# Patient Record
Sex: MALE | Race: WHITE | Employment: OTHER | URBAN - METROPOLITAN AREA
[De-identification: names, ages, dates, MRNs, and addresses within clinical notes are randomized per-mention and may not be internally consistent; named-entity substitution may affect disease eponyms.]

---

## 2017-06-28 ENCOUNTER — RADIANT APPOINTMENT (OUTPATIENT)
Dept: GENERAL RADIOLOGY | Facility: CLINIC | Age: 72
End: 2017-06-28
Attending: INTERNAL MEDICINE
Payer: COMMERCIAL

## 2017-06-28 ENCOUNTER — OFFICE VISIT (OUTPATIENT)
Dept: INTERNAL MEDICINE | Facility: CLINIC | Age: 72
End: 2017-06-28
Payer: COMMERCIAL

## 2017-06-28 VITALS
DIASTOLIC BLOOD PRESSURE: 82 MMHG | WEIGHT: 209.1 LBS | HEIGHT: 72 IN | BODY MASS INDEX: 28.32 KG/M2 | SYSTOLIC BLOOD PRESSURE: 136 MMHG | TEMPERATURE: 98.1 F | HEART RATE: 57 BPM | OXYGEN SATURATION: 98 %

## 2017-06-28 DIAGNOSIS — M25.551 HIP PAIN, RIGHT: ICD-10-CM

## 2017-06-28 DIAGNOSIS — M25.551 HIP PAIN, RIGHT: Primary | ICD-10-CM

## 2017-06-28 DIAGNOSIS — Z71.89 ADVANCED DIRECTIVES, COUNSELING/DISCUSSION: Chronic | ICD-10-CM

## 2017-06-28 PROCEDURE — 73502 X-RAY EXAM HIP UNI 2-3 VIEWS: CPT

## 2017-06-28 PROCEDURE — 99214 OFFICE O/P EST MOD 30 MIN: CPT | Performed by: INTERNAL MEDICINE

## 2017-06-28 NOTE — NURSING NOTE
"Chief Complaint   Patient presents with     Musculoskeletal Problem       Initial /82  Pulse 57  Temp 98.1  F (36.7  C) (Oral)  Ht 5' 11.5\" (1.816 m)  Wt 209 lb 1.6 oz (94.8 kg)  SpO2 98%  BMI 28.76 kg/m2 Estimated body mass index is 28.76 kg/(m^2) as calculated from the following:    Height as of this encounter: 5' 11.5\" (1.816 m).    Weight as of this encounter: 209 lb 1.6 oz (94.8 kg).  Medication Reconciliation: complete    "

## 2017-06-28 NOTE — MR AVS SNAPSHOT
After Visit Summary   6/28/2017    Guillermo Hurst    MRN: [id number]           Patient Information     Date Of Birth          1945        Visit Information        Provider Department      6/28/2017 9:00 AM Juliocesar Tuttle MD [facility]        Today's Diagnoses     Hip pain, right    -  1    Advanced directives, counseling/discussion           Follow-ups after your visit        Additional Services     SPORTS MEDICINE REFERRAL       Your provider has referred you to:  [facility]: [facility]  [phone]   [email or web address]    Please be aware that coverage of these services is subject to the terms and limitations of your health insurance plan.  Call member services at your health plan with any benefit or coverage questions.      Please bring the following to your appointment:    >>   Any x-rays, CTs or MRIs which have been performed.  Contact the facility where they were done to arrange for  prior to your scheduled appointment.    >>   List of current medications   >>   This referral request   >>   Any documents/labs given to you for this referral                  Future tests that were ordered for you today     Open Future Orders        Priority Expected Expires Ordered    XR Pelvis and Hip Right 2 Views Routine 6/28/2017 6/28/2018 6/28/2017            Who to contact     If you have questions or need follow up information about today's clinic visit or your schedule please contact [facility] directly at [phone].  Normal or non-critical lab and imaging results will be communicated to you by MyChart, letter or phone within 4 business days after the clinic has received the results. If you do not hear from us within 7 days, please contact the clinic through MyChart or phone. If you have a critical or abnormal lab  "result, we will notify you by phone as soon as possible.  Submit refill requests through [employer] or call your pharmacy and they will forward the refill request to us. Please allow 3 business days for your refill to be completed.          Additional Information About Your Visit        [employer] [facility] Information     [employer] gives you secure access to your electronic health record. If you see a primary care provider, you can also send messages to your care team and make appointments. If you have questions, please call your primary care clinic.  If you do not have a primary care provider, please call [phone] and they will assist you.        Care EveryWhere ID     This is your Care EveryWhere ID. This could be used by other organizations to access your [place] medical records  [id number]        Your Vitals Were     Pulse Temperature Height Pulse Oximetry BMI (Body Mass Index)       57 98.1  F (36.7  C) (Oral) 5' 11.5\" (1.816 m) 98% 28.76 kg/m2        Blood Pressure from Last 3 Encounters:   06/28/17 136/82   11/15/16 120/78   06/07/16 118/64    Weight from Last 3 Encounters:   06/28/17 209 lb 1.6 oz (94.8 kg)   11/15/16 209 lb 11.2 oz (95.1 kg)   06/07/16 209 lb 3.2 oz (94.9 kg)              We Performed the Following     SPORTS MEDICINE REFERRAL          Today's Medication Changes          These changes are accurate as of: 6/28/17  9:31 AM.  If you have any questions, ask your nurse or doctor.               These medicines have changed or have updated prescriptions.        Dose/Directions    aspirin 81 MG tablet   This may have changed:  See the new instructions.        1 tab po QD (Once per day)   Quantity:      Refills:                   Primary Care Provider Office Phone # Fax #    Juliocesar Tuttle [phone]       [facility] [address] [place] [phone]        Equal Access to Services     [facility] ROBB AH: Usman Coronado, robbi rosas, kena suazo " lissa vinsonchin marjoriezainab saezaan ah. [facility] [phone].    ATENCIÓN: Si habla guillermina, tiene a tobar disposición servicios gratuitos de asistencia lingüística. Nick al [phone].    We comply with applicable federal civil rights laws and [place] laws. We do not discriminate on the basis of race, color, national origin, age, disability sex, sexual orientation or gender identity.            Thank you!     Thank you for choosing [facility]  for your care. Our goal is always to provide you with excellent care. Hearing back from our patients is one way we can continue to improve our services. Please take a few minutes to complete the written survey that you may receive in the mail after your visit with us. Thank you!             Your Updated Medication List - Protect others around you: Learn how to safely use, store and throw away your medicines at www.disposemymeds.org.          This list is accurate as of: 6/28/17  9:31 AM.  Always use your most recent med list.                   Brand Name Dispense Instructions for use Diagnosis    aspirin 81 MG tablet          1 tab po QD (Once per day)        calcium carbonate 1250 MG tablet    OS-SUSIE 500 mg [place]. [place]     Take 1 tablet by mouth daily        CENTRUM SILVER ADULT 50+ Tabs      Take 1 tablet by mouth daily        lisinopril 20 MG tablet    PRINIVIL/ZESTRIL    90 tablet    Take 1 tablet (20 mg) by mouth daily    Essential hypertension, benign       magnesium 250 MG tablet      Take 1 tablet by mouth daily        simvastatin 20 MG tablet    ZOCOR    90 tablet    Take 1 tablet (20 mg) by mouth At Bedtime    Hyperlipidemia LDL goal <100

## 2017-06-28 NOTE — PROGRESS NOTES
"  SUBJECTIVE:                                                    Guillermo Hurst is a 71 year old male who presents to clinic today for the following health issues:    Joint Pain    Onset: 1 week     Description:   Location: right hip  Character: Dull ache    Intensity: mild    Progression of Symptoms: worse    Accompanying Signs & Symptoms:  Other symptoms: none    History:   Previous similar pain: YES- has had this pain for a long time but usually goes away it just hasn't gone away this time      Precipitating factors:   Trauma or overuse: maybe from limping from back surgery many years ago    Alleviating factors:  Improved by: nothing    Therapies Tried and outcome: aleve not helping as much as it used to        Problem list and histories reviewed & adjusted, as indicated.  Additional history: as documented    Labs reviewed in EPIC    Reviewed and updated as needed this visit by clinical staff  Allergies       Reviewed and updated as needed this visit by Provider       ROS:  Constitutional, HEENT, cardiovascular, pulmonary, gi and gu systems are negative, except as otherwise noted.    OBJECTIVE:                                                    /82  Pulse 57  Temp 98.1  F (36.7  C) (Oral)  Ht 5' 11.5\" (1.816 m)  Wt 209 lb 1.6 oz (94.8 kg)  SpO2 98%  BMI 28.76 kg/m2  Body mass index is 28.76 kg/(m^2).  GENERAL APPEARANCE: alert and no distress  RESP: lungs clear to auscultation - no rales, rhonchi or wheezes  CV: regular rates and rhythm, normal S1 S2, no S3 or S4 and no murmur, click or rub  MS: some limitation in abduction and int rotation of each hip, slightly more noticeable in R hip.  Small area of mild tenderness lateral R femur    Diagnostic test results:  Hip xray: moderate OA bilat      ASSESSMENT/PLAN:                                                    1. Hip pain, right  Diff dx incld troch bursitis vs OA hip.  Get xray- try low dose naproxen bid x 1 wk. See sports med  - XR Pelvis and Hip " Right 2 Views; Future  - SPORTS MEDICINE REFERRAL    2. Advanced directives, counseling/discussion        Follow up with Provider - prn      Juliocesar Tuttle MD  [facility]

## 2017-08-06 ENCOUNTER — APPOINTMENT (OUTPATIENT)
Dept: CT IMAGING | Facility: CLINIC | Age: 72
End: 2017-08-06
Attending: EMERGENCY MEDICINE
Payer: MEDICARE

## 2017-08-06 ENCOUNTER — HOSPITAL ENCOUNTER (EMERGENCY)
Facility: CLINIC | Age: 72
Discharge: HOME | End: 2017-08-07
Attending: EMERGENCY MEDICINE | Admitting: EMERGENCY MEDICINE
Payer: MEDICARE

## 2017-08-06 DIAGNOSIS — I10 ESSENTIAL HYPERTENSION: ICD-10-CM

## 2017-08-06 DIAGNOSIS — M54.6 ACUTE LEFT-SIDED THORACIC BACK PAIN: ICD-10-CM

## 2017-08-06 DIAGNOSIS — R07.89 ATYPICAL CHEST PAIN: ICD-10-CM

## 2017-08-06 DIAGNOSIS — R91.8 PULMONARY NODULES: ICD-10-CM

## 2017-08-06 LAB
ANION GAP SERPL CALCULATED.3IONS-SCNC: 6 MMOL/L (ref 3–14)
BASOPHILS # BLD AUTO: 0 10E9/L (ref 0–0.2)
BASOPHILS NFR BLD AUTO: 0.4 %
BUN SERPL-MCNC: 29 MG/DL (ref 7–30)
CALCIUM SERPL-MCNC: 8.8 MG/DL (ref 8.5–10.1)
CHLORIDE SERPL-SCNC: 107 MMOL/L (ref 94–109)
CO2 SERPL-SCNC: 26 MMOL/L (ref 20–32)
CREAT SERPL-MCNC: 1.1 MG/DL (ref 0.66–1.25)
DIFFERENTIAL METHOD BLD: NORMAL
EOSINOPHIL # BLD AUTO: 0.1 10E9/L (ref 0–0.7)
EOSINOPHIL NFR BLD AUTO: 1.5 %
ERYTHROCYTE [DISTWIDTH] IN BLOOD BY AUTOMATED COUNT: 13 % (ref 10–15)
GFR SERPL CREATININE-BSD FRML MDRD: 66 ML/MIN/1.7M2
GLUCOSE SERPL-MCNC: 103 MG/DL (ref 70–99)
HCT VFR BLD AUTO: 40.7 % (ref 40–53)
HGB BLD-MCNC: 13.7 G/DL (ref 13.3–17.7)
IMM GRANULOCYTES # BLD: 0 10E9/L (ref 0–0.4)
IMM GRANULOCYTES NFR BLD: 0.3 %
INR PPP: 0.95 (ref 0.86–1.14)
LYMPHOCYTES # BLD AUTO: 1.7 10E9/L (ref 0.8–5.3)
LYMPHOCYTES NFR BLD AUTO: 18.8 %
MCH RBC QN AUTO: 30.5 PG (ref 26.5–33)
MCHC RBC AUTO-ENTMCNC: 33.7 G/DL (ref 31.5–36.5)
MCV RBC AUTO: 91 FL (ref 78–100)
MONOCYTES # BLD AUTO: 0.8 10E9/L (ref 0–1.3)
MONOCYTES NFR BLD AUTO: 9.2 %
NEUTROPHILS # BLD AUTO: 6.4 10E9/L (ref 1.6–8.3)
NEUTROPHILS NFR BLD AUTO: 69.8 %
NRBC # BLD AUTO: 0 10*3/UL
NRBC BLD AUTO-RTO: 0 /100
PLATELET # BLD AUTO: 238 10E9/L (ref 150–450)
POTASSIUM SERPL-SCNC: 4 MMOL/L (ref 3.4–5.3)
RBC # BLD AUTO: 4.49 10E12/L (ref 4.4–5.9)
SODIUM SERPL-SCNC: 139 MMOL/L (ref 133–144)
TROPONIN I BLD-MCNC: 0.01 UG/L (ref 0–0.1)
WBC # BLD AUTO: 9.1 10E9/L (ref 4–11)

## 2017-08-06 PROCEDURE — 96361 HYDRATE IV INFUSION ADD-ON: CPT

## 2017-08-06 PROCEDURE — 85025 COMPLETE CBC W/AUTO DIFF WBC: CPT | Performed by: EMERGENCY MEDICINE

## 2017-08-06 PROCEDURE — 99285 EMERGENCY DEPT VISIT HI MDM: CPT | Mod: 25

## 2017-08-06 PROCEDURE — 96375 TX/PRO/DX INJ NEW DRUG ADDON: CPT

## 2017-08-06 PROCEDURE — 96374 THER/PROPH/DIAG INJ IV PUSH: CPT | Mod: 59

## 2017-08-06 PROCEDURE — 85610 PROTHROMBIN TIME: CPT | Performed by: EMERGENCY MEDICINE

## 2017-08-06 PROCEDURE — 71260 CT THORAX DX C+: CPT

## 2017-08-06 PROCEDURE — 80048 BASIC METABOLIC PNL TOTAL CA: CPT | Performed by: EMERGENCY MEDICINE

## 2017-08-06 PROCEDURE — 93005 ELECTROCARDIOGRAM TRACING: CPT

## 2017-08-06 PROCEDURE — 84484 ASSAY OF TROPONIN QUANT: CPT

## 2017-08-06 RX ORDER — LABETALOL HYDROCHLORIDE 5 MG/ML
20 INJECTION, SOLUTION INTRAVENOUS ONCE
Status: DISCONTINUED | OUTPATIENT
Start: 2017-08-06 | End: 2017-08-07 | Stop reason: HOSPADM

## 2017-08-06 RX ORDER — SODIUM CHLORIDE 9 MG/ML
1000 INJECTION, SOLUTION INTRAVENOUS CONTINUOUS
Status: DISCONTINUED | OUTPATIENT
Start: 2017-08-06 | End: 2017-08-07 | Stop reason: HOSPADM

## 2017-08-06 NOTE — ED AVS SNAPSHOT
[facility] Emergency Department    [address]    [facility] [address]    Phone:  [phone]    Fax:  [phone]                                       Guillermo Hurst   MRN: [id number]    Department:  [facility] Emergency Department   Date of Visit:  8/6/2017           Patient Information     Date Of Birth          1945        Your diagnoses for this visit were:     Atypical chest pain     Acute left-sided thoracic back pain     Pulmonary nodules     Essential hypertension        You were seen by Juan Hernandez MD.      Follow-up Information     Follow up with Juliocesar Tuttle MD. Schedule an appointment as soon as possible for a visit in 3 days.    Specialty:  Internal Medicine    Contact information:    [facility]  [address] [facility] [address] [phone]          Discharge Instructions       Discharge Instructions  Chest Pain    You have been seen today for chest pain or discomfort.  At this time, your doctor has found no signs that your chest pain is due to a serious or life-threatening condition, (or you have declined more testing and/or admission to the hospital). However, sometimes there is a serious problem that does not show up right away. Your evaluation today may not be complete and you may need further testing and evaluation.     You need to follow-up with your regular doctor within 3 days.    Return to the Emergency Department if:    Your chest pain changes, gets worse, starts to happen more often, or comes with less activity.    You are short of breath.    You get very weak or tired.    You pass out or faint.    You have any new symptoms, like fever, cough, numb legs, or you cough up blood.    You have anything else that worries you.    Until you follow-up with your regular doctor please do the following:    Take one aspirin daily unless you have an allergy or are told not to by your doctor.    If a stress test  appointment has been made, go to the appointment.    If you have questions, contact your regular doctor.    If your doctor today has told you to follow-up with your regular doctor, it is very important that you make an appointment with your clinic and go to the appointment.  If you do not follow-up with your primary doctor, it may result in missing an important development which could result in permanent injury or disability and/or lasting pain.  If there is any problem keeping your appointment, call your doctor or return to the Emergency Department.    If you were given a prescription for medicine here today, be sure to read all of the information (including the package insert) that comes with your prescription.  This will include important information about the medicine, its side effects, and any warnings that you need to know about.  The pharmacist who fills the prescription can provide more information and answer questions you may have about the medicine.  If you have questions or concerns that the pharmacist cannot address, please call or return to the Emergency Department.     Opioid Medication Information    Pain medications are among the most commonly prescribed medicines, so we are including this information for all our patients. If you did not receive pain medication or get a prescription for pain medicine, you can ignore it.     You may have been given a prescription for an opioid (narcotic) pain medicine and/or have received a pain medicine while here in the Emergency Department. These medicines can make you drowsy or impaired. You must not drive, operate dangerous equipment, or engage in any other dangerous activities while taking these medications. If you drive while taking these medications, you could be arrested for DUI, or driving under the influence. Do not drink any alcohol while you are taking these medications.     Opioid pain medications can cause addiction. If you have a history of chemical  dependency of any type, you are at a higher risk of becoming addicted to pain medications.  Only take these prescribed medications to treat your pain when all other options have been tried. Take it for as short a time and as few doses as possible. Store your pain pills in a secure place, as they are frequently stolen and provide a dangerous opportunity for children or visitors in your house to start abusing these powerful medications. We will not replace any lost or stolen medicine.  As soon as your pain is better, you should flush all your remaining medication.     Many prescription pain medications contain Tylenol  (acetaminophen), including Vicodin , Tylenol #3 , Norco , Lortab , and Percocet .  You should not take any extra pills of Tylenol  if you are using these prescription medications or you can get very sick.  Do not ever take more than 3000 mg of acetaminophen in any 24 hour period.    All opioids tend to cause constipation. Drink plenty of water and eat foods that have a lot of fiber, such as fruits, vegetables, prune juice, apple juice and high fiber cereal.  Take a laxative if you don t move your bowels at least every other day. Miralax , Milk of Magnesia, Colace , or Senna  can be used to keep you regular.      Remember that you can always come back to the Emergency Department if you are not able to see your regular doctor in the amount of time listed above, if you get any new symptoms, or if there is anything that worries you.      Discharge Instructions  Incidental Findings    An incidental finding is something unexpected that was found while you were being treated today.  While this finding is not an emergency, you will want to follow up with your primary doctor to determine if anything should be done about it.    These findings can come from:    Checking your vital signs (example: high blood pressure).    Taking your history (example: unexplained weight loss).    The physical exam (example: a heart  murmur).    Laboratory study (example: anemia).    X-rays/ultrasound/CT or other imaging (example: an unexplained mass).    Return to the Emergency Department if:    Your condition worsens.    You develop unexpected pain.    You now develop new symptoms or have new concerns.    Follow up with your doctor:    Follow up within the next week to discuss the results of all of your tests and about the main reason for your visit today.    Inform your doctor of what testing you had done today. You may want to obtain copies of your results from the medical records department.    Modern medical technology has become very sensitive.  Unexpected or incidental findings are very common and do not always indicate a problem.  However, sometimes problems are found very early before symptoms have even started. While these findings are not an emergency, this may allow your primary care doctor to start the earliest treatment possible. Sometimes these incidental findings are not discovered until later when the final report is reported or when your primary care doctor compares our finding to your previous studies. Therefore, it is important for you to always review all results and studies with your primary care doctor or clinic after an Emergency Department visit.  If you were given a prescription for medicine here today, be sure to read all of the information (including the package insert) that comes with your prescription.  This will include important information about the medicine, its side effects, and any warnings that you need to know about.  The pharmacist who fills the prescription can provide more information and answer questions you may have about the medicine.  If you have questions or concerns that the pharmacist cannot address, please call or return to the Emergency Department.   Remember that you can always come back to the Emergency Department if you are not able to see your regular doctor in the amount of time listed  above, if you get any new symptoms, or if there is anything that worries you.    Follow up with your doctor to get follow up CT to reevaluate pulmonary nodule for change in size.    24 Hour Appointment Hotline       To make an appointment at any [facility] clinic, call [phone] ([phone]). If you don't have a family doctor or clinic, we will help you find one. [facility] clinics are conveniently located to serve the needs of you and your family.             Review of your medicines      Our records show that you are taking the medicines listed below. If these are incorrect, please call your family doctor or clinic.        Dose / Directions Last dose taken    aspirin 81 MG tablet   Quantity:           1 tab po QD (Once per day)   Refills:           calcium carbonate 1250 MG tablet   Commonly known as:  OS-SUSIE 500 mg [place]. Ca   Dose:  1 tablet        Take 1 tablet by mouth daily   Refills:  0        CENTRUM SILVER ADULT 50+ Tabs   Dose:  1 tablet        Take 1 tablet by mouth daily   Refills:  0        lisinopril 20 MG tablet   Commonly known as:  PRINIVIL/ZESTRIL   Dose:  20 mg   Quantity:  90 tablet        Take 1 tablet (20 mg) by mouth daily   Refills:  3        magnesium 250 MG tablet   Dose:  1 tablet        Take 1 tablet by mouth daily   Refills:  0        simvastatin 20 MG tablet   Commonly known as:  ZOCOR   Dose:  20 mg   Quantity:  90 tablet        Take 1 tablet (20 mg) by mouth At Bedtime   Refills:  3                Procedures and tests performed during your visit     Basic metabolic panel    CBC with platelets differential    Cardiac Continuous Monitoring    Chest CT w IV contrast only, TRAUMA / DISSECTION    EKG 12 lead    EKG 12-lead, tracing only    INR    ISTAT troponin nursing POCT    Peripheral IV: Standard    Pulse oximetry nursing    Troponin POCT    Vital signs      Orders Needing Specimen Collection     None      Pending Results     Date and Time Order Name Status Description    8/6/2017  2334 EKG 12-lead, tracing only Preliminary     8/6/2017 2310 EKG 12 lead Preliminary             Pending Culture Results     No orders found for last 3 day(s).            Pending Results Instructions     If you had any lab results that were not finalized at the time of your Discharge, you can call the ED Lab Result RN at [phone]. You will be contacted by this team for any positive Lab results or changes in treatment. The nurses are available 7 days a week from 10A to 6:30P.  You can leave a message 24 hours per day and they will return your call.        Test Results From Your Hospital Stay        8/6/2017 11:44 PM      Component Results     Component Value Ref Range & Units Status    WBC 9.1 4.0 - 11.0 10e9/L Final    RBC Count 4.49 4.4 - 5.9 10e12/L Final    Hemoglobin 13.7 13.3 - 17.7 g/dL Final    Hematocrit 40.7 40.0 - 53.0 % Final    MCV 91 78 - 100 fl Final    MCH 30.5 26.5 - 33.0 pg Final    MCHC 33.7 31.5 - 36.5 g/dL Final    RDW 13.0 10.0 - 15.0 % Final    Platelet Count 238 150 - 450 10e9/L Final    Diff Method Automated Method  Final    % Neutrophils 69.8 % Final    % Lymphocytes 18.8 % Final    % Monocytes 9.2 % Final    % Eosinophils 1.5 % Final    % Basophils 0.4 % Final    % Immature Granulocytes 0.3 % Final    Nucleated RBCs 0 0 /100 Final    Absolute Neutrophil 6.4 1.6 - 8.3 10e9/L Final    Absolute Lymphocytes 1.7 0.8 - 5.3 10e9/L Final    Absolute Monocytes 0.8 0.0 - 1.3 10e9/L Final    Absolute Eosinophils 0.1 0.0 - 0.7 10e9/L Final    Absolute Basophils 0.0 0.0 - 0.2 10e9/L Final    Abs Immature Granulocytes 0.0 0 - 0.4 10e9/L Final    Absolute Nucleated RBC 0.0  Final         8/6/2017 11:57 PM      Component Results     Component Value Ref Range & Units Status    Sodium 139 133 - 144 mmol/L Final    Potassium 4.0 3.4 - 5.3 mmol/L Final    Chloride 107 94 - 109 mmol/L Final    Carbon Dioxide 26 20 - 32 mmol/L Final    Anion Gap 6 3 - 14 mmol/L Final    Glucose 103 (H) 70 - 99 mg/dL Final     Urea Nitrogen 29 7 - 30 mg/dL Final    Creatinine 1.10 0.66 - 1.25 mg/dL Final    GFR Estimate 66 >60 mL/min/1.7m2 Final    Non  GFR Calc    GFR Estimate If Black 79 >60 mL/min/1.7m2 Final    African American GFR Calc    Calcium 8.8 8.5 - 10.1 mg/dL Final         8/6/2017 11:55 PM      Component Results     Component Value Ref Range & Units Status    INR 0.95 0.86 - 1.14 Final         8/7/2017  1:39 AM      Narrative     CT CHEST WITH CONTRAST  8/7/2017 1:11 AM    HISTORY:  Left shoulder, now chest pain, elevated blood pressure,  evaluate for dissection.    TECHNIQUE: Volumetric CT of the chest with IV contrast.   80mL  Isovue-370 Radiation dose for this scan was reduced using automated  exposure control, adjustment of the mA and/or kV according to patient  size, or iterative reconstruction technique.    COMPARISON:  None.    FINDINGS: Mild atheromatous changes of the thoracic aorta without  aneurysm or dissection. Coronary artery calcifications. Normal heart  size. No visualized pulmonary embolism. 1 cm indeterminate nodule in  the left lower lung (series 5, image 67). Mild emphysema. Minimal  dependent atelectasis. No consolidative infiltrates. Mild atelectasis  left base. Mild degenerative changes thoracic spine. Trace pleural  fluid at the left base. No pneumothorax.        Impression     IMPRESSION:  1. No thoracic aortic dissection or other acute findings in the chest.  2. Mild emphysema. Coronary artery calcifications.  3. 1 cm nodule in the left lower lung is indeterminate. Further  follow-up/evaluation recommended. See below.    Recommendations for an incidental lung nodule > 8mm:    Low risk patients: Consider follow-up CT in 3 months, PET/CT, and/or  tissue sampling.    High risk patients: Same as for low risk patients.    *Low Risk: Minimal or absent history of smoking or other known risk  factors.  *Nonsolid (ground-glass) or partly solid nodules may require longer  follow-up to exclude  indolent adenocarcinoma.  *Recommendations based on Guidelines for the Management of Incidental  Pulmonary Nodules Detected at CT: From the Fleischner Society 2017,  Radiology 2017.    JUAN PALMA MD         8/6/2017 11:45 PM      Component Results     Component Value Ref Range & Units Status    Troponin I 0.01 0.00 - 0.10 ug/L Final                Clinical Quality Measure: Blood Pressure Screening     Your blood pressure was checked while you were in the emergency department today. The last reading we obtained was  BP: (!) 152/92 . Please read the guidelines below about what these numbers mean and what you should do about them.  If your systolic blood pressure (the top number) is less than 120 and your diastolic blood pressure (the bottom number) is less than 80, then your blood pressure is normal. There is nothing more that you need to do about it.  If your systolic blood pressure (the top number) is 120-139 or your diastolic blood pressure (the bottom number) is 80-89, your blood pressure may be higher than it should be. You should have your blood pressure rechecked within a year by a primary care provider.  If your systolic blood pressure (the top number) is 140 or greater or your diastolic blood pressure (the bottom number) is 90 or greater, you may have high blood pressure. High blood pressure is treatable, but if left untreated over time it can put you at risk for heart attack, stroke, or kidney failure. You should have your blood pressure rechecked by a primary care provider within the next 4 weeks.  If your provider in the emergency department today gave you specific instructions to follow-up with your doctor or provider even sooner than that, you should follow that instruction and not wait for up to 4 weeks for your follow-up visit.        Thank you for choosing [facility]       Thank you for choosing [facility] for your care. Our goal is always to provide you with excellent care. Hearing back from our  patients is one way we can continue to improve our services. Please take a few minutes to complete the written survey that you may receive in the mail after you visit with us. Thank you!        [employer] Information     [employer] gives you secure access to your electronic health record. If you see a primary care provider, you can also send messages to your care team and make appointments. If you have questions, please call your primary care clinic.  If you do not have a primary care provider, please call [phone] and they will assist you.        Care EveryWhere ID     This is your Care EveryWhere ID. This could be used by other organizations to access your [facility] medical records  [id number]        Equal Access to Services     KYM ROBB : Usman Coronado, robbi rosas, kena vinson, lissa ang. So [facility] [phone].    ATENCIÓN: Si habla español, tiene a tobar disposición servicios gratuitos de asistencia lingüística. Llame al [phone].    We comply with applicable federal civil rights laws and [place] laws. We do not discriminate on the basis of race, color, national origin, age, disability sex, sexual orientation or gender identity.            After Visit Summary       This is your record. Keep this with you and show to your community pharmacist(s) and doctor(s) at your next visit.

## 2017-08-06 NOTE — ED AVS SNAPSHOT
[facility] Emergency Department    [address]    [facility] [address]    Phone:  [phone]    Fax:  [phone]                                       Guillermo Hurst   MRN: [id number]    Department:  [facility] Emergency Department   Date of Visit:  8/6/2017           After Visit Summary Signature Page     I have received my discharge instructions, and my questions have been answered. I have discussed any challenges I see with this plan with the nurse or doctor.    ..........................................................................................................................................  Patient/Patient Representative Signature      ..........................................................................................................................................  Patient Representative Print Name and Relationship to Patient    ..................................................               ................................................  Date                                            Time    ..........................................................................................................................................  Reviewed by Signature/Title    ...................................................              ..............................................  Date                                                            Time

## 2017-08-07 VITALS
HEIGHT: 71 IN | RESPIRATION RATE: 19 BRPM | SYSTOLIC BLOOD PRESSURE: 152 MMHG | DIASTOLIC BLOOD PRESSURE: 92 MMHG | HEART RATE: 71 BPM | WEIGHT: 206 LBS | OXYGEN SATURATION: 96 % | TEMPERATURE: 98 F | BODY MASS INDEX: 28.84 KG/M2

## 2017-08-07 LAB
INTERPRETATION ECG - MUSE: NORMAL
INTERPRETATION ECG - MUSE: NORMAL

## 2017-08-07 PROCEDURE — 96375 TX/PRO/DX INJ NEW DRUG ADDON: CPT | Mod: 59

## 2017-08-07 PROCEDURE — 25000128 H RX IP 250 OP 636: Performed by: EMERGENCY MEDICINE

## 2017-08-07 PROCEDURE — 96361 HYDRATE IV INFUSION ADD-ON: CPT

## 2017-08-07 PROCEDURE — 71260 CT THORAX DX C+: CPT

## 2017-08-07 PROCEDURE — 96374 THER/PROPH/DIAG INJ IV PUSH: CPT | Mod: 59

## 2017-08-07 RX ORDER — IOPAMIDOL 755 MG/ML
500 INJECTION, SOLUTION INTRAVASCULAR ONCE
Status: COMPLETED | OUTPATIENT
Start: 2017-08-07 | End: 2017-08-07

## 2017-08-07 RX ORDER — DIPHENHYDRAMINE HYDROCHLORIDE 50 MG/ML
25 INJECTION INTRAMUSCULAR; INTRAVENOUS ONCE
Status: COMPLETED | OUTPATIENT
Start: 2017-08-07 | End: 2017-08-07

## 2017-08-07 RX ORDER — DEXAMETHASONE SODIUM PHOSPHATE 10 MG/ML
10 INJECTION, SOLUTION INTRAMUSCULAR; INTRAVENOUS ONCE
Status: COMPLETED | OUTPATIENT
Start: 2017-08-07 | End: 2017-08-07

## 2017-08-07 RX ADMIN — DEXAMETHASONE SODIUM PHOSPHATE 10 MG: 10 INJECTION, SOLUTION INTRAMUSCULAR; INTRAVENOUS at 00:31

## 2017-08-07 RX ADMIN — IOPAMIDOL 80 ML: 755 INJECTION, SOLUTION INTRAVENOUS at 00:10

## 2017-08-07 RX ADMIN — SODIUM CHLORIDE 1000 ML: 9 INJECTION, SOLUTION INTRAVENOUS at 00:30

## 2017-08-07 RX ADMIN — SODIUM CHLORIDE 60 ML: 9 INJECTION, SOLUTION INTRAVENOUS at 00:59

## 2017-08-07 RX ADMIN — DIPHENHYDRAMINE HYDROCHLORIDE 25 MG: 50 INJECTION, SOLUTION INTRAMUSCULAR; INTRAVENOUS at 00:31

## 2017-08-07 ASSESSMENT — ENCOUNTER SYMPTOMS
VOMITING: 0
NAUSEA: 0
BACK PAIN: 1
FEVER: 0
COUGH: 0
SHORTNESS OF BREATH: 0
DIZZINESS: 0
DIAPHORESIS: 0

## 2017-08-07 NOTE — ED NOTES
At 530 started with upper back pain more to left now has moved to front chest  Left side  Rates pain  5/10  Pain increases with deep breath or bending over  Did just complete 5 days shooting completion  Denies nausea or sob  No pain down arm  No dizziness  Here for brandyal

## 2017-08-07 NOTE — DISCHARGE INSTRUCTIONS
Discharge Instructions  Chest Pain    You have been seen today for chest pain or discomfort.  At this time, your doctor has found no signs that your chest pain is due to a serious or life-threatening condition, (or you have declined more testing and/or admission to the hospital). However, sometimes there is a serious problem that does not show up right away. Your evaluation today may not be complete and you may need further testing and evaluation.     You need to follow-up with your regular doctor within 3 days.    Return to the Emergency Department if:    Your chest pain changes, gets worse, starts to happen more often, or comes with less activity.    You are short of breath.    You get very weak or tired.    You pass out or faint.    You have any new symptoms, like fever, cough, numb legs, or you cough up blood.    You have anything else that worries you.    Until you follow-up with your regular doctor please do the following:    Take one aspirin daily unless you have an allergy or are told not to by your doctor.    If a stress test appointment has been made, go to the appointment.    If you have questions, contact your regular doctor.    If your doctor today has told you to follow-up with your regular doctor, it is very important that you make an appointment with your clinic and go to the appointment.  If you do not follow-up with your primary doctor, it may result in missing an important development which could result in permanent injury or disability and/or lasting pain.  If there is any problem keeping your appointment, call your doctor or return to the Emergency Department.    If you were given a prescription for medicine here today, be sure to read all of the information (including the package insert) that comes with your prescription.  This will include important information about the medicine, its side effects, and any warnings that you need to know about.  The pharmacist who fills the prescription can  provide more information and answer questions you may have about the medicine.  If you have questions or concerns that the pharmacist cannot address, please call or return to the Emergency Department.     Opioid Medication Information    Pain medications are among the most commonly prescribed medicines, so we are including this information for all our patients. If you did not receive pain medication or get a prescription for pain medicine, you can ignore it.     You may have been given a prescription for an opioid (narcotic) pain medicine and/or have received a pain medicine while here in the Emergency Department. These medicines can make you drowsy or impaired. You must not drive, operate dangerous equipment, or engage in any other dangerous activities while taking these medications. If you drive while taking these medications, you could be arrested for DUI, or driving under the influence. Do not drink any alcohol while you are taking these medications.     Opioid pain medications can cause addiction. If you have a history of chemical dependency of any type, you are at a higher risk of becoming addicted to pain medications.  Only take these prescribed medications to treat your pain when all other options have been tried. Take it for as short a time and as few doses as possible. Store your pain pills in a secure place, as they are frequently stolen and provide a dangerous opportunity for children or visitors in your house to start abusing these powerful medications. We will not replace any lost or stolen medicine.  As soon as your pain is better, you should flush all your remaining medication.     Many prescription pain medications contain Tylenol  (acetaminophen), including Vicodin , Tylenol #3 , Norco , Lortab , and Percocet .  You should not take any extra pills of Tylenol  if you are using these prescription medications or you can get very sick.  Do not ever take more than 3000 mg of acetaminophen in any 24 hour  period.    All opioids tend to cause constipation. Drink plenty of water and eat foods that have a lot of fiber, such as fruits, vegetables, prune juice, apple juice and high fiber cereal.  Take a laxative if you don t move your bowels at least every other day. Miralax , Milk of Magnesia, Colace , or Senna  can be used to keep you regular.      Remember that you can always come back to the Emergency Department if you are not able to see your regular doctor in the amount of time listed above, if you get any new symptoms, or if there is anything that worries you.      Discharge Instructions  Incidental Findings    An incidental finding is something unexpected that was found while you were being treated today.  While this finding is not an emergency, you will want to follow up with your primary doctor to determine if anything should be done about it.    These findings can come from:    Checking your vital signs (example: high blood pressure).    Taking your history (example: unexplained weight loss).    The physical exam (example: a heart murmur).    Laboratory study (example: anemia).    X-rays/ultrasound/CT or other imaging (example: an unexplained mass).    Return to the Emergency Department if:    Your condition worsens.    You develop unexpected pain.    You now develop new symptoms or have new concerns.    Follow up with your doctor:    Follow up within the next week to discuss the results of all of your tests and about the main reason for your visit today.    Inform your doctor of what testing you had done today. You may want to obtain copies of your results from the medical records department.    Modern medical technology has become very sensitive.  Unexpected or incidental findings are very common and do not always indicate a problem.  However, sometimes problems are found very early before symptoms have even started. While these findings are not an emergency, this may allow your primary care doctor to start  the earliest treatment possible. Sometimes these incidental findings are not discovered until later when the final report is reported or when your primary care doctor compares our finding to your previous studies. Therefore, it is important for you to always review all results and studies with your primary care doctor or clinic after an Emergency Department visit.  If you were given a prescription for medicine here today, be sure to read all of the information (including the package insert) that comes with your prescription.  This will include important information about the medicine, its side effects, and any warnings that you need to know about.  The pharmacist who fills the prescription can provide more information and answer questions you may have about the medicine.  If you have questions or concerns that the pharmacist cannot address, please call or return to the Emergency Department.   Remember that you can always come back to the Emergency Department if you are not able to see your regular doctor in the amount of time listed above, if you get any new symptoms, or if there is anything that worries you.    Follow up with your doctor to get follow up CT to reevaluate pulmonary nodule for change in size.

## 2017-08-07 NOTE — ED PROVIDER NOTES
History     Chief Complaint:  Chest Pain and Back Pain    HPI   Guillermo Hurst is a 71 year old male with a history of hypertension and hyperlipidemia who presents for evaluation of back and chest pain. The patient reports gradual onset of a sharp, pleuritic left upper back pain around 1730 today. This progressively worsened until 1930 when he took Aleve. His pain then moved more into her left chest. The pain has been constant with no radiation. The patient just completed a 5 day [employer] shooting competition. He is a right handed shooter. The patient denies any shortness of breath, cough, fevers, nausea, vomiting, dizziness, diaphoresis or any other symptoms. The patient is currently declining pain medications     Cardiac/PE/DVT Risk Factors:  The patient has a history of hypertension and hyperlipidemia and is a former smoker.  He reports a family history of CAD.  He denies any personal or familial history of PE, DVT or clotting disorder.  He reports no recent travel, surgery or other immobilizations.  He is not on hormone therapy and has no known malignancy.     Allergies:  Iodine  Penicillins      Medications:    Simvastatin  Lisinopril  Mg  Aspirin  Calcium carbonate     Past Medical History:    Essential hypertension   Hyperlipidemia   Vertigo  Pericarditis    Past Surgical History:    Lumbar laminectomy   Ear surgery, bilateral     Family History:    Brother - CAD, hypertension   Sister - CAD  Father - CAD    Social History:  Marital Status:     Smoking status: Former smoker  Alcohol use: Yes   Presents to the ED with his wife     Review of Systems   Constitutional: Negative for diaphoresis and fever.   Respiratory: Negative for cough and shortness of breath.    Cardiovascular: Positive for chest pain.   Gastrointestinal: Negative for nausea and vomiting.   Musculoskeletal: Positive for back pain.   Neurological: Negative for dizziness.   All other systems reviewed and are negative.      Physical Exam  "    Patient Vitals for the past 24 hrs:   BP Temp Temp src Pulse Heart Rate Resp SpO2 Height Weight   08/07/17 0037 - - - - 66 - 100 % - -   08/07/17 0036 - - - - 68 - 100 % - -   08/07/17 0035 166/88 - - - 66 - 100 % - -   08/06/17 2335 (!) 144/94 - - - 68 - 96 % - -   08/06/17 2308 (!) 182/93 98  F (36.7  C) Oral 71 - 19 100 % 1.803 m (5' 11\") 93.4 kg (206 lb)      Physical Exam  General: The patient is alert, in no respiratory distress.    HENT: Mucous membranes moist.    Cardiovascular: Regular rate and rhythm. Good pulses in all four extremities. Normal capillary refill and skin turgor.     Respiratory: Lungs are clear. No nasal flaring. No retractions. No wheezing, no crackles.    Gastrointestinal: Abdomen soft. No guarding, no rebound. No palpable hernias. Nontender.     Musculoskeletal: No gross deformity. Discrete left chest wall tenderness. Minimal lower extremity swelling.     Skin: No rashes or petechiae.     Neurologic: The patient is alert and oriented x3. GCS 15. No testable cranial nerve deficit. Follows commands with clear and appropriate speech. Gives appropriate answers. Good strength in all extremities. No gross neurologic deficit. Gross sensation intact. Pupils are round and reactive. No meningismus.     Lymphatic: No cervical adenopathy. No lower extremity swelling.    Psychiatric: The patient is non-tearful.     Emergency Department Course   ECG:  @ 2319  Indication: Screening ECG  Vent. Rate 68 bpm. NM interval 166 ms. QRS duration 154 ms. QT/QTc 428/455 ms. P-R-T axis 62 -3 144.   Sinus rhythm with premature atrial complexes. Possible left atrial enlargement. Left bundle branch block - old. Abnormal ECG.     Read @ 5197 by Dr. Hernandez.     Imaging:  Radiographic findings were communicated with the patient who voiced understanding of the findings.    CT Chest w contrast  IMPRESSION:  1. No thoracic aortic dissection or other acute findings in the chest.  2. Mild emphysema. Coronary artery " calcifications.  3. 1 cm nodule in the left lower lung is indeterminate. Further  follow-up/evaluation recommended. See below.  Preliminary radiology read.       Laboratory:  CBC:  WBC 9.1, HGB 13.7, , otherwise WNL  BMP: Glucose 103 (H), otherwise WNL (Creatinine 1.10)  INR: 0.95  Troponin POCT: 0.01    Interventions:  (0030) Normal Saline, 1 liter, IV bolus   (0031) Benadryl 25 mg IV  (0031) Decadron 10 mg IV    Emergency Department Course:  Nursing notes and vitals reviewed.  (2325) I performed an exam of the patient as documented above.    Blood was drawn from the patient. This was sent for laboratory testing, findings above.     (0015) I spoke with radiology regarding contrast allergy.     (0030) Patient update. Discussed risks and benefits of CT with contrast given patient's allergy to contrast. The patient agrees to proceed with pre-medications.     The patient was sent for a chest CT w contrast while in the emergency department, findings above.      (0140) Patient update. Findings and plan explained to the patient. Patient discharged home with instructions regarding supportive care, medications, and reasons to return. The importance of close follow-up was reviewed.      Impression & Plan      Medical Decision Making:  The patient presents complaining of significant elevated nicolas as well as back pain radiating other to her chest. I did tell the patient that this may be due to dissection. I discussed that I could not exclude this as he does not have significant chest wall tenderness but he does have some chest wall tenderness. After discussion with the patient, radiologist amongst others, despite the report of an allergy to IV contrast we did pre-treat him. He had no reaction. The findings of the CT scan were discussed with the patient but there are no signs of dissection. He does have a pulmonary nodule which will need follow up. His cardiac enzyme is negative and I do not feel that this is likely  cardiac ischemia. The patient likely injured his chest wall from the shooting competition over the last 5 days. He said his pain is already doing better after just Ibuprofen and he was discharged home in good condition. There are no other signs to suggest intraabdominal process, pneumonia/pneumothorax.     Diagnosis:    ICD-10-CM   1. Acute chest pain R07.9   2. Essential hypertension I10   3. Tobacco abuse Z72.0       Disposition:  Patient is discharged to home.       Geoffrey Valera  8/6/2017   [facility] EMERGENCY DEPARTMENT    I, Geoffrey Valera, am serving as a scribe on 8/6/2017 at 11:25 PM to personally document services performed by Dr. Hernandez based on my observations and the provider's statements to me.       Juan Hernandez MD  08/08/17 1538

## 2017-08-10 ENCOUNTER — OFFICE VISIT (OUTPATIENT)
Dept: INTERNAL MEDICINE | Facility: CLINIC | Age: 72
End: 2017-08-10
Payer: COMMERCIAL

## 2017-08-10 VITALS
SYSTOLIC BLOOD PRESSURE: 134 MMHG | HEART RATE: 66 BPM | TEMPERATURE: 98.3 F | BODY MASS INDEX: 29.25 KG/M2 | WEIGHT: 209.7 LBS | DIASTOLIC BLOOD PRESSURE: 62 MMHG | OXYGEN SATURATION: 96 %

## 2017-08-10 DIAGNOSIS — R07.81 PLEURITIC CHEST PAIN: ICD-10-CM

## 2017-08-10 DIAGNOSIS — M70.51 PES ANSERINUS BURSITIS OF RIGHT KNEE: Primary | ICD-10-CM

## 2017-08-10 DIAGNOSIS — R91.1 PULMONARY NODULE, LEFT: ICD-10-CM

## 2017-08-10 PROCEDURE — 99214 OFFICE O/P EST MOD 30 MIN: CPT | Performed by: INTERNAL MEDICINE

## 2017-08-10 NOTE — MR AVS SNAPSHOT
After Visit Summary   8/10/2017    Guillermo Hurst    MRN: [id number]           Patient Information     Date Of Birth          1945        Visit Information        Provider Department      8/10/2017 9:40 AM Juliocesar Tuttle MD [facility]        Today's Diagnoses     Pes anserinus bursitis of right knee    -  1    Pleuritic chest pain        Pulmonary nodule, left           Follow-ups after your visit        Additional Services     SPORTS MEDICINE REFERRAL       Your provider has referred you to:  [facility] [employer] [facility]  [phone]   [email or web address]    Please be aware that coverage of these services is subject to the terms and limitations of your health insurance plan.  Call member services at your health plan with any benefit or coverage questions.      Please bring the following to your appointment:    >>   Any x-rays, CTs or MRIs which have been performed.  Contact the facility where they were done to arrange for  prior to your scheduled appointment.    >>   List of current medications   >>   This referral request   >>   Any documents/labs given to you for this referral                  Who to contact     If you have questions or need follow up information about today's clinic visit or your schedule please contact [facility] directly at [phone].  Normal or non-critical lab and imaging results will be communicated to you by MyChart, letter or phone within 4 business days after the clinic has received the results. If you do not hear from us within 7 days, please contact the clinic through MyChart or phone. If you have a critical or abnormal lab result, we will notify you by phone as soon as possible.  Submit refill requests through [employer] or call your pharmacy and they will forward the refill request to  us. Please allow 3 business days for your refill to be completed.          Additional Information About Your Visit        [employer] Information     [employer] gives you secure access to your electronic health record. If you see a primary care provider, you can also send messages to your care team and make appointments. If you have questions, please call your primary care clinic.  If you do not have a primary care provider, please call [phone] and they will assist you.        Care EveryWhere ID     This is your Care EveryWhere ID. This could be used by other organizations to access your [place] medical records  [id number]        Your Vitals Were     Pulse Temperature Pulse Oximetry BMI (Body Mass Index)          66 98.3  F (36.8  C) (Oral) 96% 29.25 kg/m2         Blood Pressure from Last 3 Encounters:   08/10/17 134/62   08/07/17 (!) 152/92   06/28/17 136/82    Weight from Last 3 Encounters:   08/10/17 209 lb 11.2 oz (95.1 kg)   08/06/17 206 lb (93.4 kg)   06/28/17 209 lb 1.6 oz (94.8 kg)              We Performed the Following     SPORTS MEDICINE REFERRAL          Today's Medication Changes          These changes are accurate as of: 8/10/17 10:43 AM.  If you have any questions, ask your nurse or doctor.               These medicines have changed or have updated prescriptions.        Dose/Directions    aspirin 81 MG tablet   This may have changed:  See the new instructions.        1 tab po QD (Once per day)   Quantity:      Refills:                   Primary Care Provider Office Phone # Fax #    Juliocesar Tuttle [phone]       [address] [place] [phone]        Equal Access to Services     [facility] : Hadhannah Coronado, wamagen rosas, qaybfran rojoallissa mays. So [facility] [phone].    ATENCIÓN: Si habla español, tiene a tobar disposición servicios gratuitos de asistencia lingüística. Llame al [phone].    We comply  with applicable federal civil rights laws and [place] laws. We do not discriminate on the basis of race, color, national origin, age, disability sex, sexual orientation or gender identity.            Thank you!     Thank you for choosing [facility]  for your care. Our goal is always to provide you with excellent care. Hearing back from our patients is one way we can continue to improve our services. Please take a few minutes to complete the written survey that you may receive in the mail after your visit with us. Thank you!             Your Updated Medication List - Protect others around you: Learn how to safely use, store and throw away your medicines at www.disposemymeds.org.          This list is accurate as of: 8/10/17 10:43 AM.  Always use your most recent med list.                   Brand Name Dispense Instructions for use Diagnosis    aspirin 81 MG tablet          1 tab po QD (Once per day)        calcium carbonate 1250 MG tablet    OS-SUSIE 500 mg [place]. [place]     Take 1 tablet by mouth daily        CENTRUM SILVER ADULT 50+ Tabs      Take 1 tablet by mouth daily        lisinopril 20 MG tablet    PRINIVIL/ZESTRIL    90 tablet    Take 1 tablet (20 mg) by mouth daily    Essential hypertension, benign       magnesium 250 MG tablet      Take 1 tablet by mouth daily        simvastatin 20 MG tablet    ZOCOR    90 tablet    Take 1 tablet (20 mg) by mouth At Bedtime    Hyperlipidemia LDL goal <100

## 2017-08-10 NOTE — PROGRESS NOTES
SUBJECTIVE:                                                    Guillermo Hurst is a 71 year old male who presents to clinic today for the following health issues:      ED/UC Followup:    Facility:  [facility] ED  Date of visit: 8-6-17  Reason for visit: Chest and back pain  Current Status: better     The patient reports gradual onset of a sharp, pleuritic left upper back pain on 8/6. This progressively worsened until 1930 when he took Aleve. His pain then moved more into her left chest. The pain has been constant with no radiation. The patient just completed a 5 day [employer] shooting competition. He is a right handed shooter. The patient denies any shortness of breath, cough, fevers, nausea, vomiting, dizziness, diaphoresis or any other symptoms.  His w/u was negative and incld a CT of his chest.  He has noted complete resolution of his pain since.     Patient also complains about R knee pain he said he did something llast winter and now the last few weeks it has been very painful. He reports pain medially. Hurst w/stairs, when sleeping eran if he is in the lateral position.     Problem list and histories reviewed & adjusted, as indicated.  Additional history: as documented    Labs reviewed in EPIC    Reviewed and updated as needed this visit by clinical staff       Reviewed and updated as needed this visit by Provider         ROS:  Constitutional, HEENT, cardiovascular, pulmonary, gi and gu systems are negative, except as otherwise noted.      OBJECTIVE:                                                    /62  Pulse 66  Temp 98.3  F (36.8  C) (Oral)  Wt 209 lb 11.2 oz (95.1 kg)  SpO2 96%  BMI 29.25 kg/m2  Body mass index is 29.25 kg/(m^2).  GENERAL APPEARANCE: healthy, alert and no distress  HENT: ear canals and TM's normal and nose and mouth without ulcers or lesions  NECK: no adenopathy, no asymmetry, masses, or scars and thyroid normal to palpation  RESP: lungs clear to auscultation - no rales, rhonchi  or wheezes  CV: regular rates and rhythm, normal S1 S2, no S3 or S4 and no murmur, click or rub  MS: pain medially R knee posterior/inferior to joint line. No effusion or other tenderness in knee.     Diagnostic test results:  none        ASSESSMENT/PLAN:                                                    1. Pes anserinus bursitis of right knee  - SPORTS MEDICINE REFERRAL    2. Pleuritic chest pain  Resolved. No further f/u     3. Pulmonary nodule, left  Repeat CT in 3 mo    Juliocesar Tuttle MD  [facility]

## 2017-08-10 NOTE — NURSING NOTE
"Chief Complaint   Patient presents with     Hospital F/U       Initial /62  Pulse 66  Temp 98.3  F (36.8  C) (Oral)  Wt 209 lb 11.2 oz (95.1 kg)  SpO2 96%  BMI 29.25 kg/m2 Estimated body mass index is 29.25 kg/(m^2) as calculated from the following:    Height as of 8/6/17: 5' 11\" (1.803 m).    Weight as of this encounter: 209 lb 11.2 oz (95.1 kg).  Medication Reconciliation: complete    "

## 2017-08-11 ENCOUNTER — OFFICE VISIT (OUTPATIENT)
Dept: ORTHOPEDICS | Facility: CLINIC | Age: 72
End: 2017-08-11
Payer: COMMERCIAL

## 2017-08-11 ENCOUNTER — HOSPITAL ENCOUNTER (OUTPATIENT)
Dept: MRI IMAGING | Facility: CLINIC | Age: 72
Discharge: HOME | End: 2017-08-11
Attending: ORTHOPAEDIC SURGERY | Admitting: ORTHOPAEDIC SURGERY
Payer: MEDICARE

## 2017-08-11 ENCOUNTER — RADIANT APPOINTMENT (OUTPATIENT)
Dept: GENERAL RADIOLOGY | Facility: CLINIC | Age: 72
End: 2017-08-11
Attending: ORTHOPAEDIC SURGERY
Payer: COMMERCIAL

## 2017-08-11 VITALS
WEIGHT: 209 LBS | SYSTOLIC BLOOD PRESSURE: 138 MMHG | DIASTOLIC BLOOD PRESSURE: 84 MMHG | BODY MASS INDEX: 29.26 KG/M2 | HEIGHT: 71 IN

## 2017-08-11 DIAGNOSIS — M25.561 CHRONIC PAIN OF RIGHT KNEE: Primary | ICD-10-CM

## 2017-08-11 DIAGNOSIS — G89.29 CHRONIC PAIN OF RIGHT KNEE: Primary | ICD-10-CM

## 2017-08-11 DIAGNOSIS — G89.29 CHRONIC PAIN OF RIGHT KNEE: ICD-10-CM

## 2017-08-11 DIAGNOSIS — M25.561 CHRONIC PAIN OF RIGHT KNEE: ICD-10-CM

## 2017-08-11 PROCEDURE — 99203 OFFICE O/P NEW LOW 30 MIN: CPT | Performed by: ORTHOPAEDIC SURGERY

## 2017-08-11 PROCEDURE — 73562 X-RAY EXAM OF KNEE 3: CPT | Mod: RT

## 2017-08-11 PROCEDURE — 73721 MRI JNT OF LWR EXTRE W/O DYE: CPT | Mod: RT

## 2017-08-11 NOTE — PROGRESS NOTES
HISTORY OF PRESENT ILLNESS:    Guillermo Hurst is a 71 year old male who is seen in consultation at the request of Dr. Tuttle for Right knee pain    Present symptoms: Pt states pain started last winter, felt something while stretching.  Pt states pain is over the medial aspect of the knee, throbbing pain especially at night.  Pt states he can not sleep on his side, states he could up until a few weeks ago, states he has always slept with a pillow between his knees.  Pt states knee is stiff, can not bend as easily as it did.  Pt states pain with sitting in a car / driving.  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  Treatments tried to this point: aleve  Orthopedic PMH: 3 back surgeries     Past Medical History:   Diagnosis Date     Essential hypertension, benign     Hypertension, Benign     LBBB (left bundle branch block)      Nephritis and nephropathy, not specified as acute or chronic, with unspecified pathological lesion in kidney      Vertigo     has had multiple ear surgeries- told due to this by ENT     VIRAL PERICARDITIS 1975    resolved       Past Surgical History:   Procedure Laterality Date     C BORGES W/O FACETEC FORAMOT/DSKC 1/2 VRT SEG, LUMBAR  1975    laminectomy     C BORGES W/O FACETEC FORAMOT/DSKC 1/2 VRT SEG, LUMBAR  1985    laminectomy     C NONSPECIFIC PROCEDURE  1983    R ear surgery due to recurrent infections     C NONSPECIFIC PROCEDURE  2002    LAMINECTOMY AND SPINAL FUSION , FLEXIBLE STRUT , 3 LEVELS     COLONOSCOPY       ENT SURGERY      Both ears       Family History   Problem Relation Age of Onset     Cardiovascular Brother      b:1928  hx bypass, HTN     C.A.D. Brother      Coronary Artery Disease Brother      Hypertension Brother      Respiratory Sister      b:1932  asthma and allergies     DIABETES Sister      Cardiovascular Sister      pacemaker and bypass surgery     Coronary Artery Disease Sister      Hypertension  Sister      Asthma Sister      Circulatory Sister      b:1936 Raynaud's phenomenon     CANCER Brother      d:age 55  hx esophogeal then liver cancer.     Alcohol/Drug Brother      alcoholic - sober at time of death     Obesity Brother      C.A.D. Brother      Hypertension Brother      Chemical Addiction Brother      CANCER Brother      brain cancer     C.A.D. Brother      Family History Negative Sister      b:     Circulatory Mother      b:190  hx of L leg amputation due to circulatory problems in , HTN     Hypertension Mother      Cardiovascular Father      d:age 53 after second MI     Alcohol/Drug Father      alcoholic, sober 5 yrs prior to death     Coronary Artery Disease Father           CANCER Paternal Grandmother      lung cancer       Social History     Social History     Marital status:      Spouse name: N/A     Number of children: 3     Years of education: N/A     Occupational History     [employer]      [employer]     Social History Main Topics     Smoking status: Former Smoker     Packs/day: 1.00     Years: 20.00     Types: Cigarettes     Quit date: 1985     Smokeless tobacco: Never Used     Alcohol use 0.0 oz/week     0 Standard drinks or equivalent per week      Comment: Avg (1.5 per day, wine, beer or cocktail)     Drug use: No     Sexual activity: Yes     Partners: Female     Birth control/ protection: Surgical     Other Topics Concern     Parent/Sibling W/ Cabg, Mi Or Angioplasty Before 65f 55m? Yes     Social History Narrative       Current Outpatient Prescriptions   Medication Sig Dispense Refill     simvastatin (ZOCOR) 20 MG tablet Take 1 tablet (20 mg) by mouth At Bedtime 90 tablet 3     Magnesium 250 MG tablet Take 1 tablet by mouth daily        lisinopril (PRINIVIL,ZESTRIL) 20 MG tablet Take 1 tablet (20 mg) by mouth daily (Patient not taking: Reported on 2017) 90 tablet 3     Multiple Vitamins-Minerals (CENTRUM SILVER ADULT 50+) TABS Take 1 tablet  "by mouth daily       calcium carbonate 500 MG tablet Take 1 tablet by mouth daily        ASPIRIN 81 MG OR TABS 1 tab po QD (Once per day) (Patient not taking: Reported on 8/11/2017)         Allergies   Allergen Reactions     Iodine      \"puffed up\"     Penicillins      rash       REVIEW OF SYSTEMS:  CONSTITUTIONAL:  NEGATIVE for fever, chills, change in weight  INTEGUMENTARY/SKIN:  NEGATIVE for worrisome rashes, moles or lesions  EYES:  NEGATIVE for vision changes or irritation  ENT/MOUTH:  NEGATIVE for ear, mouth and throat problems  RESP:  NEGATIVE for significant cough or SOB  BREAST:  NEGATIVE for masses, tenderness or discharge  CV:  NEGATIVE for chest pain, palpitations or peripheral edema  GI:  NEGATIVE for nausea, abdominal pain, heartburn, or change in bowel habits  :  Negative   MUSCULOSKELETAL:  See HPI above  NEURO:  NEGATIVE for weakness, dizziness or paresthesias  ENDOCRINE:  NEGATIVE for temperature intolerance, skin/hair changes  HEME/ALLERGY/IMMUNE:  NEGATIVE for bleeding problems  PSYCHIATRIC:  NEGATIVE for changes in mood or affect      PHYSICAL EXAM:  /84 (BP Location: Right arm, Patient Position: Sitting, Cuff Size: Adult Regular)  Ht 5' 11\" (1.803 m)  Wt 209 lb (94.8 kg)  BMI 29.15 kg/m2  Body mass index is 29.15 kg/(m^2).   GENERAL APPEARANCE: healthy, alert and no distress   SKIN: no suspicious lesions or rashes  NEURO: Normal strength and tone, mentation intact and speech normal  VASCULAR: Good pulses, and capillary refill   LYMPH: no lymphadenopathy   PSYCH:  mentation appears normal and affect normal/bright    MSK:  A&OX3, NAD  Neck supple, no lymphadenopathy  The patient ambulates without an antalgic gait.  The patient is able to get on and off the exam table without difficulty.      Examination of the spine reveals a normal lordosis to the cervical and lumbar spine, and a normal kyphosis to the thoracic spine.  There is no clinical evidence of scoliosis.    The pelvis is " clinically level.  Trendelenberg is negative.  The patient is non-tender to palpation over the greater trochanteric bursal region or piriformis fossa.  With the hip flexed to 90 degrees, internal and external rotation is 30/60 respectively,  with no pain .      KNEE: Examination of the right knee reveals the lower extremity to have a Normal anatomic alignment.  There is no erythema, ecchymosis nor edema.  There is no effusion present clinically.  There is no significant warmth.  Range of motion is 0 to 120 degrees, without crepitus.  There is moderate tenderness to palpation at the medial joint line.  Ramin's is positive without crepitus. The knee is ligamentously stable. Patello-femoral grind test is negative.      The calves and thighs are symmetric, without atrophy and non-tender to palpation. Virginie's sign is negative, bilaterally.   CMS is intact to the toes.        ASSESSMENT / PLAN: Probable medial meniscus tear. I ordered an MRI to confirm the suspicion.    Imaging Interpretation:         William Crawford MD  Department of Orthopedic Surgery        Disclaimer: This note consists of symbols derived from keyboarding, dictation and/or voice recognition software. As a result, there may be errors in the script that have gone undetected. Please consider this when interpreting information found in this chart.

## 2017-08-11 NOTE — PATIENT INSTRUCTIONS
Please call [facility] [phone] ([address], [address], [place], [place]) to schedule your appointment if you have not heard from them in two business days    Please follow up in clinic 2-3 business days following the MRI. Clinic phone number is [phone] to schedule your appointment.

## 2017-08-11 NOTE — NURSING NOTE
"Chief Complaint   Patient presents with     Knee Pain     Right knee       Initial /84 (BP Location: Right arm, Patient Position: Sitting, Cuff Size: Adult Regular)  Ht 5' 11\" (1.803 m)  Wt 209 lb (94.8 kg)  BMI 29.15 kg/m2 Estimated body mass index is 29.15 kg/(m^2) as calculated from the following:    Height as of this encounter: 5' 11\" (1.803 m).    Weight as of this encounter: 209 lb (94.8 kg).  Medication Reconciliation: complete    "

## 2017-08-11 NOTE — MR AVS SNAPSHOT
After Visit Summary   8/11/2017    Guillermo Hurst    MRN: [id number]           Patient Information     Date Of Birth          1945        Visit Information        Provider Department      8/11/2017 10:40 AM Russlel Crawford MD [facility]        Today's Diagnoses     Chronic pain of right knee    -  1      Care Instructions    Please call [facility] [phone] ([address], [address], [place], [place]) to schedule your appointment if you have not heard from them in two business days    Please follow up in clinic 2-3 business days following the MRI. Clinic phone number is [phone] to schedule your appointment.                Follow-ups after your visit        Future tests that were ordered for you today     Open Future Orders        Priority Expected Expires Ordered    MR Knee Right w/o Contrast Routine  8/11/2018 8/11/2017            Who to contact     If you have questions or need follow up information about today's clinic visit or your schedule please contact [facility] directly at [phone].  Normal or non-critical lab and imaging results will be communicated to you by MyChart, letter or phone within 4 business days after the clinic has received the results. If you do not hear from us within 7 days, please contact the clinic through MyChart or phone. If you have a critical or abnormal lab result, we will notify you by phone as soon as possible.  Submit refill requests through [employer] or call your pharmacy and they will forward the refill request to us. Please allow 3 business days for your refill to be completed.          Additional Information About Your Visit        MyChart Information     [employer] gives you secure access to your electronic health record. If you see a primary care provider, you can also send messages to your care team and make appointments. If you have questions, please call your primary  "care clinic.  If you do not have a primary care provider, please call [phone] and they will assist you.        Care EveryWhere ID     This is your Care EveryWhere ID. This could be used by other organizations to access your [place] medical records  [id number]        Your Vitals Were     Height BMI (Body Mass Index)                5' 11\" (1.803 m) 29.15 kg/m2           Blood Pressure from Last 3 Encounters:   08/11/17 138/84   08/10/17 134/62   08/07/17 (!) 152/92    Weight from Last 3 Encounters:   08/11/17 209 lb (94.8 kg)   08/10/17 209 lb 11.2 oz (95.1 kg)   08/06/17 206 lb (93.4 kg)               Primary Care Provider Office Phone # Fax #    Juliocesar Tuttle [phone]       [address] [place] [address]        Equal Access to Services     ELLEN ROBB : Hadii aad ku hadasho Soomaali, waaxda luqadaha, qaybta kaalmada adeegyada, waxay idiin haymarcon светлана cruz . So [facility] [phone].    ATENCIÓN: Si habla español, tiene a tobar disposición servicios gratuitos de asistencia lingüística. LlSumma Health [phone].    We comply with applicable federal civil rights laws and [place] laws. We do not discriminate on the basis of race, color, national origin, age, disability sex, sexual orientation or gender identity.            Thank you!     Thank you for choosing [facility]  for your care. Our goal is always to provide you with excellent care. Hearing back from our patients is one way we can continue to improve our services. Please take a few minutes to complete the written survey that you may receive in the mail after your visit with us. Thank you!             Your Updated Medication List - Protect others around you: Learn how to safely use, store and throw away your medicines at www.disposemymeds.org.          This list is accurate as of: 8/11/17 11:11 AM.  Always use your most recent med list.                   Brand Name Dispense Instructions for " use Diagnosis    aspirin 81 MG tablet          1 tab po QD (Once per day)        calcium carbonate 1250 MG tablet    OS-SUSIE 500 mg [place]. Ca     Take 1 tablet by mouth daily        CENTRUM SILVER ADULT 50+ Tabs      Take 1 tablet by mouth daily        lisinopril 20 MG tablet    PRINIVIL/ZESTRIL    90 tablet    Take 1 tablet (20 mg) by mouth daily    Essential hypertension, benign       magnesium 250 MG tablet      Take 1 tablet by mouth daily        simvastatin 20 MG tablet    ZOCOR    90 tablet    Take 1 tablet (20 mg) by mouth At Bedtime    Hyperlipidemia LDL goal <100

## 2017-08-23 ENCOUNTER — OFFICE VISIT (OUTPATIENT)
Dept: ORTHOPEDICS | Facility: CLINIC | Age: 72
End: 2017-08-23
Payer: COMMERCIAL

## 2017-08-23 ENCOUNTER — TELEPHONE (OUTPATIENT)
Dept: ORTHOPEDICS | Facility: CLINIC | Age: 72
End: 2017-08-23

## 2017-08-23 VITALS — HEIGHT: 71 IN | BODY MASS INDEX: 29.26 KG/M2 | WEIGHT: 209 LBS

## 2017-08-23 DIAGNOSIS — S83.241D ACUTE MEDIAL MENISCUS TEAR OF RIGHT KNEE, SUBSEQUENT ENCOUNTER: Primary | ICD-10-CM

## 2017-08-23 PROCEDURE — 99213 OFFICE O/P EST LOW 20 MIN: CPT | Performed by: ORTHOPAEDIC SURGERY

## 2017-08-23 NOTE — PROGRESS NOTES
"HISTORY OF PRESENT ILLNESS:    Guillermo Hurst is a 71 year old male who is seen in follow up for results of recent MRI of the right knee. .      PHYSICAL EXAM:  Ht 5' 11\" (1.803 m)  Wt 209 lb (94.8 kg)  BMI 29.15 kg/m2  Body mass index is 29.15 kg/(m^2).   GENERAL APPEARANCE: healthy, alert and no distress   SKIN: no suspicious lesions or rashes  NEURO: Normal strength and tone, mentation intact and speech normal  VASCULAR:  good pulses, and cappillary refill   LYMPH: no lymphadenopathy   PSYCH:  mentation appears normal and affect normal/bright    MSK:  Examination of his right knee reveals a moderate effusion and medial joint line tenderness with Ramin's being positive. It is essentially unchanged from his previous examination.    IMAGING INTERPRETATION:      IMPRESSION:MRI  1. Prominent medial meniscal tearing.  2. Mild ACL cyst formation.  3. Grade I chondromalacia of the trochlea.  4. Mild effusion.  Results for orders placed or performed in visit on 08/11/17   XR Knee Right 3 Views    Narrative    RIGHT KNEE THREE VIEWS  8/11/2017 10:53 AM     HISTORY: Pain in right knee. Other chronic pain.      Impression    IMPRESSION: Patellar spurring at the quadriceps tendon attachment.  Lateral radiograph is indeterminate for a small joint effusion.  Medial/lateral/patellofemoral compartment joint space widths appear  within normal limits. Femoral and popliteal artery calcifications are  noted.    ODYLE ESPINOZA MD          ASSESSMENT / PLAN: Medial meniscus tear, right knee. I offered him an arthroscopic partial medial meniscectomy, which he has chosen to do. He will schedule this at his convenience.      Return to clinic after the surgery.    William Crawford MD  Dept. Orthopedic Surgery  [facility]       Disclaimer: This note consists of symbols derived from keyboarding, dictation and/or voice recognition software. As a result, there may be errors in the script that have gone undetected. Please " consider this when interpreting information found in this chart.

## 2017-08-23 NOTE — TELEPHONE ENCOUNTER
Scheduled surgery for Right knee arthroscopy on 8/28/2017 with Dr. Crawford @ [facility] @ 11:10.  Surgery education packet provided to patient.

## 2017-08-23 NOTE — MR AVS SNAPSHOT
"              After Visit Summary   8/23/2017    Guillermo Hurst    MRN: [id number]           Patient Information     Date Of Birth          1945        Visit Information        Provider Department      8/23/2017 2:20 PM Russell Crawford MD [facility]        Today's Diagnoses     Acute medial meniscus tear of right knee, subsequent encounter    -  1       Follow-ups after your visit        Who to contact     If you have questions or need follow up information about today's clinic visit or your schedule please contact [facility] directly at [phone].  Normal or non-critical lab and imaging results will be communicated to you by [employer], letter or phone within 4 business days after the clinic has received the results. If you do not hear from us within 7 days, please contact the clinic through [place] or phone. If you have a critical or abnormal lab result, we will notify you by phone as soon as possible.  Submit refill requests through [employer] or call your pharmacy and they will forward the refill request to us. Please allow 3 business days for your refill to be completed.          Additional Information About Your Visit        MyChart Information     [employer] gives you secure access to your electronic health record. If you see a primary care provider, you can also send messages to your care team and make appointments. If you have questions, please call your primary care clinic.  If you do not have a primary care provider, please call [phone] and they will assist you.        Care EveryWhere ID     This is your Care EveryWhere ID. This could be used by other organizations to access your [place] medical records  [id number]        Your Vitals Were     Height BMI (Body Mass Index)                5' 11\" (1.803 m) 29.15 kg/m2           Blood Pressure from Last 3 Encounters:   08/28/17 138/82   08/24/17 124/68   08/11/17 138/84    Weight from Last 3 " Encounters:   08/28/17 204 lb (92.5 kg)   08/24/17 206 lb 2 oz (93.5 kg)   08/23/17 209 lb (94.8 kg)              Today, you had the following     No orders found for display       Primary Care Provider Office Phone # Fax #    Juliocesar Tuttle [phone]       [address] [facility] [address]        Equal Access to Services     KYM ROBB : Hadii aad ku hadasho Soomaali, waaxda luqadaha, qaybta kaalmada adeegyada, waxay idiin hayaan adeeg marjorieadriannemira cruz . So [facility] [phone].    ATENCIÓN: Si panchito sarmiento, tiene a tobar disposición servicios gratuitos de asistencia lingüística. [facility] [phone].    We comply with applicable federal civil rights laws and [place] laws. We do not discriminate on the basis of race, color, national origin, age, disability sex, sexual orientation or gender identity.            Thank you!     Thank you for choosing [facility]  for your care. Our goal is always to provide you with excellent care. Hearing back from our patients is one way we can continue to improve our services. Please take a few minutes to complete the written survey that you may receive in the mail after your visit with us. Thank you!             Your Updated Medication List - Protect others around you: Learn how to safely use, store and throw away your medicines at www.disposemymeds.org.          This list is accurate as of: 8/23/17 11:59 PM.  Always use your most recent med list.                   Brand Name Dispense Instructions for use Diagnosis    aspirin 81 MG tablet          1 tab po QD (Once per day)        calcium carbonate 1250 MG tablet    OS-SUSIE 500 mg [place]. Ca     Take 1 tablet by mouth daily        CENTRUM SILVER ADULT 50+ Tabs      Take 1 tablet by mouth daily        lisinopril 20 MG tablet    PRINIVIL/ZESTRIL    90 tablet    Take 1 tablet (20 mg) by mouth daily    Essential hypertension, benign       magnesium 250 MG tablet      Take 1 tablet by  mouth daily        oxyCODONE-acetaminophen 5-325 MG per tablet    PERCOCET    30 tablet    Take 1-2 tablets by mouth every 4 hours as needed for pain maximum 8 tablet(s) per day    Old peripheral tear of lateral meniscus of right knee       simvastatin 20 MG tablet    ZOCOR    90 tablet    Take 1 tablet (20 mg) by mouth At Bedtime    Hyperlipidemia LDL goal <100

## 2017-08-23 NOTE — NURSING NOTE
"Chief Complaint   Patient presents with     Knee right       Initial Ht 5' 11\" (1.803 m)  Wt 209 lb (94.8 kg)  BMI 29.15 kg/m2 Estimated body mass index is 29.15 kg/(m^2) as calculated from the following:    Height as of this encounter: 5' 11\" (1.803 m).    Weight as of this encounter: 209 lb (94.8 kg).  Medication Reconciliation: complete     Christi Fletcher, ATC    "

## 2017-08-24 ENCOUNTER — OFFICE VISIT (OUTPATIENT)
Dept: FAMILY MEDICINE | Facility: CLINIC | Age: 72
End: 2017-08-24
Payer: COMMERCIAL

## 2017-08-24 VITALS
HEART RATE: 85 BPM | TEMPERATURE: 98.1 F | HEIGHT: 71 IN | WEIGHT: 206.13 LBS | DIASTOLIC BLOOD PRESSURE: 68 MMHG | BODY MASS INDEX: 28.86 KG/M2 | SYSTOLIC BLOOD PRESSURE: 124 MMHG

## 2017-08-24 DIAGNOSIS — I44.7 LEFT BUNDLE BRANCH BLOCK: ICD-10-CM

## 2017-08-24 DIAGNOSIS — Z01.818 PREOP GENERAL PHYSICAL EXAM: Primary | ICD-10-CM

## 2017-08-24 DIAGNOSIS — I10 ESSENTIAL HYPERTENSION, BENIGN: ICD-10-CM

## 2017-08-24 DIAGNOSIS — S83.241A TEAR OF MEDIAL MENISCUS OF RIGHT KNEE, CURRENT, UNSPECIFIED TEAR TYPE, INITIAL ENCOUNTER: ICD-10-CM

## 2017-08-24 PROCEDURE — 99214 OFFICE O/P EST MOD 30 MIN: CPT | Performed by: FAMILY MEDICINE

## 2017-08-24 NOTE — NURSING NOTE
"Chief Complaint   Patient presents with     Pre-Op Exam       Initial /68 (BP Location: Right arm, Patient Position: Chair, Cuff Size: Adult Large)  Pulse 85  Temp 98.1  F (36.7  C) (Oral)  Ht 5' 10.75\" (1.797 m)  Wt 206 lb 2 oz (93.5 kg)  BMI 28.95 kg/m2 Estimated body mass index is 28.95 kg/(m^2) as calculated from the following:    Height as of this encounter: 5' 10.75\" (1.797 m).    Weight as of this encounter: 206 lb 2 oz (93.5 kg).  Medication Reconciliation: michael Chavez CMA          "

## 2017-08-24 NOTE — PROGRESS NOTES
[facility]  [id number] [place] [address]  [phone]  Dept: [phone]    PRE-OP EVALUATION:  Today's date: 2017    Guillermo Hurst (: 1945) presents for pre-operative evaluation assessment as requested by Dr. Crawford.  He requires evaluation and anesthesia risk assessment prior to undergoing surgery/procedure for treatment of right knee torn meniscus.  Proposed procedure: meniscectomy    Date of Surgery/ Procedure: 2017  Time of Surgery/ Procedure: 11:10am  Hospital/Surgical Facility: [facility]    Primary Physician: Juliocesar Tuttle  Type of Anesthesia Anticipated: General    Patient has a Health Care Directive or Living Will:  NO    Preop Questions 2017   1.  Do you have a history of heart attack, stroke, stent, bypass or surgery on an artery in the head, neck, heart or legs? No   2.  Do you ever have any pain or discomfort in your chest? No   3.  Do you have a history of  Heart Failure? No   4.   Are you troubled by shortness of breath when:  walking on a level surface, or up a slight hill, or at night? No   5.  Do you currently have a cold, bronchitis or other respiratory infection? No   6.  Do you have a cough, shortness of breath, or wheezing? No   7.  Do you sometimes get pains in the calves of your legs when you walk? No   8. Do you or anyone in your family have previous history of blood clots? No   9.  Do you or does anyone in your family have a serious bleeding problem such as prolonged bleeding following surgeries or cuts? No   10. Have you ever had problems with anemia or been told to take iron pills? No   11. Have you had any abnormal blood loss such as black, tarry or bloody stools? No   12. Have you ever had a blood transfusion? No   13. Have you or any of your relatives ever had problems with anesthesia? No   14. Do you have sleep apnea, excessive snoring or daytime drowsiness? No   15. Do you have any prosthetic heart valves? No   16. Do you  have prosthetic joints? No       HPI:                                                      Brief HPI related to upcoming procedure: torn right medial meniscus      See problem list for active medical problems.  Problems all longstanding and stable, except as noted/documented.  See ROS for pertinent symptoms related to these conditions.                                                                                                  .    MEDICAL HISTORY:                                                    Patient Active Problem List    Diagnosis Date Noted     Left bundle branch block 08/24/2017     Priority: Medium     Pulmonary nodule, left 08/10/2017     Priority: Medium     Hyperlipidemia LDL goal <100 12/02/2014     Priority: Medium     Former smoker 11/22/2011     Priority: Medium     Advanced directives, counseling/discussion 11/04/2011     Priority: Medium     Advance Directive Problem List Overview:   Name Relationship Phone    Primary Health Care Agent            Alternative Health Care Agent          Discussed advance care planning with patient; information given to patient to review. 11/4/2011         Advance Care Planning 6/28/2017: ACP Review of Chart / Resources Provided:  Reviewed chart for advance care plan.  Guillermo Hurst has been provided information and resources to begin or update their advance care plan.  Added by Delmy Foster             Essential hypertension, benign 10/21/2003     Priority: Medium      Past Medical History:   Diagnosis Date     Essential hypertension, benign     Hypertension, Benign     LBBB (left bundle branch block)      Nephritis and nephropathy, not specified as acute or chronic, with unspecified pathological lesion in kidney      Vertigo     has had multiple ear surgeries- told due to this by ENT     VIRAL PERICARDITIS 1975    resolved     Past Surgical History:   Procedure Laterality Date     C BORGES W/O FACETEC FORAMOT/DSKC 1/2 VRT SEG, LUMBAR  1975    laminectomy  "    C BORGES W/O FACETEC FORAMOT/DSKC 1/2 VRT SEG, LUMBAR  1985    laminectomy     C NONSPECIFIC PROCEDURE  1983    R ear surgery due to recurrent infections     C NONSPECIFIC PROCEDURE  2002    LAMINECTOMY AND SPINAL FUSION , FLEXIBLE STRUT , 3 LEVELS     COLONOSCOPY       ENT SURGERY      Both ears     Current Outpatient Prescriptions   Medication Sig Dispense Refill     simvastatin (ZOCOR) 20 MG tablet Take 1 tablet (20 mg) by mouth At Bedtime 90 tablet 3     lisinopril (PRINIVIL,ZESTRIL) 20 MG tablet Take 1 tablet (20 mg) by mouth daily 90 tablet 3     Multiple Vitamins-Minerals (CENTRUM SILVER ADULT 50+) TABS Take 1 tablet by mouth daily       Magnesium 250 MG tablet Take 1 tablet by mouth daily        calcium carbonate 500 MG tablet Take 1 tablet by mouth daily        ASPIRIN 81 MG OR TABS 1 tab po QD (Once per day) (Patient taking differently: every other day)       OTC products: None, except as noted above    Allergies   Allergen Reactions     Iodine      \"puffed up\"     Penicillins      rash      Latex Allergy: NO    Social History   Substance Use Topics     Smoking status: Former Smoker     Packs/day: 1.00     Years: 20.00     Types: Cigarettes     Quit date: 8/1/1985     Smokeless tobacco: Never Used     Alcohol use 0.0 oz/week     0 Standard drinks or equivalent per week      Comment: Avg (1.5 per day, wine, beer or cocktail)     History   Drug Use No       REVIEW OF SYSTEMS:                                                    Constitutional, neuro, ENT, endocrine, pulmonary, cardiac, gastrointestinal, genitourinary, musculoskeletal, integument and psychiatric systems are negative, except as otherwise noted.      EXAM:                                                    /68 (BP Location: Right arm, Patient Position: Chair, Cuff Size: Adult Large)  Pulse 85  Temp 98.1  F (36.7  C) (Oral)  Ht 5' 10.75\" (1.797 m)  Wt 206 lb 2 oz (93.5 kg)  BMI 28.95 kg/m2    GENERAL APPEARANCE: healthy, alert and no " distress     EYES: EOMI,  PERRL     HENT: ear canals and TM's normal and nose and mouth without ulcers or lesions     NECK: no adenopathy, no asymmetry, masses, or scars and thyroid normal to palpation     RESP: lungs clear to auscultation - no rales, rhonchi or wheezes     CV: regular rates and rhythm, normal S1 S2, no S3 or S4 and no murmur, click or rub     ABDOMEN:  soft, nontender, no HSM or masses and bowel sounds normal     MS: extremities normal- no gross deformities noted, no evidence of inflammation in joints, FROM in all extremities.     SKIN: no suspicious lesions or rashes     NEURO: Normal strength and tone, sensory exam grossly normal, mentation intact and speech normal     PSYCH: mentation appears normal. and affect normal/bright     LYMPHATICS: No axillary, cervical, or supraclavicular nodes    DIAGNOSTICS:                                                    EKG: Not indicated due to non-vascular surgery and last ekg on 8/6/17 (within 30 days for CAD history or last year for cardiac risk factors)    Recent Labs   Lab Test  08/06/17   2330  11/15/16   0958   03/18/13   1547   HGB  13.7   --    --   14.0   PLT  238   --    --   226   INR  0.95   --    --    --    NA  139  141   < >   --    POTASSIUM  4.0  4.3   < >   --    CR  1.10  1.02   < >   --     < > = values in this interval not displayed.      IMPRESSION:                                                      Diagnosis/reason for consult: pre operative clearance for right meniscectomy    The proposed surgical procedure is considered INTERMEDIATE risk.    REVISED CARDIAC RISK INDEX  The patient has the following serious cardiovascular risks for perioperative complications such as (MI, PE, VFib and 3  AV Block):  No serious cardiac risks  INTERPRETATION: 0 risks: Class I (very low risk - 0.4% complication rate)    The patient has the following additional risks for perioperative complications:  No identified additional risks      ICD-10-CM    1.  Preop general physical exam Z01.818    2. Tear of medial meniscus of right knee, current, unspecified tear type, initial encounter S83.241A    3. Essential hypertension, benign I10 Well controlled   4. Left bundle branch block I44.7 Previous normal stress testing, echo       RECOMMENDATIONS:                                                      --Patient is to take all scheduled medications on the day of surgery EXCEPT for modifications listed below.    Anticoagulant or Antiplatelet Medication Use  ASPIRIN: Discontinue ASA 7-10 days prior to procedure to reduce bleeding risk.  It should be resumed post-operatively.    APPROVAL GIVEN to proceed with proposed procedure, without further diagnostic evaluation       Signed Electronically by: Virginia Fofana MD

## 2017-08-24 NOTE — MR AVS SNAPSHOT
After Visit Summary   8/24/2017    Guillermo Hurst    MRN: [id number]           Patient Information     Date Of Birth          1945        Visit Information        Provider Department      8/24/2017 1:00 PM Virginia Fofana MD [facility]        Today's Diagnoses     Preop general physical exam    -  1      Care Instructions      Before Your Surgery      Call your surgeon if there is any change in your health. This includes signs of a cold or flu (such as a sore throat, runny nose, cough, rash or fever).    Do not smoke, drink alcohol or take over the counter medicine (unless your surgeon or primary care doctor tells you to) for the 24 hours before and after surgery.    If you take prescribed drugs: Follow your doctor s orders about which medicines to take and which to stop until after surgery.    Eating and drinking prior to surgery: follow the instructions from your surgeon    Take a shower or bath the night before surgery. Use the soap your surgeon gave you to gently clean your skin. If you do not have soap from your surgeon, use your regular soap. Do not shave or scrub the surgery site.  Wear clean pajamas and have clean sheets on your bed.           Follow-ups after your visit        Your next 10 appointments already scheduled     Aug 28, 2017   Procedure with Russell Crawford MD   [facility] (--)    [address] [facility] [id number] [phone]            Sep 07, 2017 11:20 AM CDT   Return Visit with Juan Calderón PA-C   [facility] ORTHOPEDIC SURGERY ([facility])    [address] [address] [address]   [phone]              Who to contact     If you have questions or need follow up information about today's clinic visit or your schedule please contact [facility] directly at [phone].  Normal or non-critical lab and imaging results will be communicated to  "you by MyChart, letter or phone within 4 business days after the clinic has received the results. If you do not hear from us within 7 days, please contact the clinic through [employer] or phone. If you have a critical or abnormal lab result, we will notify you by phone as soon as possible.  Submit refill requests through [employer] or call your pharmacy and they will forward the refill request to us. Please allow 3 business days for your refill to be completed.          Additional Information About Your Visit        [facility] Information     [employer] gives you secure access to your electronic health record. If you see a primary care provider, you can also send messages to your care team and make appointments. If you have questions, please call your primary care clinic.  If you do not have a primary care provider, please call [phone] and they will assist you.        Care EveryWhere ID     This is your Care EveryWhere ID. This could be used by other organizations to access your [facility] medical records  [id number]        Your Vitals Were     Pulse Temperature Height BMI (Body Mass Index)          85 98.1  F (36.7  C) (Oral) 5' 10.75\" (1.797 m) 28.95 kg/m2         Blood Pressure from Last 3 Encounters:   08/24/17 124/68   08/11/17 138/84   08/10/17 134/62    Weight from Last 3 Encounters:   08/24/17 206 lb 2 oz (93.5 kg)   08/23/17 209 lb (94.8 kg)   08/11/17 209 lb (94.8 kg)              Today, you had the following     No orders found for display         Today's Medication Changes          These changes are accurate as of: 8/24/17  1:38 PM.  If you have any questions, ask your nurse or doctor.               These medicines have changed or have updated prescriptions.        Dose/Directions    aspirin 81 MG tablet   This may have changed:  See the new instructions.        1 tab po QD (Once per day)   Quantity:      Refills:                   Primary Care Provider Office Phone # Fax #    Juliocesar Tuttle [phone] " [phone]       [address] [facility] [address]        Equal Access to Services     REJIELLEN CEZAR : Hadii lenny warner reginaldo Coronado, wafroyda luqadaha, qaybta kaalmada alisson, lissa dinoin hayaapaulette wrightchin guzman laUlisescristopher ang. So [facility] [phone].    ATENCIÓN: Si habla español, tiene a tobar disposición servicios gratuitos de asistencia lingüística. Llame al [phone].    We comply with applicable federal civil rights laws and [place] laws. We do not discriminate on the basis of race, color, national origin, age, disability sex, sexual orientation or gender identity.            Thank you!     Thank you for choosing [facility]  for your care. Our goal is always to provide you with excellent care. Hearing back from our patients is one way we can continue to improve our services. Please take a few minutes to complete the written survey that you may receive in the mail after your visit with us. Thank you!             Your Updated Medication List - Protect others around you: Learn how to safely use, store and throw away your medicines at www.disposemymeds.org.          This list is accurate as of: 8/24/17  1:38 PM.  Always use your most recent med list.                   Brand Name Dispense Instructions for use Diagnosis    aspirin 81 MG tablet          1 tab po QD (Once per day)        calcium carbonate 1250 MG tablet    OS-SUSIE 500 mg [place]. [place]     Take 1 tablet by mouth daily        CENTRUM SILVER ADULT 50+ Tabs      Take 1 tablet by mouth daily        lisinopril 20 MG tablet    PRINIVIL/ZESTRIL    90 tablet    Take 1 tablet (20 mg) by mouth daily    Essential hypertension, benign       magnesium 250 MG tablet      Take 1 tablet by mouth daily        simvastatin 20 MG tablet    ZOCOR    90 tablet    Take 1 tablet (20 mg) by mouth At Bedtime    Hyperlipidemia LDL goal <100

## 2017-08-25 NOTE — H&P (VIEW-ONLY)
[facility]  [address] [place] [id number]  [phone]  Dept: [phone]    PRE-OP EVALUATION:  Today's date: 2017    Guillermo Hurst (: 1945) presents for pre-operative evaluation assessment as requested by Dr. Crawford.  He requires evaluation and anesthesia risk assessment prior to undergoing surgery/procedure for treatment of right knee torn meniscus.  Proposed procedure: meniscectomy    Date of Surgery/ Procedure: 2017  Time of Surgery/ Procedure: 11:10am  Hospital/Surgical Facility: [facility]    Primary Physician: Juliocesar Tuttle  Type of Anesthesia Anticipated: General    Patient has a Health Care Directive or Living Will:  NO    Preop Questions 2017   1.  Do you have a history of heart attack, stroke, stent, bypass or surgery on an artery in the head, neck, heart or legs? No   2.  Do you ever have any pain or discomfort in your chest? No   3.  Do you have a history of  Heart Failure? No   4.   Are you troubled by shortness of breath when:  walking on a level surface, or up a slight hill, or at night? No   5.  Do you currently have a cold, bronchitis or other respiratory infection? No   6.  Do you have a cough, shortness of breath, or wheezing? No   7.  Do you sometimes get pains in the calves of your legs when you walk? No   8. Do you or anyone in your family have previous history of blood clots? No   9.  Do you or does anyone in your family have a serious bleeding problem such as prolonged bleeding following surgeries or cuts? No   10. Have you ever had problems with anemia or been told to take iron pills? No   11. Have you had any abnormal blood loss such as black, tarry or bloody stools? No   12. Have you ever had a blood transfusion? No   13. Have you or any of your relatives ever had problems with anesthesia? No   14. Do you have sleep apnea, excessive snoring or daytime drowsiness? No   15. Do you have any prosthetic heart valves? No   16. Do you  have prosthetic joints? No       HPI:                                                      Brief HPI related to upcoming procedure: torn right medial meniscus      See problem list for active medical problems.  Problems all longstanding and stable, except as noted/documented.  See ROS for pertinent symptoms related to these conditions.                                                                                                  .    MEDICAL HISTORY:                                                    Patient Active Problem List    Diagnosis Date Noted     Left bundle branch block 08/24/2017     Priority: Medium     Pulmonary nodule, left 08/10/2017     Priority: Medium     Hyperlipidemia LDL goal <100 12/02/2014     Priority: Medium     Former smoker 11/22/2011     Priority: Medium     Advanced directives, counseling/discussion 11/04/2011     Priority: Medium     Advance Directive Problem List Overview:   Name Relationship Phone    Primary Health Care Agent            Alternative Health Care Agent          Discussed advance care planning with patient; information given to patient to review. 11/4/2011         Advance Care Planning 6/28/2017: ACP Review of Chart / Resources Provided:  Reviewed chart for advance care plan.  Guillermo Hurst has been provided information and resources to begin or update their advance care plan.  Added by Delmy Foster             Essential hypertension, benign 10/21/2003     Priority: Medium      Past Medical History:   Diagnosis Date     Essential hypertension, benign     Hypertension, Benign     LBBB (left bundle branch block)      Nephritis and nephropathy, not specified as acute or chronic, with unspecified pathological lesion in kidney      Vertigo     has had multiple ear surgeries- told due to this by ENT     VIRAL PERICARDITIS 1975    resolved     Past Surgical History:   Procedure Laterality Date     C BORGES W/O FACETEC FORAMOT/DSKC 1/2 VRT SEG, LUMBAR  1975    laminectomy  "    C BORGES W/O FACETEC FORAMOT/DSKC 1/2 VRT SEG, LUMBAR  1985    laminectomy     C NONSPECIFIC PROCEDURE  1983    R ear surgery due to recurrent infections     C NONSPECIFIC PROCEDURE  2002    LAMINECTOMY AND SPINAL FUSION , FLEXIBLE STRUT , 3 LEVELS     COLONOSCOPY       ENT SURGERY      Both ears     Current Outpatient Prescriptions   Medication Sig Dispense Refill     simvastatin (ZOCOR) 20 MG tablet Take 1 tablet (20 mg) by mouth At Bedtime 90 tablet 3     lisinopril (PRINIVIL,ZESTRIL) 20 MG tablet Take 1 tablet (20 mg) by mouth daily 90 tablet 3     Multiple Vitamins-Minerals (CENTRUM SILVER ADULT 50+) TABS Take 1 tablet by mouth daily       Magnesium 250 MG tablet Take 1 tablet by mouth daily        calcium carbonate 500 MG tablet Take 1 tablet by mouth daily        ASPIRIN 81 MG OR TABS 1 tab po QD (Once per day) (Patient taking differently: every other day)       OTC products: None, except as noted above    Allergies   Allergen Reactions     Iodine      \"puffed up\"     Penicillins      rash      Latex Allergy: NO    Social History   Substance Use Topics     Smoking status: Former Smoker     Packs/day: 1.00     Years: 20.00     Types: Cigarettes     Quit date: 8/1/1985     Smokeless tobacco: Never Used     Alcohol use 0.0 oz/week     0 Standard drinks or equivalent per week      Comment: Avg (1.5 per day, wine, beer or cocktail)     History   Drug Use No       REVIEW OF SYSTEMS:                                                    Constitutional, neuro, ENT, endocrine, pulmonary, cardiac, gastrointestinal, genitourinary, musculoskeletal, integument and psychiatric systems are negative, except as otherwise noted.      EXAM:                                                    /68 (BP Location: Right arm, Patient Position: Chair, Cuff Size: Adult Large)  Pulse 85  Temp 98.1  F (36.7  C) (Oral)  Ht 5' 10.75\" (1.797 m)  Wt 206 lb 2 oz (93.5 kg)  BMI 28.95 kg/m2    GENERAL APPEARANCE: healthy, alert and no " distress     EYES: EOMI,  PERRL     HENT: ear canals and TM's normal and nose and mouth without ulcers or lesions     NECK: no adenopathy, no asymmetry, masses, or scars and thyroid normal to palpation     RESP: lungs clear to auscultation - no rales, rhonchi or wheezes     CV: regular rates and rhythm, normal S1 S2, no S3 or S4 and no murmur, click or rub     ABDOMEN:  soft, nontender, no HSM or masses and bowel sounds normal     MS: extremities normal- no gross deformities noted, no evidence of inflammation in joints, FROM in all extremities.     SKIN: no suspicious lesions or rashes     NEURO: Normal strength and tone, sensory exam grossly normal, mentation intact and speech normal     PSYCH: mentation appears normal. and affect normal/bright     LYMPHATICS: No axillary, cervical, or supraclavicular nodes    DIAGNOSTICS:                                                    EKG: Not indicated due to non-vascular surgery and last ekg on 8/6/17 (within 30 days for CAD history or last year for cardiac risk factors)    Recent Labs   Lab Test  08/06/17   2330  11/15/16   0958   03/18/13   1547   HGB  13.7   --    --   14.0   PLT  238   --    --   226   INR  0.95   --    --    --    NA  139  141   < >   --    POTASSIUM  4.0  4.3   < >   --    CR  1.10  1.02   < >   --     < > = values in this interval not displayed.      IMPRESSION:                                                      Diagnosis/reason for consult: pre operative clearance for right meniscectomy    The proposed surgical procedure is considered INTERMEDIATE risk.    REVISED CARDIAC RISK INDEX  The patient has the following serious cardiovascular risks for perioperative complications such as (MI, PE, VFib and 3  AV Block):  No serious cardiac risks  INTERPRETATION: 0 risks: Class I (very low risk - 0.4% complication rate)    The patient has the following additional risks for perioperative complications:  No identified additional risks      ICD-10-CM    1.  Preop general physical exam Z01.818    2. Tear of medial meniscus of right knee, current, unspecified tear type, initial encounter S83.241A    3. Essential hypertension, benign I10 Well controlled   4. Left bundle branch block I44.7 Previous normal stress testing, echo       RECOMMENDATIONS:                                                      --Patient is to take all scheduled medications on the day of surgery EXCEPT for modifications listed below.    Anticoagulant or Antiplatelet Medication Use  ASPIRIN: Discontinue ASA 7-10 days prior to procedure to reduce bleeding risk.  It should be resumed post-operatively.    APPROVAL GIVEN to proceed with proposed procedure, without further diagnostic evaluation       Signed Electronically by: Virginia Fofana MD

## 2017-08-28 ENCOUNTER — ANESTHESIA (OUTPATIENT)
Dept: SURGERY | Facility: CLINIC | Age: 72
End: 2017-08-28
Payer: MEDICARE

## 2017-08-28 ENCOUNTER — HOSPITAL ENCOUNTER (OUTPATIENT)
Facility: CLINIC | Age: 72
Discharge: HOME | End: 2017-08-28
Attending: ORTHOPAEDIC SURGERY | Admitting: ORTHOPAEDIC SURGERY
Payer: MEDICARE

## 2017-08-28 ENCOUNTER — ANESTHESIA EVENT (OUTPATIENT)
Dept: SURGERY | Facility: CLINIC | Age: 72
End: 2017-08-28
Payer: MEDICARE

## 2017-08-28 VITALS
OXYGEN SATURATION: 96 % | TEMPERATURE: 97.5 F | WEIGHT: 204 LBS | DIASTOLIC BLOOD PRESSURE: 82 MMHG | HEART RATE: 72 BPM | SYSTOLIC BLOOD PRESSURE: 138 MMHG | HEIGHT: 70 IN | RESPIRATION RATE: 16 BRPM | BODY MASS INDEX: 29.2 KG/M2

## 2017-08-28 DIAGNOSIS — M23.200 OLD PERIPHERAL TEAR OF LATERAL MENISCUS OF RIGHT KNEE: Primary | ICD-10-CM

## 2017-08-28 PROCEDURE — 29881 ARTHRS KNE SRG MNISECTMY M/L: CPT | Mod: RT | Performed by: ORTHOPAEDIC SURGERY

## 2017-08-28 PROCEDURE — 25800025 ZZH RX 258: Performed by: ORTHOPAEDIC SURGERY

## 2017-08-28 PROCEDURE — 25000566 ZZH SEVOFLURANE, EA 15 MIN: Performed by: ORTHOPAEDIC SURGERY

## 2017-08-28 PROCEDURE — 40000306 ZZH STATISTIC PRE PROC ASSESS II: Performed by: ORTHOPAEDIC SURGERY

## 2017-08-28 PROCEDURE — 71000012 ZZH RECOVERY PHASE 1 LEVEL 1 FIRST HR: Performed by: ORTHOPAEDIC SURGERY

## 2017-08-28 PROCEDURE — 36000058 ZZH SURGERY LEVEL 3 EA 15 ADDTL MIN: Performed by: ORTHOPAEDIC SURGERY

## 2017-08-28 PROCEDURE — 25000125 ZZHC RX 250: Performed by: NURSE ANESTHETIST, CERTIFIED REGISTERED

## 2017-08-28 PROCEDURE — 71000027 ZZH RECOVERY PHASE 2 EACH 15 MINS: Performed by: ORTHOPAEDIC SURGERY

## 2017-08-28 PROCEDURE — S0077 INJECTION, CLINDAMYCIN PHOSP: HCPCS | Performed by: ORTHOPAEDIC SURGERY

## 2017-08-28 PROCEDURE — 25000128 H RX IP 250 OP 636: Performed by: NURSE ANESTHETIST, CERTIFIED REGISTERED

## 2017-08-28 PROCEDURE — 25000128 H RX IP 250 OP 636: Performed by: ORTHOPAEDIC SURGERY

## 2017-08-28 PROCEDURE — 27210794 ZZH OR GENERAL SUPPLY STERILE: Performed by: ORTHOPAEDIC SURGERY

## 2017-08-28 PROCEDURE — 37000009 ZZH ANESTHESIA TECHNICAL FEE, EACH ADDTL 15 MIN: Performed by: ORTHOPAEDIC SURGERY

## 2017-08-28 PROCEDURE — 36000056 ZZH SURGERY LEVEL 3 1ST 30 MIN: Performed by: ORTHOPAEDIC SURGERY

## 2017-08-28 PROCEDURE — 25000128 H RX IP 250 OP 636: Performed by: ANESTHESIOLOGY

## 2017-08-28 PROCEDURE — 37000008 ZZH ANESTHESIA TECHNICAL FEE, 1ST 30 MIN: Performed by: ORTHOPAEDIC SURGERY

## 2017-08-28 PROCEDURE — 25000125 ZZHC RX 250: Performed by: ORTHOPAEDIC SURGERY

## 2017-08-28 RX ORDER — CLINDAMYCIN PHOSPHATE 900 MG/50ML
900 INJECTION, SOLUTION INTRAVENOUS SEE ADMIN INSTRUCTIONS
Status: DISCONTINUED | OUTPATIENT
Start: 2017-08-28 | End: 2017-08-28 | Stop reason: HOSPADM

## 2017-08-28 RX ORDER — SODIUM CHLORIDE, SODIUM LACTATE, POTASSIUM CHLORIDE, CALCIUM CHLORIDE 600; 310; 30; 20 MG/100ML; MG/100ML; MG/100ML; MG/100ML
INJECTION, SOLUTION INTRAVENOUS CONTINUOUS
Status: DISCONTINUED | OUTPATIENT
Start: 2017-08-28 | End: 2017-08-28 | Stop reason: HOSPADM

## 2017-08-28 RX ORDER — PROPOFOL 10 MG/ML
INJECTION, EMULSION INTRAVENOUS PRN
Status: DISCONTINUED | OUTPATIENT
Start: 2017-08-28 | End: 2017-08-28

## 2017-08-28 RX ORDER — FENTANYL CITRATE 50 UG/ML
25-50 INJECTION, SOLUTION INTRAMUSCULAR; INTRAVENOUS
Status: DISCONTINUED | OUTPATIENT
Start: 2017-08-28 | End: 2017-08-28 | Stop reason: HOSPADM

## 2017-08-28 RX ORDER — EPHEDRINE SULFATE 50 MG/ML
INJECTION, SOLUTION INTRAVENOUS PRN
Status: DISCONTINUED | OUTPATIENT
Start: 2017-08-28 | End: 2017-08-28

## 2017-08-28 RX ORDER — MEPERIDINE HYDROCHLORIDE 25 MG/ML
12.5 INJECTION INTRAMUSCULAR; INTRAVENOUS; SUBCUTANEOUS
Status: DISCONTINUED | OUTPATIENT
Start: 2017-08-28 | End: 2017-08-28 | Stop reason: HOSPADM

## 2017-08-28 RX ORDER — KETOROLAC TROMETHAMINE 30 MG/ML
INJECTION, SOLUTION INTRAMUSCULAR; INTRAVENOUS PRN
Status: DISCONTINUED | OUTPATIENT
Start: 2017-08-28 | End: 2017-08-28

## 2017-08-28 RX ORDER — METHYLPREDNISOLONE ACETATE 40 MG/ML
INJECTION, SUSPENSION INTRA-ARTICULAR; INTRALESIONAL; INTRAMUSCULAR; SOFT TISSUE PRN
Status: DISCONTINUED | OUTPATIENT
Start: 2017-08-28 | End: 2017-08-28 | Stop reason: HOSPADM

## 2017-08-28 RX ORDER — PROMETHAZINE HYDROCHLORIDE 25 MG/ML
6.25 INJECTION, SOLUTION INTRAMUSCULAR; INTRAVENOUS
Status: DISCONTINUED | OUTPATIENT
Start: 2017-08-28 | End: 2017-08-28 | Stop reason: HOSPADM

## 2017-08-28 RX ORDER — CHLORHEXIDINE GLUCONATE 40 MG/ML
SOLUTION TOPICAL ONCE
Status: DISCONTINUED | OUTPATIENT
Start: 2017-08-28 | End: 2017-08-28 | Stop reason: HOSPADM

## 2017-08-28 RX ORDER — GLYCOPYRROLATE 0.2 MG/ML
INJECTION, SOLUTION INTRAMUSCULAR; INTRAVENOUS PRN
Status: DISCONTINUED | OUTPATIENT
Start: 2017-08-28 | End: 2017-08-28

## 2017-08-28 RX ORDER — METOPROLOL TARTRATE 1 MG/ML
1-2 INJECTION, SOLUTION INTRAVENOUS EVERY 5 MIN PRN
Status: DISCONTINUED | OUTPATIENT
Start: 2017-08-28 | End: 2017-08-28 | Stop reason: HOSPADM

## 2017-08-28 RX ORDER — ROPIVACAINE HYDROCHLORIDE 7.5 MG/ML
INJECTION, SOLUTION EPIDURAL; PERINEURAL PRN
Status: DISCONTINUED | OUTPATIENT
Start: 2017-08-28 | End: 2017-08-28 | Stop reason: HOSPADM

## 2017-08-28 RX ORDER — LIDOCAINE 40 MG/G
CREAM TOPICAL
Status: DISCONTINUED | OUTPATIENT
Start: 2017-08-28 | End: 2017-08-28 | Stop reason: HOSPADM

## 2017-08-28 RX ORDER — HYDROMORPHONE HYDROCHLORIDE 1 MG/ML
.3-.5 INJECTION, SOLUTION INTRAMUSCULAR; INTRAVENOUS; SUBCUTANEOUS EVERY 10 MIN PRN
Status: DISCONTINUED | OUTPATIENT
Start: 2017-08-28 | End: 2017-08-28 | Stop reason: HOSPADM

## 2017-08-28 RX ORDER — ALBUTEROL SULFATE 0.83 MG/ML
2.5 SOLUTION RESPIRATORY (INHALATION) EVERY 4 HOURS PRN
Status: DISCONTINUED | OUTPATIENT
Start: 2017-08-28 | End: 2017-08-28 | Stop reason: HOSPADM

## 2017-08-28 RX ORDER — ONDANSETRON 2 MG/ML
4 INJECTION INTRAMUSCULAR; INTRAVENOUS EVERY 30 MIN PRN
Status: DISCONTINUED | OUTPATIENT
Start: 2017-08-28 | End: 2017-08-28 | Stop reason: HOSPADM

## 2017-08-28 RX ORDER — NALOXONE HYDROCHLORIDE 0.4 MG/ML
.1-.4 INJECTION, SOLUTION INTRAMUSCULAR; INTRAVENOUS; SUBCUTANEOUS
Status: DISCONTINUED | OUTPATIENT
Start: 2017-08-28 | End: 2017-08-28 | Stop reason: HOSPADM

## 2017-08-28 RX ORDER — ONDANSETRON 4 MG/1
4 TABLET, ORALLY DISINTEGRATING ORAL EVERY 30 MIN PRN
Status: DISCONTINUED | OUTPATIENT
Start: 2017-08-28 | End: 2017-08-28 | Stop reason: HOSPADM

## 2017-08-28 RX ORDER — OXYCODONE AND ACETAMINOPHEN 5; 325 MG/1; MG/1
1-2 TABLET ORAL EVERY 4 HOURS PRN
Qty: 30 TABLET | Refills: 0 | Status: SHIPPED | OUTPATIENT
Start: 2017-08-28 | End: 2017-11-10

## 2017-08-28 RX ORDER — CLINDAMYCIN PHOSPHATE 900 MG/50ML
900 INJECTION, SOLUTION INTRAVENOUS
Status: COMPLETED | OUTPATIENT
Start: 2017-08-28 | End: 2017-08-28

## 2017-08-28 RX ORDER — FENTANYL CITRATE 50 UG/ML
INJECTION, SOLUTION INTRAMUSCULAR; INTRAVENOUS PRN
Status: DISCONTINUED | OUTPATIENT
Start: 2017-08-28 | End: 2017-08-28

## 2017-08-28 RX ORDER — ONDANSETRON 2 MG/ML
INJECTION INTRAMUSCULAR; INTRAVENOUS PRN
Status: DISCONTINUED | OUTPATIENT
Start: 2017-08-28 | End: 2017-08-28

## 2017-08-28 RX ORDER — DEXAMETHASONE SODIUM PHOSPHATE 4 MG/ML
INJECTION, SOLUTION INTRA-ARTICULAR; INTRALESIONAL; INTRAMUSCULAR; INTRAVENOUS; SOFT TISSUE PRN
Status: DISCONTINUED | OUTPATIENT
Start: 2017-08-28 | End: 2017-08-28

## 2017-08-28 RX ADMIN — EPHEDRINE SULFATE 10 MG: 50 INJECTION, SOLUTION INTRAVENOUS at 11:41

## 2017-08-28 RX ADMIN — GLYCOPYRROLATE 0.2 MG: 0.2 INJECTION, SOLUTION INTRAMUSCULAR; INTRAVENOUS at 11:31

## 2017-08-28 RX ADMIN — ONDANSETRON 4 MG: 2 INJECTION INTRAMUSCULAR; INTRAVENOUS at 11:49

## 2017-08-28 RX ADMIN — SODIUM CHLORIDE, POTASSIUM CHLORIDE, SODIUM LACTATE AND CALCIUM CHLORIDE: 600; 310; 30; 20 INJECTION, SOLUTION INTRAVENOUS at 11:42

## 2017-08-28 RX ADMIN — FENTANYL CITRATE 100 MCG: 50 INJECTION, SOLUTION INTRAMUSCULAR; INTRAVENOUS at 11:31

## 2017-08-28 RX ADMIN — DEXAMETHASONE SODIUM PHOSPHATE 4 MG: 4 INJECTION, SOLUTION INTRA-ARTICULAR; INTRALESIONAL; INTRAMUSCULAR; INTRAVENOUS; SOFT TISSUE at 11:31

## 2017-08-28 RX ADMIN — KETOROLAC TROMETHAMINE 15 MG: 30 INJECTION, SOLUTION INTRAMUSCULAR at 11:49

## 2017-08-28 RX ADMIN — MIDAZOLAM HYDROCHLORIDE 2 MG: 1 INJECTION, SOLUTION INTRAMUSCULAR; INTRAVENOUS at 11:22

## 2017-08-28 RX ADMIN — SODIUM CHLORIDE, POTASSIUM CHLORIDE, SODIUM LACTATE AND CALCIUM CHLORIDE: 600; 310; 30; 20 INJECTION, SOLUTION INTRAVENOUS at 11:22

## 2017-08-28 RX ADMIN — PROPOFOL 150 MG: 10 INJECTION, EMULSION INTRAVENOUS at 11:31

## 2017-08-28 RX ADMIN — CLINDAMYCIN PHOSPHATE 900 MG: 18 INJECTION, SOLUTION INTRAVENOUS at 11:22

## 2017-08-28 RX ADMIN — EPHEDRINE SULFATE 7.5 MG: 50 INJECTION, SOLUTION INTRAVENOUS at 11:47

## 2017-08-28 ASSESSMENT — LIFESTYLE VARIABLES: TOBACCO_USE: 1

## 2017-08-28 NOTE — DISCHARGE INSTRUCTIONS
GENERAL ANESTHESIA OR SEDATION ADULT DISCHARGE INSTRUCTIONS   SPECIAL PRECAUTIONS FOR 24 HOURS AFTER SURGERY    IT IS NOT UNUSUAL TO FEEL LIGHT-HEADED OR FAINT, UP TO 24 HOURS AFTER SURGERY OR WHILE TAKING PAIN MEDICATION.  IF YOU HAVE THESE SYMPTOMS; SIT FOR A FEW MINUTES BEFORE STANDING AND HAVE SOMEONE ASSIST YOU WHEN YOU GET UP TO WALK OR USE THE BATHROOM.    YOU SHOULD REST AND RELAX FOR THE NEXT 24 HOURS AND YOU MUST MAKE ARRANGEMENTS TO HAVE SOMEONE STAY WITH YOU FOR AT LEAST 24 HOURS AFTER YOUR DISCHARGE.  AVOID HAZARDOUS AND STRENUOUS ACTIVITIES.  DO NOT MAKE IMPORTANT DECISIONS FOR 24 HOURS.    DO NOT DRIVE ANY VEHICLE OR OPERATE MECHANICAL EQUIPMENT FOR 24 HOURS FOLLOWING THE END OF YOUR SURGERY.  EVEN THOUGH YOU MAY FEEL NORMAL, YOUR REACTIONS MAY BE AFFECTED BY THE MEDICATION YOU HAVE RECEIVED.    DO NOT DRINK ALCOHOLIC BEVERAGES FOR 24 HOURS FOLLOWING YOUR SURGERY.    DRINK CLEAR LIQUIDS (APPLE JUICE, GINGER ALE, 7-UP, BROTH, ETC.).  PROGRESS TO YOUR REGULAR DIET AS YOU FEEL ABLE.    YOU MAY HAVE A DRY MOUTH, A SORE THROAT, MUSCLES ACHES OR TROUBLE SLEEPING.  THESE SHOULD GO AWAY AFTER 24 HOURS.    CALL YOUR DOCTOR FOR ANY OF THE FOLLOWING:  SIGNS OF INFECTION (FEVER, GROWING TENDERNESS AT THE SURGERY SITE, A LARGE AMOUNT OF DRAINAGE OR BLEEDING, SEVERE PAIN, FOUL-SMELLING DRAINAGE, REDNESS OR SWELLING.    IT HAS BEEN OVER 8 TO 10 HOURS SINCE SURGERY AND YOU ARE STILL NOT ABLE TO URINATE (PASS WATER).         KNEE ARTHROSCOPY DISCHARGE INSTRUCTIONS  SONIA FRYE AND IVY GARCIA  [phone]    MEDICATION  Usually Ibuprofen or Naproxen will be sufficient for pain.  If not, you can take prescribed narcotic medication along with Ibuprofen/Naproxen for the first day or two.  It is best to wean off narcotic pain medication as soon as possible to minimize side effects such as nausea, constipation and reaction.    ACTIVITY  Unless specified after the surgery, you can put all your weight (or as much as  you can tolerate) on the surgical leg.  Crutches should be used until you feel safe without them.    DRESSING  Occasionally, there can be a significant amount of oozing of bloody fluid.  Even if that happens, please do not get alarmed.  Please apply extra material on top of the surgical dressing (such as additional gauze or towel) along with additional ace wrap.  Showering can start on post-operative day 2.  Put a band-aid over each portal hole until the holes are sealed up.  No soaking in the bathtub until instructed.  If the knee is swollen, apply the ace wrap after band-aids.    EXERCISES  Start range of motion exercises for the knee as soon as you can along with ankle pumping.  Initially, elevation of the leg for the first 2-4 days would help with pain and swelling.    DRIVING  You can drive as soon as you have control of the operated lower extremity and you are off the narcotic pain medication.    FOLLOW-UP  The post-operative visit should have been scheduled at the time you scheduled the surgery.  If it was not done, please call my office tomorrow.  For other urgent concerns, call [phone].    You received Toradol, an IV form of ibuprofen (Motrin) at 1149am.  Do not take any ibuprofen products until 0549pm.

## 2017-08-28 NOTE — OP NOTE
DATE OF SURGERY:  08/28/2017.      SURGEON:  Russell Garcia MD      ASSISTANT:  Juan Calderón PA-C      ANESTHESIA:  General.      PREOPERATIVE DIAGNOSIS:  Right knee medial meniscus tear.      POSTOPERATIVE DIAGNOSIS:  Right knee medial meniscus tear.      PROCEDURE PERFORMED:  Arthroscopic partial medial meniscectomy.      INDICATIONS FOR PROCEDURE:  Yesenia Chua is a 71-year-old gentleman with a symptomatic medial meniscus tear who elected to undergo the above-stated procedure, fully understanding the potential risks as well as benefits of this procedure.      OPERATIVE NOTE:  Yesenia was brought to the operating room, placed in a supine position on the operating table, where general anesthesia was administered without difficulty.  No tourniquet was utilized.  The right lower extremity was scrubbed and prepped in the usual sterile fashion and draped sterilely.      Using standard parapatellar tendon and supramedial arthroscopic portals, the right knee was entered with the arthroscope and instrumentation.  There was relatively little synovial fluid and no synovitis.  There was relatively little chondromalacia in any compartment.  The notch appeared normal.  There was a pedunculated posterior horn medial meniscus tear which was debrided back to a stable edge.  Following this partial medial meniscectomy, the knee was examined for any further pathology and none was noted.  The arthroscopic instrumentation was removed and the portals were closed with nylon suture.  Following closure and prior to a sterile dressing, 40 mg of Kenalog and 10 mL of 0.5% ropivacaine were instilled into his knee.  Yan tolerated the procedure well and left the operating room in satisfactory and stable condition.      ESTIMATED BLOOD LOSS:  10 mL.      SPONGE AND NEEDLE COUNT:  Correct x2.         RUSSELL GARCIA MD             D: 08/28/2017 12:08   T: 08/28/2017 12:57   MT: TS      Name:     YESENIA CHUA   MRN:       [id number]        Account:        [id number]   :      1945           Procedure Date: 2017      Document: [id number]       cc: Russell Crawford MD

## 2017-08-28 NOTE — ANESTHESIA CARE TRANSFER NOTE
Patient: Guillermo Hurst    Procedure(s):  Right Knee Arthroscopy, partial medial meniscectomy - Wound Class: I-Clean    Diagnosis: Meniscal Tear   Diagnosis Additional Information: No value filed.    Anesthesia Type:   General, LMA     Note:  Airway :Face Mask  Patient transferred to:PACU  Comments: To PACU, Awake, VSS, SV, O2, Report to RN      Vitals: (Last set prior to Anesthesia Care Transfer)    CRNA VITALS  8/28/2017 1135 - 8/28/2017 1211      8/28/2017             Pulse: 82    SpO2: 97 %    Resp Rate (observed): 13                Electronically Signed By: Dean Dennis Severson, APRN CRNA  August 28, 2017  12:11 PM

## 2017-08-28 NOTE — IP AVS SNAPSHOT
[facility] PreOP/PostOP    [address]    [facility] [address]    Phone:  [phone]    Fax:  [phone]                                       After Visit Summary   8/28/2017    Guillermo Hurst    MRN: [id number]           After Visit Summary Signature Page     I have received my discharge instructions, and my questions have been answered. I have discussed any challenges I see with this plan with the nurse or doctor.    ..........................................................................................................................................  Patient/Patient Representative Signature      ..........................................................................................................................................  Patient Representative Print Name and Relationship to Patient    ..................................................               ................................................  Date                                            Time    ..........................................................................................................................................  Reviewed by Signature/Title    ...................................................              ..............................................  Date                                                            Time

## 2017-08-28 NOTE — ANESTHESIA PREPROCEDURE EVALUATION
Anesthesia Evaluation     .             ROS/MED HX    ENT/Pulmonary:     (+)tobacco use, Past use , . .    Neurologic:  - neg neurologic ROS     Cardiovascular: Comment: LBBB    (+) hypertension----. : . . . :. .       METS/Exercise Tolerance:     Hematologic:  - neg hematologic  ROS       Musculoskeletal:  - neg musculoskeletal ROS       GI/Hepatic:  - neg GI/hepatic ROS       Renal/Genitourinary:  - ROS Renal section negative       Endo: Comment: .Body mass index is 29.27 kg/(m^2).                     Psychiatric:  - neg psychiatric ROS       Infectious Disease:  - neg infectious disease ROS       Malignancy:         Other: Comment: .Lab Test        08/06/17     03/18/13     08/27/10                       2330          1547          0942          WBC          9.1          11.7*        8.2           HGB          13.7         14.0         14.2          MCV          91           92           87            PLT          238          226          254           INR          0.95          --           --            Lab Test        08/06/17     11/15/16     11/11/15                       2330          0958          0928          NA           139          141          136           POTASSIUM    4.0          4.3          4.3           CHLORIDE     107          106          104           CO2          26           27           26            BUN          29           18           18            CR           1.10         1.02         1.09          ANIONGAP     6            8            6             SUSIE          8.8          9.2          9.2           GLC          103*         104*         98                                Physical Exam  Normal systems: cardiovascular and pulmonary    Airway   Mallampati: II    Dental     Cardiovascular   Rhythm and rate: regular and normal      Pulmonary    breath sounds clear to auscultation                    Anesthesia Plan      History & Physical Review  History and physical reviewed and  following examination; no interval change.    ASA Status:  3 .        Plan for General and LMA with Intravenous induction. Maintenance will be Inhalation and Balanced.    PONV prophylaxis:  Ondansetron (or other 5HT-3) and Dexamethasone or Solumedrol       Postoperative Care      Consents  Anesthetic plan, risks, benefits and alternatives discussed with:  Patient or representative..                          .

## 2017-08-28 NOTE — ANESTHESIA POSTPROCEDURE EVALUATION
Patient: Guillermo Hurst    Procedure(s):  Right Knee Arthroscopy, partial medial meniscectomy - Wound Class: I-Clean    Diagnosis:Meniscal Tear   Diagnosis Additional Information: Right knee medial meniscus tear.     Anesthesia Type:  General, LMA    Note:  Anesthesia Post Evaluation    Patient location during evaluation: PACU  Patient participation: Able to fully participate in evaluation  Level of consciousness: awake  Pain management: adequate  Airway patency: patent  Cardiovascular status: acceptable  Respiratory status: acceptable  Hydration status: acceptable  PONV: controlled     Anesthetic complications: None          Last vitals:  Vitals:    08/28/17 1255 08/28/17 1314 08/28/17 1431   BP:  136/81 138/82   Pulse:      Resp: 28 12 16   Temp:  97.5  F (36.4  C)    SpO2: 98% 99% 96%         Electronically Signed By: Remy Zaidi DO  August 28, 2017  3:08 PM

## 2017-08-28 NOTE — IP AVS SNAPSHOT
MRN:[id number]                      After Visit Summary   8/28/2017    Guillermo Hurst    MRN: [id number]           Thank you!     Thank you for choosing [facility] for your care. Our goal is always to provide you with excellent care. Hearing back from our patients is one way we can continue to improve our services. Please take a few minutes to complete the written survey that you may receive in the mail after you visit. If you would like to speak to someone directly about your visit please contact Patient Relations at [phone]. Thank you!          Patient Information     Date Of Birth          1945        About your hospital stay     You were admitted on:  August 28, 2017 You last received care in the:  [facility] PreOP/PostOP    You were discharged on:  August 28, 2017        Reason for your hospital stay       Right knee arthroscopy, uneventful                  Who to Call     For medical emergencies, please call 911.  For non-urgent questions about your medical care, please call your primary care provider or clinic, [phone]  For questions related to your surgery, please call your surgery clinic        Attending Provider     Provider Specialty    Russell Crawford MD Orthopedics       Primary Care Provider Office Phone # Fax #    Juliocesar Tuttle [phone]      After Care Instructions     Activity       Your activity upon discharge: activity as tolerated            Diet       Follow this diet upon discharge: regular            Wound care and dressings       Instructions to care for your wound at home: daily dressing changes.                  Follow-up Appointments     Follow-up and recommended labs and tests        10-14 days                  Your next 10 appointments already scheduled     Sep 07, 2017 11:20 AM CDT   Return Visit with Juan Calderón PA-C   [facility] ORTHOPEDIC SURGERY ([facility])     [address]  [facility] [address]   [phone]              Further instructions from your care team       GENERAL ANESTHESIA OR SEDATION ADULT DISCHARGE INSTRUCTIONS   SPECIAL PRECAUTIONS FOR 24 HOURS AFTER SURGERY    IT IS NOT UNUSUAL TO FEEL LIGHT-HEADED OR FAINT, UP TO 24 HOURS AFTER SURGERY OR WHILE TAKING PAIN MEDICATION.  IF YOU HAVE THESE SYMPTOMS; SIT FOR A FEW MINUTES BEFORE STANDING AND HAVE SOMEONE ASSIST YOU WHEN YOU GET UP TO WALK OR USE THE BATHROOM.    YOU SHOULD REST AND RELAX FOR THE NEXT 24 HOURS AND YOU MUST MAKE ARRANGEMENTS TO HAVE SOMEONE STAY WITH YOU FOR AT LEAST 24 HOURS AFTER YOUR DISCHARGE.  AVOID HAZARDOUS AND STRENUOUS ACTIVITIES.  DO NOT MAKE IMPORTANT DECISIONS FOR 24 HOURS.    DO NOT DRIVE ANY VEHICLE OR OPERATE MECHANICAL EQUIPMENT FOR 24 HOURS FOLLOWING THE END OF YOUR SURGERY.  EVEN THOUGH YOU MAY FEEL NORMAL, YOUR REACTIONS MAY BE AFFECTED BY THE MEDICATION YOU HAVE RECEIVED.    DO NOT DRINK ALCOHOLIC BEVERAGES FOR 24 HOURS FOLLOWING YOUR SURGERY.    DRINK CLEAR LIQUIDS (APPLE JUICE, GINGER ALE, 7-UP, BROTH, ETC.).  PROGRESS TO YOUR REGULAR DIET AS YOU FEEL ABLE.    YOU MAY HAVE A DRY MOUTH, A SORE THROAT, MUSCLES ACHES OR TROUBLE SLEEPING.  THESE SHOULD GO AWAY AFTER 24 HOURS.    CALL YOUR DOCTOR FOR ANY OF THE FOLLOWING:  SIGNS OF INFECTION (FEVER, GROWING TENDERNESS AT THE SURGERY SITE, A LARGE AMOUNT OF DRAINAGE OR BLEEDING, SEVERE PAIN, FOUL-SMELLING DRAINAGE, REDNESS OR SWELLING.    IT HAS BEEN OVER 8 TO 10 HOURS SINCE SURGERY AND YOU ARE STILL NOT ABLE TO URINATE (PASS WATER).         KNEE ARTHROSCOPY DISCHARGE INSTRUCTIONS  SONIA FRYE AND IVY GARCIA  [phone]    MEDICATION  Usually Ibuprofen or Naproxen will be sufficient for pain.  If not, you can take prescribed narcotic medication along with Ibuprofen/Naproxen for the first day or two.  It is best to wean off narcotic pain medication as soon as possible to minimize side effects such as  "nausea, constipation and reaction.    ACTIVITY  Unless specified after the surgery, you can put all your weight (or as much as you can tolerate) on the surgical leg.  Crutches should be used until you feel safe without them.    DRESSING  Occasionally, there can be a significant amount of oozing of bloody fluid.  Even if that happens, please do not get alarmed.  Please apply extra material on top of the surgical dressing (such as additional gauze or towel) along with additional ace wrap.  Showering can start on post-operative day 2.  Put a band-aid over each portal hole until the holes are sealed up.  No soaking in the bathtub until instructed.  If the knee is swollen, apply the ace wrap after band-aids.    EXERCISES  Start range of motion exercises for the knee as soon as you can along with ankle pumping.  Initially, elevation of the leg for the first 2-4 days would help with pain and swelling.    DRIVING  You can drive as soon as you have control of the operated lower extremity and you are off the narcotic pain medication.    FOLLOW-UP  The post-operative visit should have been scheduled at the time you scheduled the surgery.  If it was not done, please call my office tomorrow.  For other urgent concerns, call [phone].    You received Toradol, an IV form of ibuprofen (Motrin) at 1149am.  Do not take any ibuprofen products until 0549pm.    Pending Results     No orders found from 8/26/2017 to 8/29/2017.            Admission Information     Date & Time Provider Department Dept. Phone    8/28/2017 Russell Crawford MD [facility] PreOP/PostOP [phone]      Your Vitals Were     Blood Pressure Pulse Temperature Respirations Height Weight    136/81 (BP Location: Right arm) 72 97.5  F (36.4  C) (Temporal) 12 1.778 m (5' 10\") 92.5 kg (204 lb)    Pulse Oximetry BMI (Body Mass Index)                99% 29.27 kg/m2          MyChart Information     [employer] gives you secure access to your electronic health " record. If you see a primary care provider, you can also send messages to your care team and make appointments. If you have questions, please call your primary care clinic.  If you do not have a primary care provider, please call [phone] and they will assist you.        Care EveryWhere ID     This is your Care EveryWhere ID. This could be used by other organizations to access your [facility] medical records  [id number]        Equal Access to Services     KYM ROBB : Hadhannah santoso Sonic, waaxda luqadaha, qaybta kaalmada adechinyada, lissa amadoradriannemira cruz . So [facility] [phone].    ATENCIÓN: Si habla español, tiene a tobar disposición servicios gratuitos de asistencia lingüística. Llame al [phone].    We comply with applicable federal civil rights laws and [place] laws. We do not discriminate on the basis of race, color, national origin, age, disability sex, sexual orientation or gender identity.               Review of your medicines      UNREVIEWED medicines. Ask your doctor about these medicines        Dose / Directions    aspirin 81 MG tablet        1 tab po QD (Once per day)   Quantity:      Refills:          calcium carbonate 1250 MG tablet   Commonly known as:  OS-SUSIE 500 mg [place]. [place]        Dose:  1 tablet   Take 1 tablet by mouth daily   Refills:  0       CENTRUM SILVER ADULT 50+ Tabs        Dose:  1 tablet   Take 1 tablet by mouth daily   Refills:  0       lisinopril 20 MG tablet   Commonly known as:  PRINIVIL/ZESTRIL   Used for:  Essential hypertension, benign        Dose:  20 mg   Take 1 tablet (20 mg) by mouth daily   Quantity:  90 tablet   Refills:  3       magnesium 250 MG tablet        Dose:  1 tablet   Take 1 tablet by mouth daily   Refills:  0       simvastatin 20 MG tablet   Commonly known as:  ZOCOR   Used for:  Hyperlipidemia LDL goal <100        Dose:  20 mg   Take 1 tablet (20 mg) by mouth At Bedtime   Quantity:  90 tablet   Refills:  3         START taking         Dose / Directions    oxyCODONE-acetaminophen 5-325 MG per tablet   Commonly known as:  PERCOCET   Used for:  Old peripheral tear of lateral meniscus of right knee        Dose:  1-2 tablet   Take 1-2 tablets by mouth every 4 hours as needed for pain maximum 8 tablet(s) per day   Quantity:  30 tablet   Refills:  0            Where to get your medicines      Some of these will need a paper prescription and others can be bought over the counter. Ask your nurse if you have questions.     Bring a paper prescription for each of these medications     oxyCODONE-acetaminophen 5-325 MG per tablet                Protect others around you: Learn how to safely use, store and throw away your medicines at www.disposemymeds.org.             Medication List: This is a list of all your medications and when to take them. Check marks below indicate your daily home schedule. Keep this list as a reference.      Medications           Morning Afternoon Evening Bedtime As Needed    aspirin 81 MG tablet   1 tab po QD (Once per day)                                calcium carbonate 1250 MG tablet   Commonly known as:  OS-SUSIE 500 mg [place]. Ca   Take 1 tablet by mouth daily                                CENTRUM SILVER ADULT 50+ Tabs   Take 1 tablet by mouth daily                                lisinopril 20 MG tablet   Commonly known as:  PRINIVIL/ZESTRIL   Take 1 tablet (20 mg) by mouth daily                                magnesium 250 MG tablet   Take 1 tablet by mouth daily                                oxyCODONE-acetaminophen 5-325 MG per tablet   Commonly known as:  PERCOCET   Take 1-2 tablets by mouth every 4 hours as needed for pain maximum 8 tablet(s) per day                                simvastatin 20 MG tablet   Commonly known as:  ZOCOR   Take 1 tablet (20 mg) by mouth At Bedtime

## 2017-08-29 ENCOUNTER — TELEPHONE (OUTPATIENT)
Dept: ORTHOPEDICS | Facility: CLINIC | Age: 72
End: 2017-08-29

## 2017-08-29 NOTE — TELEPHONE ENCOUNTER
Spoke with patient.  Doing well, no questions at this time.  Offered number for nurse triage and confirmed follow up appointment.        Juan Calderón PA-C  [facility] - Surgery

## 2017-09-07 ENCOUNTER — OFFICE VISIT (OUTPATIENT)
Dept: ORTHOPEDICS | Facility: CLINIC | Age: 72
End: 2017-09-07
Payer: COMMERCIAL

## 2017-09-07 DIAGNOSIS — Z47.89 ORTHOPEDIC AFTERCARE: Primary | ICD-10-CM

## 2017-09-07 PROCEDURE — 99024 POSTOP FOLLOW-UP VISIT: CPT | Performed by: PHYSICIAN ASSISTANT

## 2017-09-07 NOTE — MR AVS SNAPSHOT
After Visit Summary   9/7/2017    Guillermo Hurst    MRN: [id number]           Patient Information     Date Of Birth          1945        Visit Information        Provider Department      9/7/2017 11:20 AM Juan Calderón PA-C [facility] ORTHOPEDIC SURGERY        Today's Diagnoses     Orthopedic aftercare    -  1      Care Instructions      Incision Care:  Sutures were removed and Steri-Strips applied in usual fashion.  Keep dry 24-48 hours.  Showering ok after that time, however no soaking or scrubbing of incision for 1 weeks.  Steri-strips will most likely fall off on their own, however they may be removed after 1 weeks with rubbing alcohol if they have not.    If draining or bleeding stops the tape-strips are enough coverage unless you were instructed otherwise or you would like to cover for comfort.  If drainage or bleeding continues please cover with clean dressings.     Gradually increase your activities as you can tolerated them, starting at a level well below what you would normally do.   Follow up as needed in clinic.            Follow-ups after your visit        Follow-up notes from your care team     Return if symptoms worsen or fail to improve.      Who to contact     If you have questions or need follow up information about today's clinic visit or your schedule please contact [facility] ORTHOPEDIC SURGERY directly at [phone].  Normal or non-critical lab and imaging results will be communicated to you by ParentPlushart, letter or phone within 4 business days after the clinic has received the results. If you do not hear from us within 7 days, please contact the clinic through ParentPlushart or phone. If you have a critical or abnormal lab result, we will notify you by phone as soon as possible.  Submit refill requests through [employer] or call your pharmacy and they will forward the refill request to us. Please allow 3 business days for your refill to be completed.           Additional Information About Your Visit        MyChart Information     [email or web address] gives you secure access to your electronic health record. If you see a primary care provider, you can also send messages to your care team and make appointments. If you have questions, please call your primary care clinic.  If you do not have a primary care provider, please call [phone] and they will assist you.        Care EveryWhere ID     This is your Care EveryWhere ID. This could be used by other organizations to access your [place] medical records  [id number]         Blood Pressure from Last 3 Encounters:   08/28/17 138/82   08/24/17 124/68   08/11/17 138/84    Weight from Last 3 Encounters:   08/28/17 204 lb (92.5 kg)   08/24/17 206 lb 2 oz (93.5 kg)   08/23/17 209 lb (94.8 kg)              Today, you had the following     No orders found for display         Today's Medication Changes          These changes are accurate as of: 9/7/17 11:51 AM.  If you have any questions, ask your nurse or doctor.               These medicines have changed or have updated prescriptions.        Dose/Directions    aspirin 81 MG tablet   This may have changed:  See the new instructions.        1 tab po QD (Once per day)   Quantity:      Refills:                   Primary Care Provider Office Phone # Fax #    Juliocesar Tuttle [phone]       [address] [place] [phone]        Equal Access to Services     [facility]: Hadii lenny Coronado, waaxda ralph, qaybta darrelallissa mays . So [facility] [phone].    ATENCIÓN: Si habla español, tiene a tobar disposición servicios gratuitos de asistencia lingüística. Llame al [phone].    We comply with applicable federal civil rights laws and [place] laws. We do not discriminate on the basis of race, color, national origin, age, disability sex, sexual orientation or gender identity.            Thank you!     Thank  you for choosing [facility] ORTHOPEDIC SURGERY  for your care. Our goal is always to provide you with excellent care. Hearing back from our patients is one way we can continue to improve our services. Please take a few minutes to complete the written survey that you may receive in the mail after your visit with us. Thank you!             Your Updated Medication List - Protect others around you: Learn how to safely use, store and throw away your medicines at www.disposemymeds.org.          This list is accurate as of: 9/7/17 11:51 AM.  Always use your most recent med list.                   Brand Name Dispense Instructions for use Diagnosis    aspirin 81 MG tablet          1 tab po QD (Once per day)        calcium carbonate 1250 MG tablet    OS-SUSIE 500 mg [place]. Ca     Take 1 tablet by mouth daily        CENTRUM SILVER ADULT 50+ Tabs      Take 1 tablet by mouth daily        lisinopril 20 MG tablet    PRINIVIL/ZESTRIL    90 tablet    Take 1 tablet (20 mg) by mouth daily    Essential hypertension, benign       magnesium 250 MG tablet      Take 1 tablet by mouth daily        oxyCODONE-acetaminophen 5-325 MG per tablet    PERCOCET    30 tablet    Take 1-2 tablets by mouth every 4 hours as needed for pain maximum 8 tablet(s) per day    Old peripheral tear of lateral meniscus of right knee       simvastatin 20 MG tablet    ZOCOR    90 tablet    Take 1 tablet (20 mg) by mouth At Bedtime    Hyperlipidemia LDL goal <100

## 2017-09-07 NOTE — PROGRESS NOTES
HISTORY OF PRESENT ILLNESS:    Guillermo Hurst is a 72 year old male who is seen in follow up for Right knee arthro, part med men, DOS 8/28/17, Dr. Crawford.  Present symptoms: Pt reports significant improvement in symptoms.  No limp, no pain with pressure, no swelling..  Treatments include RICE first 6 days.  Using nothing for ambulation.  Overall very satisfied.  No new complaints.  Denies Chest pain, Calve pain, Fever, Chills.    PHYSICAL EXAM:  There were no vitals taken for this visit.  There is no height or weight on file to calculate BMI.   GENERAL APPEARANCE: healthy, alert and no distress   PSYCH:  mentation appears normal and affect normal/bright    MSK:  Right:  Knee.  Ambulates: wng.  Incision clean and dry, sutures present, healing.  Appropriate incisional erythema.   No Ecchymosis.  No calve pain on palpation.  Edema min at knee none BK.  CMS: behzad incisional numbness, otherwise grossly intact.  AROM Flexion WNL.  NOTE right LL atrophty due to prior LB issues..    IMAGING INTERPRETATION:  None today.     ASSESSMENT:  Guillermo Hurst is a 72 year old male S/P Right knee arthro, part med men, DOS 8/28/17, Dr. Crawford..  Improved, healing incisions.    PLAN:  - Surgery discussed, images reviewed if applicable, and all questions were answered at this time.  - sutures removed with sterile technique, steri-strips applied in usual fashion, care instructions given and verbally acknowledged.  - Medications: OTC PRN.  - Physical Therapy: none at this time  - AAT.    Return to clinic PRN    Juan Calderón PA-C    Dept. Orthopedic Surgery  [facility]   9/7/2017

## 2017-09-07 NOTE — PATIENT INSTRUCTIONS
Incision Care:  Sutures were removed and Steri-Strips applied in usual fashion.  Keep dry 24-48 hours.  Showering ok after that time, however no soaking or scrubbing of incision for 1 weeks.  Steri-strips will most likely fall off on their own, however they may be removed after 1 weeks with rubbing alcohol if they have not.    If draining or bleeding stops the tape-strips are enough coverage unless you were instructed otherwise or you would like to cover for comfort.  If drainage or bleeding continues please cover with clean dressings.     Gradually increase your activities as you can tolerated them, starting at a level well below what you would normally do.   Follow up as needed in clinic.

## 2017-09-17 ENCOUNTER — OFFICE VISIT (OUTPATIENT)
Dept: URGENT CARE | Facility: URGENT CARE | Age: 72
End: 2017-09-17
Payer: COMMERCIAL

## 2017-09-17 VITALS
TEMPERATURE: 98.5 F | OXYGEN SATURATION: 97 % | SYSTOLIC BLOOD PRESSURE: 122 MMHG | HEART RATE: 59 BPM | DIASTOLIC BLOOD PRESSURE: 79 MMHG

## 2017-09-17 DIAGNOSIS — R22.0 LIP SWELLING: Primary | ICD-10-CM

## 2017-09-17 PROCEDURE — 99214 OFFICE O/P EST MOD 30 MIN: CPT | Performed by: FAMILY MEDICINE

## 2017-09-17 RX ORDER — PREDNISONE 20 MG/1
40 TABLET ORAL DAILY
Qty: 10 TABLET | Refills: 0 | Status: SHIPPED | OUTPATIENT
Start: 2017-09-17 | End: 2017-09-22

## 2017-09-17 RX ORDER — HYDROXYZINE HYDROCHLORIDE 25 MG/1
25-50 TABLET, FILM COATED ORAL EVERY 6 HOURS PRN
Qty: 30 TABLET | Refills: 1 | Status: SHIPPED | OUTPATIENT
Start: 2017-09-17 | End: 2017-11-10

## 2017-09-17 NOTE — NURSING NOTE
"Chief Complaint   Patient presents with     Urgent Care     Oral Swelling     Upper lip swelling       Initial /79 (BP Location: Right arm, Patient Position: Chair, Cuff Size: Adult Regular)  Pulse 59  Temp 98.5  F (36.9  C) (Oral)  SpO2 97% Estimated body mass index is 29.27 kg/(m^2) as calculated from the following:    Height as of 8/28/17: 5' 10\" (1.778 m).    Weight as of 8/28/17: 204 lb (92.5 kg).  Medication Reconciliation: complete     Dunia Quintanilla CMA (AAMA)        "

## 2017-09-17 NOTE — MR AVS SNAPSHOT
After Visit Summary   9/17/2017    Guillermo Hurst    MRN: [id number]           Patient Information     Date Of Birth          1945        Visit Information        Provider Department      9/17/2017 11:15 AM Saleem Sotelo MD [facility] URGENT CARE        Today's Diagnoses     Lip swelling    -  1       Follow-ups after your visit        Who to contact     If you have questions or need follow up information about today's clinic visit or your schedule please contact [facility] URGENT CARE directly at [phone].  Normal or non-critical lab and imaging results will be communicated to you by REMOTVhart, letter or phone within 4 business days after the clinic has received the results. If you do not hear from us within 7 days, please contact the clinic through OUYAt or phone. If you have a critical or abnormal lab result, we will notify you by phone as soon as possible.  Submit refill requests through [employer] or call your pharmacy and they will forward the refill request to us. Please allow 3 business days for your refill to be completed.          Additional Information About Your Visit        MyChart Information     [employer] gives you secure access to your electronic health record. If you see a primary care provider, you can also send messages to your care team and make appointments. If you have questions, please call your primary care clinic.  If you do not have a primary care provider, please call [phone] and they will assist you.        Care EveryWhere ID     This is your Care EveryWhere ID. This could be used by other organizations to access your [facility] medical records  [id number]        Your Vitals Were     Pulse Temperature Pulse Oximetry             59 98.5  F (36.9  C) (Oral) 97%          Blood Pressure from Last 3 Encounters:   09/17/17 122/79   08/28/17 138/82   08/24/17 124/68    Weight from Last 3 Encounters:   08/28/17 204 lb (92.5 kg)   08/24/17 206 lb 2 oz (93.5  kg)   08/23/17 209 lb (94.8 kg)              Today, you had the following     No orders found for display         Today's Medication Changes          These changes are accurate as of: 9/17/17  5:53 PM.  If you have any questions, ask your nurse or doctor.               Start taking these medicines.        Dose/Directions    * hydrOXYzine 25 MG tablet   Commonly known as:  ATARAX   Used for:  Lip swelling        Dose:  25-50 mg   Take 1-2 tablets (25-50 mg) by mouth every 6 hours as needed for itching   Quantity:  30 tablet   Refills:  1       * hydrOXYzine 25 MG tablet   Commonly known as:  ATARAX   Used for:  Lip swelling        Dose:  25-50 mg   Take 1-2 tablets (25-50 mg) by mouth every 6 hours as needed for itching   Quantity:  30 tablet   Refills:  1       predniSONE 20 MG tablet   Commonly known as:  DELTASONE   Used for:  Lip swelling        Dose:  40 mg   Take 2 tablets (40 mg) by mouth daily for 5 days   Quantity:  10 tablet   Refills:  0       * Notice:  This list has 2 medication(s) that are the same as other medications prescribed for you. Read the directions carefully, and ask your doctor or other care provider to review them with you.      These medicines have changed or have updated prescriptions.        Dose/Directions    aspirin 81 MG tablet   This may have changed:  See the new instructions.        1 tab po QD (Once per day)   Quantity:      Refills:               Where to get your medicines      These medications were sent to [facility] [address] [place] [address] [address] AT [place] [address] [address]  [address], [facility] [address]     Phone:  [phone]     hydrOXYzine 25 MG tablet    hydrOXYzine 25 MG tablet         Some of these will need a paper prescription and others can be bought over the counter.  Ask your nurse if you have questions.     Bring a paper prescription for each of these medications     predniSONE 20 MG tablet                Primary Care  Provider Office Phone # Fax #    Juliocesar Tuttle [phone]       [address] [place] [phone]        Equal Access to Services     [facility] : Usman lenny warner tomo Sobalaali, waaxda luqadaha, qaybta kaalmada adeemily, lissa saezcristopher ang. So [facility] [phone].    ATENCIÓN: Si habla español, tiene a tobar disposición servicios gratuitos de asistencia lingüística. Llame al [phone].    We comply with applicable federal civil rights laws and [place] laws. We do not discriminate on the basis of race, color, national origin, age, disability sex, sexual orientation or gender identity.            Thank you!     Thank you for choosing [facility] URGENT CARE  for your care. Our goal is always to provide you with excellent care. Hearing back from our patients is one way we can continue to improve our services. Please take a few minutes to complete the written survey that you may receive in the mail after your visit with us. Thank you!             Your Updated Medication List - Protect others around you: Learn how to safely use, store and throw away your medicines at www.disposemymeds.org.          This list is accurate as of: 9/17/17  5:53 PM.  Always use your most recent med list.                   Brand Name Dispense Instructions for use Diagnosis    aspirin 81 MG tablet          1 tab po QD (Once per day)        calcium carbonate 1250 MG tablet    OS-SUSIE 500 mg [place]. Ca     Take 1 tablet by mouth daily        CENTRUM SILVER ADULT 50+ Tabs      Take 1 tablet by mouth daily        * hydrOXYzine 25 MG tablet    ATARAX    30 tablet    Take 1-2 tablets (25-50 mg) by mouth every 6 hours as needed for itching    Lip swelling       * hydrOXYzine 25 MG tablet    ATARAX    30 tablet    Take 1-2 tablets (25-50 mg) by mouth every 6 hours as needed for itching    Lip swelling       lisinopril 20 MG tablet    PRINIVIL/ZESTRIL    90 tablet    Take 1 tablet (20 mg) by  mouth daily    Essential hypertension, benign       magnesium 250 MG tablet      Take 1 tablet by mouth daily        oxyCODONE-acetaminophen 5-325 MG per tablet    PERCOCET    30 tablet    Take 1-2 tablets by mouth every 4 hours as needed for pain maximum 8 tablet(s) per day    Old peripheral tear of lateral meniscus of right knee       predniSONE 20 MG tablet    DELTASONE    10 tablet    Take 2 tablets (40 mg) by mouth daily for 5 days    Lip swelling       simvastatin 20 MG tablet    ZOCOR    90 tablet    Take 1 tablet (20 mg) by mouth At Bedtime    Hyperlipidemia LDL goal <100       * Notice:  This list has 2 medication(s) that are the same as other medications prescribed for you. Read the directions carefully, and ask your doctor or other care provider to review them with you.

## 2017-09-17 NOTE — PROGRESS NOTES
SUBJECTIVE:   Guillermo Hurst is a 72 year old male who presents to clinic today for the following health issues:      Swollen lip      Duration: 1 day    Description (location/character/radiation): Upper lip, denies itchiness and pain    Intensity:  moderate    Accompanying signs and symptoms: just swollen    History (similar episodes/previous evaluation): None    Precipitating or alleviating factors: None    Therapies tried and outcome: None             Problem list and histories reviewed & adjusted, as indicated.  Additional history:     Patient Active Problem List   Diagnosis     Essential hypertension, benign     Advanced directives, counseling/discussion     Former smoker     Hyperlipidemia LDL goal <100     Pulmonary nodule, left     Left bundle branch block     Past Surgical History:   Procedure Laterality Date     ARTHROSCOPY KNEE Right 8/28/2017    Procedure: ARTHROSCOPY KNEE;  Arthroscopic partial medial meniscectomy, right knee;  Surgeon: Russell Crawford MD;  Location: [facility] OR     C BORGES W/O FACETEC FORAMOT/DSKC 1/2 VRT SEG, LUMBAR  1975    laminectomy     C BORGES W/O FACETEC FORAMOT/DSKC 1/2 VRT SEG, LUMBAR  1985    laminectomy     C NONSPECIFIC PROCEDURE  1983    R ear surgery due to recurrent infections     C NONSPECIFIC PROCEDURE  2002    LAMINECTOMY AND SPINAL FUSION , FLEXIBLE STRUT , 3 LEVELS     COLONOSCOPY       ENT SURGERY      Both ears       Social History   Substance Use Topics     Smoking status: Former Smoker     Packs/day: 1.00     Years: 20.00     Types: Cigarettes     Quit date: 8/1/1985     Smokeless tobacco: Never Used     Alcohol use 0.0 oz/week     0 Standard drinks or equivalent per week      Comment: Avg (1.5 per day, wine, beer or cocktail)     Family History   Problem Relation Age of Onset     Cardiovascular Brother      b:1928  hx bypass, HTN     C.A.D. Brother      Coronary Artery Disease Brother      Hypertension Brother      Respiratory Sister      b:1932  asthma and  allergies     DIABETES Sister      Cardiovascular Sister      pacemaker and bypass surgery     Coronary Artery Disease Sister      Hypertension Sister      Asthma Sister      Circulatory Sister      b:1936 Raynaud's phenomenon     CANCER Brother      d:age 55  hx esophogeal then liver cancer.     Alcohol/Drug Brother      alcoholic - sober at time of death     Obesity Brother      C.A.D. Brother      Hypertension Brother      Chemical Addiction Brother      CANCER Brother      brain cancer     C.A.D. Brother      Family History Negative Sister      b:1945     Circulatory Mother      b:1908  hx of L leg amputation due to circulatory problems in , HTN     Hypertension Mother      Cardiovascular Father      d:age 53 after second MI     Alcohol/Drug Father      alcoholic, sober 5 yrs prior to death     Coronary Artery Disease Father           CANCER Paternal Grandmother      lung cancer             Reviewed and updated as needed this visit by clinical staff     Reviewed and updated as needed this visit by Provider         ROS:  Constitutional, HEENT, cardiovascular, pulmonary, gi and gu systems are negative, except as otherwise noted.      OBJECTIVE:   /79 (BP Location: Right arm, Patient Position: Chair, Cuff Size: Adult Regular)  Pulse 59  Temp 98.5  F (36.9  C) (Oral)  SpO2 97%  There is no height or weight on file to calculate BMI.  GENERAL: alert and mild distress  HENT: normal cephalic/atraumatic, ear canals and TM's normal, nose and mouth without ulcers or lesions, oropharynx clear, oral mucous membranes moist and upper lip swollen  NECK: no adenopathy, no asymmetry, masses, or scars and thyroid normal to palpation  RESP: lungs clear to auscultation - no rales, rhonchi or wheezes  CV: regular rate and rhythm, normal S1 S2, no S3 or S4, no murmur, click or rub, no peripheral edema and peripheral pulses strong  ABDOMEN: soft, nontender, no hepatosplenomegaly, no masses and bowel sounds  normal  MS: no gross musculoskeletal defects noted, no edema    Diagnostic Test Results:  none    ASSESSMENT/PLAN:               ICD-10-CM    1. Lip swelling R22.0 hydrOXYzine (ATARAX) 25 MG tablet     predniSONE (DELTASONE) 20 MG tablet     hydrOXYzine (ATARAX) 25 MG tablet       Edema, may be related to an allergic response however none that is obvious. He did not try any medication since he did not have any at home. He would've and he would've used Benadryl. He has no shortness of breath he has no feeling or sensation of tightening in his throat.  n addition he is oral pharynx was clear and his lungs were clear to auscultation.  His neurologic exam was normal and he was showing no problems with mentation. Will use Atarax if need be or this region does not resolve or comes back we would use prednisone    He is encouraged to follow-up with his primary care within 1 week. Sooner if any shortness of breath tightness in his throat    Saleem Sotelo MD  [facility] URGENT CARE

## 2017-11-10 ENCOUNTER — OFFICE VISIT (OUTPATIENT)
Dept: INTERNAL MEDICINE | Facility: CLINIC | Age: 72
End: 2017-11-10
Payer: COMMERCIAL

## 2017-11-10 ENCOUNTER — DOCUMENTATION ONLY (OUTPATIENT)
Dept: VASCULAR SURGERY | Facility: CLINIC | Age: 72
End: 2017-11-10

## 2017-11-10 VITALS
TEMPERATURE: 98 F | WEIGHT: 209 LBS | HEART RATE: 55 BPM | DIASTOLIC BLOOD PRESSURE: 80 MMHG | OXYGEN SATURATION: 97 % | BODY MASS INDEX: 29.99 KG/M2 | SYSTOLIC BLOOD PRESSURE: 150 MMHG

## 2017-11-10 DIAGNOSIS — Z23 NEED FOR PROPHYLACTIC VACCINATION AND INOCULATION AGAINST INFLUENZA: ICD-10-CM

## 2017-11-10 DIAGNOSIS — E78.5 HYPERLIPIDEMIA LDL GOAL <100: ICD-10-CM

## 2017-11-10 DIAGNOSIS — Z00.00 MEDICARE ANNUAL WELLNESS VISIT, SUBSEQUENT: Primary | ICD-10-CM

## 2017-11-10 DIAGNOSIS — Z13.1 SCREENING FOR DIABETES MELLITUS: ICD-10-CM

## 2017-11-10 DIAGNOSIS — R73.9 HYPERGLYCEMIA: ICD-10-CM

## 2017-11-10 DIAGNOSIS — I10 ESSENTIAL HYPERTENSION, BENIGN: ICD-10-CM

## 2017-11-10 DIAGNOSIS — Z13.6 SCREENING FOR AAA (ABDOMINAL AORTIC ANEURYSM): ICD-10-CM

## 2017-11-10 DIAGNOSIS — R91.1 PULMONARY NODULE, LEFT: ICD-10-CM

## 2017-11-10 LAB
CHOLEST SERPL-MCNC: 161 MG/DL
GLUCOSE SERPL-MCNC: 98 MG/DL (ref 70–99)
HBA1C MFR BLD: 5.3 % (ref 4.3–6)
HDLC SERPL-MCNC: 64 MG/DL
LDLC SERPL CALC-MCNC: 75 MG/DL
NONHDLC SERPL-MCNC: 97 MG/DL
TRIGL SERPL-MCNC: 109 MG/DL

## 2017-11-10 PROCEDURE — 99397 PER PM REEVAL EST PAT 65+ YR: CPT | Mod: 25 | Performed by: INTERNAL MEDICINE

## 2017-11-10 PROCEDURE — 90662 IIV NO PRSV INCREASED AG IM: CPT | Performed by: INTERNAL MEDICINE

## 2017-11-10 PROCEDURE — 36415 COLL VENOUS BLD VENIPUNCTURE: CPT | Performed by: INTERNAL MEDICINE

## 2017-11-10 PROCEDURE — 83036 HEMOGLOBIN GLYCOSYLATED A1C: CPT | Performed by: INTERNAL MEDICINE

## 2017-11-10 PROCEDURE — G0008 ADMIN INFLUENZA VIRUS VAC: HCPCS | Performed by: INTERNAL MEDICINE

## 2017-11-10 PROCEDURE — 82947 ASSAY GLUCOSE BLOOD QUANT: CPT | Performed by: INTERNAL MEDICINE

## 2017-11-10 PROCEDURE — 80061 LIPID PANEL: CPT | Performed by: INTERNAL MEDICINE

## 2017-11-10 PROCEDURE — 99213 OFFICE O/P EST LOW 20 MIN: CPT | Mod: 25 | Performed by: INTERNAL MEDICINE

## 2017-11-10 RX ORDER — SIMVASTATIN 20 MG
20 TABLET ORAL AT BEDTIME
Qty: 90 TABLET | Refills: 3 | Status: SHIPPED | OUTPATIENT
Start: 2017-11-10 | End: 2018-12-11

## 2017-11-10 RX ORDER — LISINOPRIL 20 MG/1
20 TABLET ORAL DAILY
Qty: 90 TABLET | Refills: 3 | Status: SHIPPED | OUTPATIENT
Start: 2017-11-10 | End: 2018-12-03

## 2017-11-10 NOTE — MR AVS SNAPSHOT
After Visit Summary   11/10/2017    Guillermo Hurst    MRN: [id number]           Patient Information     Date Of Birth          1945        Visit Information        Provider Department      11/10/2017 9:00 AM Juliocesar Tuttle MD [facility]        Today's Diagnoses     Medicare annual wellness visit, subsequent    -  1    Pulmonary nodule, left        Hyperlipidemia LDL goal <100        Essential hypertension, benign        Screening for diabetes mellitus        Hyperglycemia           Follow-ups after your visit        Future tests that were ordered for you today     Open Future Orders        Priority Expected Expires Ordered    CT Chest w/o Contrast Routine  11/10/2018 11/10/2017            Who to contact     If you have questions or need follow up information about today's clinic visit or your schedule please contact [facility] directly at [phone].  Normal or non-critical lab and imaging results will be communicated to you by MyChart, letter or phone within 4 business days after the clinic has received the results. If you do not hear from us within 7 days, please contact the clinic through [employer] or phone. If you have a critical or abnormal lab result, we will notify you by phone as soon as possible.  Submit refill requests through [employer] or call your pharmacy and they will forward the refill request to us. Please allow 3 business days for your refill to be completed.          Additional Information About Your Visit        MyChart Information     [employer] gives you secure access to your electronic health record. If you see a primary care provider, you can also send messages to your care team and make appointments. If you have questions, please call your primary care clinic.  If you do not have a primary care provider, please call [phone] and they will assist you.        Care EveryWhere ID     This is your Care EveryWhere ID. This  could be used by other organizations to access your [facility] medical records  [id number]        Your Vitals Were     Pulse Temperature Pulse Oximetry BMI (Body Mass Index)          55 98  F (36.7  C) (Oral) 97% 29.99 kg/m2         Blood Pressure from Last 3 Encounters:   11/10/17 130/88   09/17/17 122/79   08/28/17 138/82    Weight from Last 3 Encounters:   11/10/17 209 lb (94.8 kg)   08/28/17 204 lb (92.5 kg)   08/24/17 206 lb 2 oz (93.5 kg)              We Performed the Following     Glucose     Hemoglobin A1c     Lipid panel reflex to direct LDL Fasting          Today's Medication Changes          These changes are accurate as of: 11/10/17  9:58 AM.  If you have any questions, ask your nurse or doctor.               These medicines have changed or have updated prescriptions.        Dose/Directions    aspirin 81 MG tablet   This may have changed:  See the new instructions.        1 tab po QD (Once per day)   Quantity:      Refills:                   Primary Care Provider Office Phone # Fax #    Juliocesar Tuttle [phone]       [address] [place] [phone]        Equal Access to Services     [facility] ROBB AH: Hadii lenny warner hadasho Sobalaali, waaxda luqadaha, qaybta kaalmada adechinyada, lissa ang. So [facility] [phone].    ATENCIÓN: Si habla español, tiene a tobar disposición servicios gratuitos de asistencia lingüística. Llame al [phone].    We comply with applicable federal civil rights laws and [place] laws. We do not discriminate on the basis of race, color, national origin, age, disability, sex, sexual orientation, or gender identity.            Thank you!     Thank you for choosing [facility]  for your care. Our goal is always to provide you with excellent care. Hearing back from our patients is one way we can continue to improve our services. Please take a few minutes to complete the written survey that you may receive in  the mail after your visit with us. Thank you!             Your Updated Medication List - Protect others around you: Learn how to safely use, store and throw away your medicines at www.disposemymeds.org.          This list is accurate as of: 11/10/17  9:58 AM.  Always use your most recent med list.                   Brand Name Dispense Instructions for use Diagnosis    aspirin 81 MG tablet          1 tab po QD (Once per day)        calcium carbonate 1250 MG tablet    OS-SUSIE 500 mg [place]. [place]     Take 1 tablet by mouth daily        CENTRUM SILVER ADULT 50+ Tabs      Take 1 tablet by mouth daily        lisinopril 20 MG tablet    PRINIVIL/ZESTRIL    90 tablet    Take 1 tablet (20 mg) by mouth daily    Essential hypertension, benign       magnesium 250 MG tablet      Take 1 tablet by mouth daily        simvastatin 20 MG tablet    ZOCOR    90 tablet    Take 1 tablet (20 mg) by mouth At Bedtime    Hyperlipidemia LDL goal <100

## 2017-11-10 NOTE — PROGRESS NOTES

## 2017-11-10 NOTE — PROGRESS NOTES
SUBJECTIVE:   Guillermo Hurst is a 72 year old male who presents for Preventive Visit.    Are you in the first 12 months of your Medicare coverage?  No    Physical   Annual:     Getting at least 3 servings of Calcium per day::  Yes    Bi-annual eye exam::  Yes    Dental care twice a year::  Yes    Sleep apnea or symptoms of sleep apnea::  None    Diet::  Regular (no restrictions)    Frequency of exercise::  4-5 days/week    Duration of exercise::  45-60 minutes    Taking medications regularly::  Yes    Medication side effects::  Muscle aches    Additional concerns today::  No      COGNITIVE SCREEN  1) Repeat 3 items (Banana, Sunrise, Chair)    2) Clock draw: NORMAL  3) 3 item recall: Recalls 3 objects  Results: 3 items recalled: COGNITIVE IMPAIRMENT LESS LIKELY    Mini-CogTM Copyright S Jessica. Licensed by the author for use in [facility] [employer]; reprinted with permission (everett@[email or web address]). All rights reserved.      Recently had Shave Bx--  BCC R pre-auricular area. Will be getting Mohs surgery.    Reviewed and updated as needed this visit by clinical staff  Tobacco  Allergies  Meds  Problems  Soc Hx        Reviewed and updated as needed this visit by Provider  Tobacco  Allergies  Meds  Problems  Soc Hx       Social History   Substance Use Topics     Smoking status: Former Smoker     Packs/day: 1.00     Years: 20.00     Types: Cigarettes     Quit date: 8/1/1985     Smokeless tobacco: Never Used     Alcohol use 0.0 oz/week     0 Standard drinks or equivalent per week      Comment: Avg (1.5 per day, wine, beer or cocktail)       The patient does not drink >3 drinks per day nor >7 drinks per week.      Today's PHQ-2 Score:   PHQ-2 ( 1999 [employer]) 11/7/2017   Q1: Little interest or pleasure in doing things 0   Q2: Feeling down, depressed or hopeless 0   PHQ-2 Score 0   Q1: Little interest or pleasure in doing things Not at all   Q2: Feeling down, depressed or hopeless Not at all   PHQ-2 Score 0       Do you feel  "safe in your environment - Yes    Do you have a Health Care Directive?: Yes: Advance Directive has been received and scanned.    Current providers sharing in care for this patient include:   Patient Care Team:  Juliocesar Tuttle MD as PCP - General      Hearing impairment: No    Ability to successfully perform activities of daily living: Yes, no assistance needed     Fall risk:  Fallen 2 or more times in the past year?: No  Any fall with injury in the past year?: No      Home safety:  none identified      The following health maintenance items are reviewed in Epic and correct as of today:  Health Maintenance   Topic Date Due     AORTIC ANEURYSM SCREENING (SYSTEM ASSIGNED)  08/31/2009     INFLUENZA VACCINE (SYSTEM ASSIGNED)  09/01/2017     LIPID MONITORING Q1 YEAR  11/15/2017     COLONOSCOPY Q10 YR  03/14/2018     BMP Q1 YR  08/06/2018     FALL RISK ASSESSMENT  08/10/2018     ADVANCE DIRECTIVE PLANNING Q5 YRS  06/28/2022     TETANUS IMMUNIZATION (SYSTEM ASSIGNED)  11/19/2023     PNEUMOCOCCAL  Completed     Labs reviewed in EPIC      Review of Systems  Constitutional, HEENT, cardiovascular, pulmonary, GI, , musculoskeletal, neuro, skin, endocrine and psych systems are negative, except as otherwise noted.      OBJECTIVE:   /80  Pulse 55  Temp 98  F (36.7  C) (Oral)  Wt 209 lb (94.8 kg)  SpO2 97%  BMI 29.99 kg/m2 Estimated body mass index is 29.99 kg/(m^2) as calculated from the following:    Height as of 8/28/17: 5' 10\" (1.778 m).    Weight as of this encounter: 209 lb (94.8 kg).  Physical Exam  GENERAL: healthy, alert and no distress  EYES: Eyes grossly normal to inspection, PERRL and conjunctivae and sclerae normal  HENT: ear canals and TM's normal, nose and mouth without ulcers or lesions  NECK: no adenopathy, no asymmetry, masses, or scars and thyroid normal to palpation  RESP: lungs clear to auscultation - no rales, rhonchi or wheezes  CV: regular rate and rhythm, normal S1 S2, no S3 or S4, no " "murmur, click or rub, no peripheral edema and peripheral pulses strong  ABDOMEN: soft, nontender, no hepatosplenomegaly, no masses and bowel sounds normal  MS: no gross musculoskeletal defects noted, no edema  SKIN: no suspicious lesions or rashes  NEURO: Normal strength and tone, mentation intact and speech normal  PSYCH: mentation appears normal, affect normal/bright    Results for orders placed or performed in visit on 11/10/17   Lipid panel reflex to direct LDL Fasting   Result Value Ref Range    Cholesterol 161 <200 mg/dL    Triglycerides 109 <150 mg/dL    HDL Cholesterol 64 >39 mg/dL    LDL Cholesterol Calculated 75 <100 mg/dL    Non HDL Cholesterol 97 <130 mg/dL   Glucose   Result Value Ref Range    Glucose 98 70 - 99 mg/dL   Hemoglobin A1c   Result Value Ref Range    Hemoglobin A1C 5.3 4.3 - 6.0 %      ASSESSMENT / PLAN:   1. Medicare annual wellness visit, subsequent  uptodate on screening. Overall in very good health     2. Pulmonary nodule, left  3 mo f/u  - CT Chest w/o Contrast; Future    3. Hyperlipidemia LDL goal <100  Excellent control- continue med  - Lipid panel reflex to direct LDL Fasting    4. Essential hypertension, benign  Elevated today- would like him to have this rechecked as outpt- if up- f/u for adjustment in meds    5. Screening for diabetes mellitus  - Glucose    7. Need for prophylactic vaccination and inoculation against influenza  - FLU VACCINE, INCREASED ANTIGEN, PRESV FREE, AGE 65+ [08584]  - ADMIN INFLUENZA (For MEDICARE Patients ONLY) []    End of Life Planning:  Patient currently has an advanced directive: Yes.  Practitioner is supportive of decision.    COUNSELING:  Reviewed preventive health counseling, as reflected in patient instructions        Estimated body mass index is 29.99 kg/(m^2) as calculated from the following:    Height as of 8/28/17: 5' 10\" (1.778 m).    Weight as of this encounter: 209 lb (94.8 kg).     reports that he quit smoking about 32 years ago. His " smoking use included Cigarettes. He has a 20.00 pack-year smoking history. He has never used smokeless tobacco.        Appropriate preventive services were discussed with this patient, including applicable screening as appropriate for cardiovascular disease, diabetes, osteopenia/osteoporosis, and glaucoma.  As appropriate for age/gender, discussed screening for colorectal cancer, prostate cancer, breast cancer, and cervical cancer. Checklist reviewing preventive services available has been given to the patient.    Reviewed patients plan of care and provided an AVS. The Basic Care Plan (routine screening as documented in Health Maintenance) for Guillermo meets the Care Plan requirement. This Care Plan has been established and reviewed with the Patient.  Counseling Resources:  ATP IV Guidelines  Pooled Cohorts Equation Calculator  Breast Cancer Risk Calculator  FRAX Risk Assessment  ICSI Preventive Guidelines  Dietary Guidelines for Americans, 2010  USDA's MyPlate  ASA Prophylaxis  Lung CA Screening    Juliocesar Tuttle MD  [facility] for HPI/ROS submitted by the patient on 11/7/2017   PHQ-2 Score: 0

## 2017-11-10 NOTE — NURSING NOTE
"Chief Complaint   Patient presents with     Physical       Initial /88  Pulse 55  Temp 98  F (36.7  C) (Oral)  Wt 209 lb (94.8 kg)  SpO2 97%  BMI 29.99 kg/m2 Estimated body mass index is 29.99 kg/(m^2) as calculated from the following:    Height as of 8/28/17: 5' 10\" (1.778 m).    Weight as of this encounter: 209 lb (94.8 kg).  Medication Reconciliation: complete    "

## 2017-11-15 ENCOUNTER — HOSPITAL ENCOUNTER (OUTPATIENT)
Dept: CT IMAGING | Facility: CLINIC | Age: 72
Discharge: HOME | End: 2017-11-15
Attending: INTERNAL MEDICINE | Admitting: INTERNAL MEDICINE
Payer: MEDICARE

## 2017-11-15 DIAGNOSIS — R91.1 PULMONARY NODULE, LEFT: ICD-10-CM

## 2017-11-15 PROCEDURE — 71250 CT THORAX DX C-: CPT

## 2018-02-22 ENCOUNTER — MYC MEDICAL ADVICE (OUTPATIENT)
Dept: INTERNAL MEDICINE | Facility: CLINIC | Age: 73
End: 2018-02-22

## 2018-02-22 DIAGNOSIS — R91.1 PULMONARY NODULE, LEFT: Primary | ICD-10-CM

## 2018-02-26 ENCOUNTER — HOSPITAL ENCOUNTER (OUTPATIENT)
Dept: CT IMAGING | Facility: CLINIC | Age: 73
Discharge: HOME | End: 2018-02-26
Attending: INTERNAL MEDICINE | Admitting: INTERNAL MEDICINE
Payer: MEDICARE

## 2018-02-26 DIAGNOSIS — R91.1 PULMONARY NODULE, LEFT: ICD-10-CM

## 2018-02-26 PROCEDURE — 71250 CT THORAX DX C-: CPT

## 2018-05-19 ENCOUNTER — HEALTH MAINTENANCE LETTER (OUTPATIENT)
Age: 73
End: 2018-05-19

## 2018-12-03 DIAGNOSIS — I10 ESSENTIAL HYPERTENSION, BENIGN: ICD-10-CM

## 2018-12-03 NOTE — TELEPHONE ENCOUNTER
"Requested Prescriptions   Pending Prescriptions Disp Refills     lisinopril (PRINIVIL/ZESTRIL) 20 MG tablet [Pharmacy Med Name: LISINOPRIL 20MG TABLETS] 90 tablet 0    Last Written Prescription Date:  11/10/17  Last Fill Quantity: 90,  # refills: 3   Last office visit: 11/10/2017 with prescribing provider:  11/10/17   Future Office Visit:  0 Sig: TAKE 1 TABLET(20 MG) BY MOUTH DAILY    ACE Inhibitors (Including Combos) Protocol Failed    12/3/2018  4:05 AM       Failed - Blood pressure under 140/90 in past 12 months    BP Readings from Last 3 Encounters:   11/10/17 150/80   09/17/17 122/79   08/28/17 138/82                Failed - Recent (12 mo) or future (30 days) visit within the authorizing provider's specialty    Patient had office visit in the last 12 months or has a visit in the next 30 days with authorizing provider or within the authorizing provider's specialty.  See \"Patient Info\" tab in inbasket, or \"Choose Columns\" in Meds & Orders section of the refill encounter.             Failed - Normal serum creatinine on file in past 12 months    Recent Labs   Lab Test  08/06/17   2330   CR  1.10            Failed - Normal serum potassium on file in past 12 months    Recent Labs   Lab Test  08/06/17   2330   POTASSIUM  4.0            Passed - Patient is age 18 or older        "

## 2018-12-03 NOTE — LETTER
[facility]  [address] [id number] [phone]            Guillermo Hurst  [address] [address] [facility] [address]        December 4, 2018    Dear Guillermo,    While refilling your prescription today, we noticed that you are due for an appointment with your provider.  We will refill your prescription for 30 days, but a follow-up appointment must be made before any additional refills can be approved.     Taking care of your health is important to us and we look forward to seeing you in the near future.  Please call us at [phone] or [phone] (or use [facility]) to schedule an appointment.     Please disregard this notice if you have already made an appointment.    Sincerely,        [facility]

## 2018-12-04 DIAGNOSIS — I10 ESSENTIAL HYPERTENSION, BENIGN: ICD-10-CM

## 2018-12-04 RX ORDER — LISINOPRIL 20 MG/1
TABLET ORAL
Qty: 30 TABLET | Refills: 0 | Status: SHIPPED | OUTPATIENT
Start: 2018-12-04 | End: 2018-12-11

## 2018-12-04 RX ORDER — LISINOPRIL 20 MG/1
TABLET ORAL
Qty: 90 TABLET | Refills: 0 | OUTPATIENT
Start: 2018-12-04

## 2018-12-04 NOTE — TELEPHONE ENCOUNTER
"Requested Prescriptions   Pending Prescriptions Disp Refills     lisinopril (PRINIVIL/ZESTRIL) 20 MG tablet [Pharmacy Med Name: LISINOPRIL 20MG TABLETS] 90 tablet 0     Sig: TAKE 1 TABLET BY MOUTH DAILY    ACE Inhibitors (Including Combos) Protocol Failed    12/4/2018 10:55 AM       Failed - Blood pressure under 140/90 in past 12 months    BP Readings from Last 3 Encounters:   11/10/17 150/80   09/17/17 122/79   08/28/17 138/82                Failed - Recent (12 mo) or future (30 days) visit within the authorizing provider's specialty    Patient had office visit in the last 12 months or has a visit in the next 30 days with authorizing provider or within the authorizing provider's specialty.  See \"Patient Info\" tab in inbasket, or \"Choose Columns\" in Meds & Orders section of the refill encounter.             Failed - Normal serum creatinine on file in past 12 months    Recent Labs   Lab Test  08/06/17   2330   CR  1.10            Failed - Normal serum potassium on file in past 12 months    Recent Labs   Lab Test  08/06/17   2330   POTASSIUM  4.0            Passed - Patient is age 18 or older        Last Written Prescription Date:  12/4/18  Last Fill Quantity: 30,  # refills: 0   Last office visit: 11/10/2017 with prescribing provider:  11/10/17   Future Office Visit:      "

## 2018-12-08 ASSESSMENT — ACTIVITIES OF DAILY LIVING (ADL): CURRENT_FUNCTION: NO ASSISTANCE NEEDED

## 2018-12-11 ENCOUNTER — OFFICE VISIT (OUTPATIENT)
Dept: INTERNAL MEDICINE | Facility: CLINIC | Age: 73
End: 2018-12-11
Payer: COMMERCIAL

## 2018-12-11 VITALS
DIASTOLIC BLOOD PRESSURE: 72 MMHG | HEART RATE: 55 BPM | HEIGHT: 72 IN | WEIGHT: 207.8 LBS | TEMPERATURE: 97.8 F | RESPIRATION RATE: 16 BRPM | OXYGEN SATURATION: 97 % | BODY MASS INDEX: 28.15 KG/M2 | SYSTOLIC BLOOD PRESSURE: 140 MMHG

## 2018-12-11 DIAGNOSIS — M25.511 BILATERAL SHOULDER PAIN, UNSPECIFIED CHRONICITY: ICD-10-CM

## 2018-12-11 DIAGNOSIS — R91.1 PULMONARY NODULE, LEFT: ICD-10-CM

## 2018-12-11 DIAGNOSIS — I10 ESSENTIAL HYPERTENSION, BENIGN: ICD-10-CM

## 2018-12-11 DIAGNOSIS — Z23 NEED FOR PROPHYLACTIC VACCINATION AND INOCULATION AGAINST INFLUENZA: ICD-10-CM

## 2018-12-11 DIAGNOSIS — Z12.11 SCREEN FOR COLON CANCER: ICD-10-CM

## 2018-12-11 DIAGNOSIS — M25.512 BILATERAL SHOULDER PAIN, UNSPECIFIED CHRONICITY: ICD-10-CM

## 2018-12-11 DIAGNOSIS — E78.5 HYPERLIPIDEMIA LDL GOAL <100: ICD-10-CM

## 2018-12-11 DIAGNOSIS — Z00.00 MEDICARE ANNUAL WELLNESS VISIT, SUBSEQUENT: Primary | ICD-10-CM

## 2018-12-11 LAB
ANION GAP SERPL CALCULATED.3IONS-SCNC: 5 MMOL/L (ref 3–14)
BUN SERPL-MCNC: 17 MG/DL (ref 7–30)
CALCIUM SERPL-MCNC: 9.4 MG/DL (ref 8.5–10.1)
CHLORIDE SERPL-SCNC: 105 MMOL/L (ref 94–109)
CHOLEST SERPL-MCNC: 177 MG/DL
CO2 SERPL-SCNC: 27 MMOL/L (ref 20–32)
CREAT SERPL-MCNC: 1.07 MG/DL (ref 0.66–1.25)
ERYTHROCYTE [SEDIMENTATION RATE] IN BLOOD BY WESTERGREN METHOD: 5 MM/H (ref 0–20)
GFR SERPL CREATININE-BSD FRML MDRD: 68 ML/MIN/1.7M2
GLUCOSE SERPL-MCNC: 101 MG/DL (ref 70–99)
HDLC SERPL-MCNC: 63 MG/DL
LDLC SERPL CALC-MCNC: 90 MG/DL
NONHDLC SERPL-MCNC: 114 MG/DL
POTASSIUM SERPL-SCNC: 4.4 MMOL/L (ref 3.4–5.3)
SODIUM SERPL-SCNC: 137 MMOL/L (ref 133–144)
TRIGL SERPL-MCNC: 122 MG/DL

## 2018-12-11 PROCEDURE — 99397 PER PM REEVAL EST PAT 65+ YR: CPT | Performed by: INTERNAL MEDICINE

## 2018-12-11 PROCEDURE — 80061 LIPID PANEL: CPT | Performed by: INTERNAL MEDICINE

## 2018-12-11 PROCEDURE — 85652 RBC SED RATE AUTOMATED: CPT | Performed by: INTERNAL MEDICINE

## 2018-12-11 PROCEDURE — 36415 COLL VENOUS BLD VENIPUNCTURE: CPT | Performed by: INTERNAL MEDICINE

## 2018-12-11 PROCEDURE — 80048 BASIC METABOLIC PNL TOTAL CA: CPT | Performed by: INTERNAL MEDICINE

## 2018-12-11 RX ORDER — SIMVASTATIN 20 MG
20 TABLET ORAL AT BEDTIME
Qty: 90 TABLET | Refills: 3 | Status: SHIPPED | OUTPATIENT
Start: 2018-12-11 | End: 2019-11-21

## 2018-12-11 RX ORDER — LISINOPRIL 20 MG/1
TABLET ORAL
Qty: 90 TABLET | Refills: 1 | Status: SHIPPED | OUTPATIENT
Start: 2018-12-11 | End: 2019-06-21

## 2018-12-11 ASSESSMENT — MIFFLIN-ST. JEOR: SCORE: 1712.63

## 2018-12-11 ASSESSMENT — ACTIVITIES OF DAILY LIVING (ADL): CURRENT_FUNCTION: NO ASSISTANCE NEEDED

## 2018-12-11 NOTE — PROGRESS NOTES
"SUBJECTIVE:   Guillermo Hurst is a 73 year old male who presents for Preventive Visit.      Are you in the first 12 months of your Medicare coverage?  No    Annual Wellness Visit     In general, how would you rate your overall health?  Excellent    Frequency of exercise:  4-5 days/week    Do you usually eat at least 4 servings of fruit and vegetables a day, include whole grains    & fiber and avoid regularly eating high fat or \"junk\" foods?  No    Taking medications regularly:  Yes    Medication side effects:  Muscle aches    Ability to successfully perform activities of daily living:  No assistance needed    Home Safety:  Lack of grab bars in the bathroom    Hearing Impairment:  No hearing concerns    In the past 6 months, have you been bothered by leaking of urine?  No    In general, how would you rate your overall mental or emotional health?  Excellent    PHQ-2 Total Score: 0    Additional concerns today:  No    Do you feel safe in your environment? Yes    Do you have a Health Care Directive? Yes: Advance Directive has been received and scanned.      Fall risk  Fallen 2 or more times in the past year?: No  Any fall with injury in the past year?: No    Cognitive Screening   1) Repeat 3 items (Leader, Season, Table)    2) Clock draw: NORMAL  3) 3 item recall: Recalls 2 objects   Results: NORMAL clock, 1-2 items recalled: COGNITIVE IMPAIRMENT LESS LIKELY    Mini-CogTM Copyright RUDY Lopez. Licensed by the author for use in [facility]; reprinted with permission (everett@.[facility]). All rights reserved.      Do you have sleep apnea, excessive snoring or daytime drowsiness?: no    Reviewed and updated as needed this visit by clinical staff  Tobacco  Allergies  Meds  Soc Hx        Reviewed and updated as needed this visit by Provider  Tobacco  Soc Hx       Social History     Tobacco Use     Smoking status: Former Smoker     Packs/day: 1.00     Years: 20.00     Pack years: 20.00     Types: Cigarettes     " "Last attempt to quit: 1985     Years since quittin.3     Smokeless tobacco: Never Used   Substance Use Topics     Alcohol use: Yes     Alcohol/week: 0.0 oz     Comment: Avg (1.5 per day, wine, beer or cocktail)       Alcohol Use 2018   If you drink alcohol do you typically have greater than 3 drinks per day OR greater than 7 drinks per week? No   No flowsheet data found.        Hyperlipidemia Follow-Up      Rate your low fat/cholesterol diet?: good    Taking statin?  Yes, no muscle aches from statin    Other lipid medications/supplements?:  none    Hypertension Follow-up      Outpatient blood pressures are being checked at home and most are in the 110-130 range systolically    Low Salt Diet: no added salt      Current providers sharing in care for this patient include:   Patient Care Team:  Juliocesar Tuttle MD as PCP - General    The following health maintenance items are reviewed in Epic and correct as of today:  Health Maintenance   Topic Date Due     ZOSTER IMMUNIZATION (1 of 2) 1994     COLONOSCOPY Q10 YR  2018     BMP Q1 YR  2018     INFLUENZA VACCINE (1) 2018     FALL RISK ASSESSMENT  11/10/2018     LIPID MONITORING Q1 YEAR  11/10/2018     PHQ-2 Q1 YR  2019     ADVANCE DIRECTIVE PLANNING Q5 YRS  2022     DTAP/TDAP/TD IMMUNIZATION (4 - Td) 2023     PNEUMOVAX IMMUNIZATION 65+ LOW/MEDIUM RISK  Completed     AORTIC ANEURYSM SCREENING (SYSTEM ASSIGNED)  Completed     IPV IMMUNIZATION  Aged Out     MENINGITIS IMMUNIZATION  Aged Out         Review of Systems  Constitutional, HEENT, cardiovascular, pulmonary, GI, , musculoskeletal, neuro, skin, endocrine and psych systems are negative, except as otherwise noted.    OBJECTIVE:   /72   Pulse 55   Temp 97.8  F (36.6  C) (Oral)   Resp 16   Ht 1.816 m (5' 11.5\")   Wt 94.3 kg (207 lb 12.8 oz)   SpO2 97%   BMI 28.58 kg/m   Estimated body mass index is 28.58 kg/m  as calculated from the following:    " "Height as of this encounter: 1.816 m (5' 11.5\").    Weight as of this encounter: 94.3 kg (207 lb 12.8 oz).  Physical Exam  GENERAL: healthy, alert and no distress  EYES: Eyes grossly normal to inspection, PERRL and conjunctivae and sclerae normal  HENT: ear canals and TM's normal, nose and mouth without ulcers or lesions  NECK: no adenopathy, no asymmetry, masses, or scars and thyroid normal to palpation  RESP: lungs clear to auscultation - no rales, rhonchi or wheezes  CV: regular rate and rhythm, normal S1 S2, no S3 or S4, no murmur, click or rub, no peripheral edema and peripheral pulses strong  ABDOMEN: soft, nontender, no hepatosplenomegaly, no masses and bowel sounds normal  MS: no gross musculoskeletal defects noted, no edema  SKIN: no suspicious lesions or rashes  NEURO: Normal strength and tone, mentation intact and speech normal  PSYCH: mentation appears normal, affect normal/bright  LYMPH: no cervical, supraclavicular, axillary, or inguinal adenopathy    Results for orders placed or performed in visit on 12/11/18 (from the past 24 hour(s))   BASIC METABOLIC PANEL   Result Value Ref Range    Sodium 137 133 - 144 mmol/L    Potassium 4.4 3.4 - 5.3 mmol/L    Chloride 105 94 - 109 mmol/L    Carbon Dioxide 27 20 - 32 mmol/L    Anion Gap 5 3 - 14 mmol/L    Glucose 101 (H) 70 - 99 mg/dL    Urea Nitrogen 17 7 - 30 mg/dL    Creatinine 1.07 0.66 - 1.25 mg/dL    GFR Estimate 68 >60 mL/min/1.7m2    GFR Estimate If Black 82 >60 mL/min/1.7m2    Calcium 9.4 8.5 - 10.1 mg/dL   Lipid panel reflex to direct LDL Fasting   Result Value Ref Range    Cholesterol 177 <200 mg/dL    Triglycerides 122 <150 mg/dL    HDL Cholesterol 63 >39 mg/dL    LDL Cholesterol Calculated 90 <100 mg/dL    Non HDL Cholesterol 114 <130 mg/dL   ESR: Erythrocyte sedimentation rate   Result Value Ref Range    Sed Rate 5 0 - 20 mm/h       ASSESSMENT / PLAN:   1. Medicare annual wellness visit, subsequent  Up-to-date on screening.  Overall appears in " "excellent health  Remains quite active.    2. Pulmonary nodule, left  Follow-up scan for pulmonary nodule  - CT Chest w/o Contrast; Future    3. Screen for colon cancer  Cologuard    4. Need for prophylactic vaccination and inoculation against influenza    5. Bilateral shoulder pain, unspecified chronicity  - ESR: Erythrocyte sedimentation rate    6. Essential hypertension, benign  Elevated today.  Home numbers look quite good.  Recheck here in the next several months  - BASIC METABOLIC PANEL  - lisinopril (PRINIVIL/ZESTRIL) 20 MG tablet; TAKE 1 TABLET(20 MG) BY MOUTH DAILY  Dispense: 90 tablet; Refill: 1    7. Hyperlipidemia LDL goal <100  - Lipid panel reflex to direct LDL Fasting  - simvastatin (ZOCOR) 20 MG tablet; Take 1 tablet (20 mg) by mouth At Bedtime  Dispense: 90 tablet; Refill: 3    End of Life Planning:  Patient currently has an advanced directive: Yes.  Practitioner is supportive of decision.    COUNSELING:  Reviewed preventive health counseling, as reflected in patient instructions    BP Readings from Last 1 Encounters:   12/11/18 140/72     Estimated body mass index is 28.58 kg/m  as calculated from the following:    Height as of this encounter: 1.816 m (5' 11.5\").    Weight as of this encounter: 94.3 kg (207 lb 12.8 oz).           reports that he quit smoking about 33 years ago. His smoking use included cigarettes. He has a 20.00 pack-year smoking history. he has never used smokeless tobacco.      Appropriate preventive services were discussed with this patient, including applicable screening as appropriate for cardiovascular disease, diabetes, osteopenia/osteoporosis, and glaucoma.  As appropriate for age/gender, discussed screening for colorectal cancer, prostate cancer, breast cancer, and cervical cancer. Checklist reviewing preventive services available has been given to the patient.    Reviewed patients plan of care and provided an AVS. The Basic Care Plan (routine screening as documented in " Health Maintenance) for Guillermo meets the Care Plan requirement. This Care Plan has been established and reviewed with the Patient.    Counseling Resources:  ATP IV Guidelines  Pooled Cohorts Equation Calculator  Breast Cancer Risk Calculator  FRAX Risk Assessment  ICSI Preventive Guidelines  Dietary Guidelines for Americans, 2010  USDA's MyPlate  ASA Prophylaxis  Lung CA Screening    Juliocesar Tuttle MD  [facility]

## 2018-12-11 NOTE — PATIENT INSTRUCTIONS
Preventive Health Recommendations:     See your health care provider every year to    Review health changes.     Discuss preventive care.      Review your medicines if your doctor has prescribed any.    Talk with your health care provider about whether you should have a test to screen for prostate cancer (PSA).    Every 3 years, have a diabetes test (fasting glucose). If you are at risk for diabetes, you should have this test more often.    Every 5 years, have a cholesterol test. Have this test more often if you are at risk for high cholesterol or heart disease.     Every 10 years, have a colonoscopy. Or, have a yearly FIT test (stool test). These exams will check for colon cancer.    Talk to with your health care provider about screening for Abdominal Aortic Aneurysm if you have a family history of AAA or have a history of smoking.  Shots:     Get a flu shot each year.     Get a tetanus shot every 10 years.     Talk to your doctor about your pneumonia vaccines. There are now two you should receive - Pneumovax (PPSV 23) and Prevnar (PCV 13).    Talk to your pharmacist about a shingles vaccine.     Talk to your doctor about the hepatitis B vaccine.  Nutrition:     Eat at least 5 servings of fruits and vegetables each day.     Eat whole-grain bread, whole-wheat pasta and brown rice instead of white grains and rice.     Get adequate Calcium and Vitamin D.   Lifestyle    Exercise for at least 150 minutes a week (30 minutes a day, 5 days a week). This will help you control your weight and prevent disease.     Limit alcohol to one drink per day.     No smoking.     Wear sunscreen to prevent skin cancer.     See your dentist every six months for an exam and cleaning.     See your eye doctor every 1 to 2 years to screen for conditions such as glaucoma, macular degeneration and cataracts.    Personalized Prevention Plan  You are due for the preventive services outlined below.  Your care team is available to assist you in  scheduling these services.  If you have already completed any of these items, please share that information with your care team to update in your medical record.    Health Maintenance Due   Topic Date Due     Zoster (Chicken Pox) Vaccine (1 of 2) 08/31/1994     Colonoscopy - ever 10 years  03/14/2018     Basic Metabolic Lab - yearly  08/06/2018     Flu Vaccine (1) 09/01/2018     FALL RISK ASSESSMENT  11/10/2018     Cholesterol Lab - yearly  11/10/2018

## 2019-01-02 ENCOUNTER — TRANSFERRED RECORDS (OUTPATIENT)
Dept: HEALTH INFORMATION MANAGEMENT | Facility: CLINIC | Age: 74
End: 2019-01-02

## 2019-01-02 LAB — COLOGUARD-ABSTRACT: POSITIVE

## 2019-01-08 ENCOUNTER — TELEPHONE (OUTPATIENT)
Dept: INTERNAL MEDICINE | Facility: CLINIC | Age: 74
End: 2019-01-08

## 2019-01-08 DIAGNOSIS — R19.5 POSITIVE COLORECTAL CANCER SCREENING USING DNA-BASED STOOL TEST: Primary | ICD-10-CM

## 2019-01-09 ENCOUNTER — MYC MEDICAL ADVICE (OUTPATIENT)
Dept: INTERNAL MEDICINE | Facility: CLINIC | Age: 74
End: 2019-01-09

## 2019-01-09 NOTE — TELEPHONE ENCOUNTER
Routing to PCP to verify--patient mentions that CT is not supposed to be done for another 9 months (technically 8 now).  Is this correct or would PCP like patient to have imaging done now?  Please advise.  Nurse can provide patient with number to [facility] once plan verified.

## 2019-01-10 NOTE — TELEPHONE ENCOUNTER
Informed patient of message below. Patient is ok with this. Patient will call in February if he needs help in getting this scheduled then.   Virginia Young CMA on 1/10/2019 at 3:06 PM

## 2019-01-24 ENCOUNTER — HOSPITAL ENCOUNTER (OUTPATIENT)
Facility: CLINIC | Age: 74
Discharge: HOME | End: 2019-01-24
Attending: INTERNAL MEDICINE | Admitting: INTERNAL MEDICINE
Payer: COMMERCIAL

## 2019-01-24 VITALS
OXYGEN SATURATION: 95 % | RESPIRATION RATE: 16 BRPM | DIASTOLIC BLOOD PRESSURE: 92 MMHG | BODY MASS INDEX: 27.22 KG/M2 | SYSTOLIC BLOOD PRESSURE: 110 MMHG | HEIGHT: 72 IN | WEIGHT: 201 LBS | HEART RATE: 61 BPM

## 2019-01-24 LAB — COLONOSCOPY: NORMAL

## 2019-01-24 PROCEDURE — 25000128 H RX IP 250 OP 636: Performed by: INTERNAL MEDICINE

## 2019-01-24 PROCEDURE — 45378 DIAGNOSTIC COLONOSCOPY: CPT | Performed by: INTERNAL MEDICINE

## 2019-01-24 PROCEDURE — G0500 MOD SEDAT ENDO SERVICE >5YRS: HCPCS | Performed by: INTERNAL MEDICINE

## 2019-01-24 PROCEDURE — G0121 COLON CA SCRN NOT HI RSK IND: HCPCS | Performed by: INTERNAL MEDICINE

## 2019-01-24 RX ORDER — NALOXONE HYDROCHLORIDE 0.4 MG/ML
.1-.4 INJECTION, SOLUTION INTRAMUSCULAR; INTRAVENOUS; SUBCUTANEOUS
Status: DISCONTINUED | OUTPATIENT
Start: 2019-01-24 | End: 2019-01-24 | Stop reason: HOSPADM

## 2019-01-24 RX ORDER — ONDANSETRON 2 MG/ML
4 INJECTION INTRAMUSCULAR; INTRAVENOUS
Status: DISCONTINUED | OUTPATIENT
Start: 2019-01-24 | End: 2019-01-24 | Stop reason: HOSPADM

## 2019-01-24 RX ORDER — ONDANSETRON 2 MG/ML
4 INJECTION INTRAMUSCULAR; INTRAVENOUS EVERY 6 HOURS PRN
Status: DISCONTINUED | OUTPATIENT
Start: 2019-01-24 | End: 2019-01-24 | Stop reason: HOSPADM

## 2019-01-24 RX ORDER — FENTANYL CITRATE 50 UG/ML
INJECTION, SOLUTION INTRAMUSCULAR; INTRAVENOUS PRN
Status: DISCONTINUED | OUTPATIENT
Start: 2019-01-24 | End: 2019-01-24 | Stop reason: HOSPADM

## 2019-01-24 RX ORDER — FLUMAZENIL 0.1 MG/ML
0.2 INJECTION, SOLUTION INTRAVENOUS
Status: DISCONTINUED | OUTPATIENT
Start: 2019-01-24 | End: 2019-01-24 | Stop reason: HOSPADM

## 2019-01-24 RX ORDER — ONDANSETRON 4 MG/1
4 TABLET, ORALLY DISINTEGRATING ORAL EVERY 6 HOURS PRN
Status: DISCONTINUED | OUTPATIENT
Start: 2019-01-24 | End: 2019-01-24 | Stop reason: HOSPADM

## 2019-01-24 RX ORDER — LIDOCAINE 40 MG/G
CREAM TOPICAL
Status: DISCONTINUED | OUTPATIENT
Start: 2019-01-24 | End: 2019-01-24 | Stop reason: HOSPADM

## 2019-01-24 ASSESSMENT — MIFFLIN-ST. JEOR: SCORE: 1689.73

## 2019-01-24 NOTE — LETTER
January 11, 2019      Guillermo Jese Hurst  [address] [facility] [address]        Thank you for choosing [facility]. You are scheduled for the following service.     Date:  1/24/2019 Thursday             Procedure:  COLONOSCOPY  Doctor:        Dr. Jese Cantu   Arrival Time:  7:30 AM  *Check in at Emergency/Endoscopy desk*  Procedure Time:  8:00 AM    Location:   [facility]        Endoscopy Department, First Floor (Enter through ER Doors) *        [address] [place], [place] [address]      [phone] or [phone] () to reschedule      MIRALAX -GATORADE  PREP  Colonoscopy is the most accurate test to detect colon polyps and colon cancer; and the only test where polyps can be removed. During this procedure, a doctor examines the lining of your large intestine and rectum through a flexible tube.     Transportation  Arrange for a ride for the day of your procedure with a responsible adult.  A taxi ride is not an option unless you are accompanied by a responsible adult. If you fail to arrange transportation with a responsible adult, your procedure will be cancelled and rescheduled.    Purchase the  following supplies at your local pharmacy:  - 2 (two) bisacodyl tablets: each tablet contains 5 mg.  (Dulcolax  laxative NOT Dulcolax  stool softener)   - 1 (one) 8.3 oz bottle of Polyethylene Glycol (PEG) 3350 Powder   (MiraLAX , Smooth LAX , ClearLAX  or equivalent)  - 64 oz Gatorade    Regular Gatorade, Gatorade G2 , Powerade , Powerade Zero  or Pedialyte  is acceptable. Red colored flavors are not allowed; all other colors (yellow, green, orange, purple and blue) are okay. It is also okay to buy two 2.12 oz packets of powdered Gatorade that can be mixed with water to a total volume of 64 oz of liquid.  - 1 (one) 10 oz bottle of Magnesium Citrate (Red colored flavors are not allowed)  It is also okay for you to use a 0.5 oz package of powdered  magnesium citrate (17 g) mixed with 10 oz of water.    PREPARATION FOR COLONOSCOPY    7 days before:    Discontinue fiber supplements and medications containing iron. This includes Metamucil  and Fibercon ; and multivitamins with iron.  3 days before:    Begin a low-fiber diet. A low-fiber diet helps making the cleanout more effective.     Examples of a low-fiber diet include (but are not limited to): white bread, white rice, pasta, crackers, fish, chicken, eggs, ground beef, creamy peanut butter, cooked/steamed/boiled vegetables, canned fruit, bananas, melons, milk, plain yogurt cheese, salad dressing and other condiments.     The following are not allowed on a low-fiber diet: seeds, nuts, popcorn, bran, whole wheat, corn, quinoa, raw fruits and vegetables, berries and dried fruit, beans and lentils.    For additional details on low-fiber diet, please refer to the table on the last page.  2 days before:    Continue the low-fiber diet.     Drink at least 8 glasses of water throughout the day.     Stop eating solid foods at 11:45 pm.  1 day before:    In the morning: begin a clear liquid diet (liquids you can see through).     Examples of a clear liquid diet include: water, clear broth or bouillon, Gatorade, Pedialyte or Powerade, carbonated and non-carbonated soft drinks (Sprite , 7-Up , ginger ale), strained fruit juices without pulp (apple, white grape, white cranberry), Jell-O  and popsicles.     The following are not allowed on a clear liquid diet: red liquids, alcoholic beverages, dairy products (milk, creamer, and yogurt), protein shakes, creamy broths, juice with pulp and chewing tobacco.    At noon: take 2 (two) bisacodyl tablets     At 4 (and no later than 6pm): start drinking the Miralax-Gatorade preparation (8.3 oz of Miralax mixed with 64 oz of Gatorade in a large pitcher). Drink 1(one) 8 oz glass every 15 minutes thereafter, until the mixture is gone.    COLON CLEANSING TIPS: drink adequate amounts of  fluids before and after your colon cleansing to prevent dehydration. Stay near a toilet because you will have diarrhea. Even if you are sitting on the toilet, continue to drink the cleansing solution every 15 minutes. If you feel nauseous or vomit, rinse your mouth with water, take a 15 to 30-minute-break and then continue drinking the solution. You will be uncomfortable until the stool has flushed from your colon (in about 2 to 4 hours). You may feel chilled.      Day of your procedure  You may take all of your morning medications including blood pressure medications, blood thinners (if you have not been instructed to stop these by our office), methadone, anti-seizure medications with sips of water 3 hours prior to your procedure or earlier. Do not take insulin or vitamins prior to your procedure. Continue the clear liquid diet.   4 hours prior: drink 10 oz of magnesium citrate. It may be easier to drink it with a straw.    STOP consuming all liquids after that.     Do not take anything by mouth during this time.     Allow extra time to travel to your procedure as you may need to stop and use a restroom along the way.  You are ready for the procedure, if you followed all instructions and your stool is no longer formed, but clear or yellow liquid. If you are unsure whether your colon is clean, please call our office at [phone] before you leave for your appointment.  Bring the following to your procedure:  - Insurance Card/Photo ID.   - List of current medications including over-the-counter medications and supplements.   - Your rescue inhaler if you currently use one to control asthma.      Canceling or rescheduling your appointment:   If you must cancel or reschedule your appointment, please call [phone] as soon as possible.      COLONOSCOPY PRE-PROCEDURE CHECKLIST  If you have diabetes, ask your regular doctor for diet and medication restrictions.  If you take an anticoagulant or anti-platelet medication  (such as Coumadin , Lovenox , Pradaxa , Xarelto , Eliquis , etc.), please call your primary doctor for advice on holding this medication.  If you take aspirin you may continue to do so.  If you are or may be pregnant, please discuss the risks and benefits of this procedure with your doctor.          What happens during a colonoscopy?    Plan to spend up to two hours, starting at registration time, at the endoscopy center the day of your procedure. The colonoscopy takes an average of 15 to 30 minutes. Recovery time is about 30 minutes.    Before the exam:    You will change into a gown.    Your medical history and medication list will be reviewed with you, unless that has been done over the phone prior to the procedure.     A nurse will insert an intravenous (IV) line into your hand or arm.    The doctor will meet with you and will give you a consent form to sign.    During the exam:     Medicine will be given through the IV line to help you relax.     Your heart rate and oxygen levels will be monitored. If your blood pressure is low, you may be given fluids through the IV line.     The doctor will insert a flexible hollow tube, called a colonoscope, into your rectum. The scope will be advanced slowly through the large intestine (colon).    You may have a feeling of fullness or pressure.     If an abnormal tissue or a polyp is found, the doctor may remove it through the endoscope for closer examination, or biopsy. Tissue removal is painless    After the exam:           Any tissue samples removed during the exam will be sent to a lab for evaluation. It may take 5-7 working days for you to be notified of the results.     A nurse will provide you with complete discharge instructions before you leave the endoscopy center. Be sure to ask the nurse for specific instructions if you take blood thinners such as Aspirin, Coumadin or Plavix.     The doctor will prepare a full report for you and for the physician who referred  you for the procedure.     Your doctor will talk with you about the initial results of your exam.      Medication given during the exam will prohibit you from driving for the rest of the day.     Following the exam, you may resume your normal diet. Your first meal should be light, no greasy foods. Avoid alcohol until the next day.     You may resume your regular activities the day after the procedure.     LOW-FIBER DIET    Foods RECOMMENDED Foods to AVOID   Breads, Cereal, Rice and Pasta:   White bread, rolls, biscuits, croissant and nicola toast.   Waffles, Spanish toast and pancakes.   White rice, noodles, pasta, macaroni and peeled cooked potatoes.   Plain crackers and saltines.   Cooked cereals: farina, cream of rice.   Cold cereals: Puffed Rice , Rice Krispies , Corn Flakes  and Special K    Breads, Cereal, Rice and Pasta:   Breads or rolls with nuts, seeds or fruit.   Whole wheat, pumpernickel, rye breads and cornbread.   Potatoes with skin, brown or wild rice, and kasha (buckwheat).     Vegetables:   Tender cooked and canned vegetables without seeds: carrots, asparagus tips, green or wax beans, pumpkin, spinach, lima beans. Vegetables:   Raw or steamed vegetables.   Vegetables with seeds.   Sauerkraut.   Winter squash, peas, broccoli, Brussel sprouts, cabbage, onions, cauliflower, baked beans, peas and corn.   Fruits:   Strained fruit juice.   Canned fruit, except pineapple.   Ripe bananas and melon. Fruits:   Prunes and prune juice.   Raw fruits.   Dried fruits: figs, dates and raisins.   Milk/Dairy:   Milk: plain or flavored.   Yogurt, custard and ice cream.   Cheese and cottage cheese Milk/Dairy:     Meat and other proteins:   ground, well-cooked tender beef, lamb, ham, veal, pork, fish, poultry and organ meats.   Eggs.   Peanut butter without nuts. Meat and other proteins:   Tough, fibrous meats with gristle.   Dry beans, peas and lentils.   Peanut butter with nuts.   Tofu.   Fats, Snack, Sweets, Condiments  and Beverages:   Margarine, butter, oils, mayonnaise, sour cream and salad dressing, plain gravy.   Sugar, hard candy, clear jelly, honey and syrup.   Spices, cooked herbs, bouillon, broth and soups made with allowed vegetable, ketchup and mustard.   Coffee, tea and carbonated drinks.   Plain cakes, cookies and pretzels.   Gelatin, plain puddings, custard, ice cream, sherbet and popsicles. Fats, Snack, Sweets, Condiments and Beverages:   Nuts, seeds and coconut.   Jam, marmalade and preserves.   Pickles, olives, relish and horseradish.   All desserts containing nuts, seeds, dried fruit and coconut; or made from whole grains or bran.   Candy made with nuts or seeds.   Popcorn.                     DIRECTIONS TO THE ENDOSCOPY DEPARTMENT     From the north ([facility])  Take [address] South, exit on [address] [address]. Get into the left hand jonatan, turn left (east), go one-half mile to [address] and turn left. Go north to the first stoplight, take a right on [address] and follow it to the Emergency entrance.    From the south ([facility])  Take [address]N to the [address] split and exit on [address] [address]. On [address] [address], turn left (west) to [address]. Turn right (north) on [address]. Go north to the first stoplight, take a right on [address] and follow it to the Emergency entrance.    From the east via 35E ([facility])  Take [address] south to [address] [address] exit. Turn right on [address] . Go west to [address]. Turn right (north) on [address]. Go to the first stoplight, take a right and follow on [address] to the Emergency entrance.    From the east via [address] ([facility])  Take [address] to [address]. Turn left (south) on [address] to [address]. Turn left (east) on [address] and follow it to the Emergency entrance.    From the west via [address] ([place], [place])  Take [address] east to [address]. Turn right  (south) on [address] to [address]. Turn left (east) on [address] and follow it to the Emergency entrance.            ,

## 2019-01-24 NOTE — H&P
Pre-Endoscopy History and Physical     Guillermo Hurst MRN# [id number]   YOB: 1945 Age: 73 year old     Date of Procedure: 1/24/2019  Primary care provider: Juliocesar Tuttle  Type of Endoscopy: Colonoscopy with possible biopsy, possible polypectomy  Reason for Procedure: screen  Type of Anesthesia Anticipated: Conscious Sedation    HPI:    Guillermo is a 73 year old male who will be undergoing the above procedure.      A history and physical has been performed. The patient's medications and allergies have been reviewed. The risks and benefits of the procedure and the sedation options and risks were discussed with the patient.  All questions were answered and informed consent was obtained.      He denies a personal or family history of anesthesia complications or bleeding disorders.     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, with unspecified pathological lesion in kidney      Vertigo     has had multiple ear surgeries- told due to this by ENT     VIRAL PERICARDITIS 1975    resolved        Past Surgical History:   Procedure Laterality Date     ARTHROSCOPY KNEE Right 8/28/2017    Procedure: ARTHROSCOPY KNEE;  Arthroscopic partial medial meniscectomy, right knee;  Surgeon: Russell Crawford MD;  Location: [facility]     C BORGES W/O FACETEC FORAMOT/DSKC 1/2 VRT SEG, LUMBAR  1975    laminectomy     C BORGES W/O FACETEC FORAMOT/DSKC 1/2 VRT SEG, LUMBAR  1985    laminectomy     C NONSPECIFIC PROCEDURE  1983    R ear surgery due to recurrent infections     C NONSPECIFIC PROCEDURE  2002    LAMINECTOMY AND SPINAL FUSION ,  FLEXIBLE STRUT , 3 LEVELS     COLONOSCOPY       ENT SURGERY      Both ears       Social History     Tobacco Use     Smoking status: Former Smoker     Packs/day: 1.00     Years: 20.00     Pack years: 20.00     Types: Cigarettes     Last attempt to quit: 1985     Years since quittin.5     Smokeless tobacco: Never Used   Substance Use Topics     Alcohol use: Yes     Alcohol/week: 0.0 oz     Comment: Avg (1.5 per day, wine, beer or cocktail)       Family History   Problem Relation Age of Onset     Cardiovascular Brother         b:1928  hx bypass, HTN     C.A.D. Brother      Coronary Artery Disease Brother      Hypertension Brother      Respiratory Sister         b:193  asthma and allergies     Diabetes Sister      Cardiovascular Sister         pacemaker and bypass surgery     Coronary Artery Disease Sister      Hypertension Sister      Asthma Sister      Circulatory Sister         b:193 Raynaud's phenomenon     Cancer Brother         d:age 55  hx esophogeal then liver cancer.     Alcohol/Drug Brother         alcoholic - sober at time of death     Obesity Brother      C.A.D. Brother      Hypertension Brother      Chemical Addiction Brother      Cancer Brother         brain cancer     C.A.D. Brother      Family History Negative Sister         b:     Circulatory Mother         b:1908  hx of L leg amputation due to circulatory problems in , HTN     Hypertension Mother      Cardiovascular Father         d:age 53 after second MI     Alcohol/Drug Father         alcoholic, sober 5 yrs prior to death     Coronary Artery Disease Father              Cancer Paternal Grandmother         lung cancer       Prior to Admission medications    Medication Sig Start Date End Date Taking? Authorizing Provider   lisinopril (PRINIVIL/ZESTRIL) 20 MG tablet TAKE 1 TABLET(20 MG) BY MOUTH DAILY 18  Yes Juliocesar Tuttle MD   Multiple Vitamins-Minerals (CENTRUM SILVER ADULT 50+) TABS Take 1 tablet by mouth daily    "Yes Reported, Patient   simvastatin (ZOCOR) 20 MG tablet Take 1 tablet (20 mg) by mouth At Bedtime 12/11/18  Yes Juliocesar Tuttle MD       Allergies   Allergen Reactions     Iodine      \"puffed up\"     Penicillins      rash        REVIEW OF SYSTEMS:   5 point ROS negative except as noted above in HPI, including Gen., Resp., CV, GI &  system review.    PHYSICAL EXAM:   Ht 1.829 m (6')   Wt 91.2 kg (201 lb)   BMI 27.26 kg/m   Estimated body mass index is 27.26 kg/m  as calculated from the following:    Height as of this encounter: 1.829 m (6').    Weight as of this encounter: 91.2 kg (201 lb).   GENERAL APPEARANCE: alert, and oriented  MENTAL STATUS: alert  AIRWAY EXAM: Mallampatti Class I (visualization of the soft palate, fauces, uvula, anterior and posterior pillars)  RESP: lungs clear to auscultation - no rales, rhonchi or wheezes  CV: regular rates and rhythm  DIAGNOSTICS:    Not indicated    IMPRESSION   ASA Class 2 - Mild systemic disease    PLAN:   Plan for Colonoscopy with possible biopsy, possible polypectomy. We discussed the risks, benefits and alternatives and the patient wished to proceed.    The above has been forwarded to the consulting provider.      Signed Electronically by: Jese Cantu  January 24, 2019          "

## 2019-02-12 ENCOUNTER — HOSPITAL ENCOUNTER (OUTPATIENT)
Dept: CT IMAGING | Facility: CLINIC | Age: 74
Discharge: HOME | End: 2019-02-12
Attending: INTERNAL MEDICINE | Admitting: INTERNAL MEDICINE
Payer: COMMERCIAL

## 2019-02-12 DIAGNOSIS — R91.1 PULMONARY NODULE, LEFT: ICD-10-CM

## 2019-02-12 PROCEDURE — 71250 CT THORAX DX C-: CPT

## 2019-02-19 ENCOUNTER — MYC MEDICAL ADVICE (OUTPATIENT)
Dept: INTERNAL MEDICINE | Facility: CLINIC | Age: 74
End: 2019-02-19

## 2019-06-21 DIAGNOSIS — I10 ESSENTIAL HYPERTENSION, BENIGN: ICD-10-CM

## 2019-06-21 RX ORDER — LISINOPRIL 20 MG/1
TABLET ORAL
Qty: 90 TABLET | Refills: 0 | Status: SHIPPED | OUTPATIENT
Start: 2019-06-21 | End: 2019-09-09

## 2019-06-21 NOTE — TELEPHONE ENCOUNTER
"Requested Prescriptions   Pending Prescriptions Disp Refills     lisinopril (PRINIVIL/ZESTRIL) 20 MG tablet [Pharmacy Med Name: LISINOPRIL 20MG TABLETS] 90 tablet 0     Sig: TAKE 1 TABLET(20 MG) BY MOUTH DAILY   Last Written Prescription Date:  12/11/2018  Last Fill Quantity: 90,  # refills: 1   Last Office Visit: 12/11/2018   Future Office Visit:         ACE Inhibitors (Including Combos) Protocol Failed - 6/21/2019  4:05 AM        Failed - Blood pressure under 140/90 in past 12 months     BP Readings from Last 3 Encounters:   01/24/19 (!) 110/92   12/11/18 140/72   11/10/17 150/80                 Passed - Recent (12 mo) or future (30 days) visit within the authorizing provider's specialty     Patient had office visit in the last 12 months or has a visit in the next 30 days with authorizing provider or within the authorizing provider's specialty.  See \"Patient Info\" tab in inbasket, or \"Choose Columns\" in Meds & Orders section of the refill encounter.              Passed - Medication is active on med list        Passed - Patient is age 18 or older        Passed - Normal serum creatinine on file in past 12 months     Recent Labs   Lab Test 12/11/18  1018   CR 1.07             Passed - Normal serum potassium on file in past 12 months     Recent Labs   Lab Test 12/11/18  1018   POTASSIUM 4.4               "

## 2019-09-03 ENCOUNTER — MYC MEDICAL ADVICE (OUTPATIENT)
Dept: INTERNAL MEDICINE | Facility: CLINIC | Age: 74
End: 2019-09-03

## 2019-09-03 DIAGNOSIS — R29.898 MISALIGNMENT OF BOTH HIPS: Primary | ICD-10-CM

## 2019-09-04 NOTE — TELEPHONE ENCOUNTER
I'm not sure is AFO is appropriate or maybe shoe lift ? . Would start with seeing sports medicine/ortho

## 2019-09-09 ENCOUNTER — OFFICE VISIT (OUTPATIENT)
Dept: ORTHOPEDICS | Facility: CLINIC | Age: 74
End: 2019-09-09
Attending: INTERNAL MEDICINE
Payer: COMMERCIAL

## 2019-09-09 VITALS
WEIGHT: 214 LBS | SYSTOLIC BLOOD PRESSURE: 140 MMHG | HEIGHT: 72 IN | BODY MASS INDEX: 28.99 KG/M2 | DIASTOLIC BLOOD PRESSURE: 82 MMHG

## 2019-09-09 DIAGNOSIS — Z98.890 HISTORY OF LUMBAR SURGERY: ICD-10-CM

## 2019-09-09 DIAGNOSIS — R29.898 WEAKNESS OF RIGHT FOOT: ICD-10-CM

## 2019-09-09 DIAGNOSIS — M16.11 PRIMARY OSTEOARTHRITIS OF RIGHT HIP: Primary | ICD-10-CM

## 2019-09-09 DIAGNOSIS — I10 ESSENTIAL HYPERTENSION, BENIGN: ICD-10-CM

## 2019-09-09 DIAGNOSIS — M62.561 RIGHT CALF ATROPHY: ICD-10-CM

## 2019-09-09 PROCEDURE — 99203 OFFICE O/P NEW LOW 30 MIN: CPT | Performed by: FAMILY MEDICINE

## 2019-09-09 ASSESSMENT — MIFFLIN-ST. JEOR: SCORE: 1743.7

## 2019-09-09 NOTE — PROGRESS NOTES
ASSESSMENT & PLAN    1. Primary osteoarthritis of right hip    2. History of lumbar surgery    3. Weakness of right foot    4. Right calf atrophy      Seen in consultation for right hip pain, chronic and atraumatic.  Patient is more concerned about what he perceives as a foot drop and is requesting an AFO  Very active male and desires to continue his regular walking regiment   Okay for foot orthosis but encouraged him to continue with his calf strengthening to at least maintain his current power  Would benefit from at least a diagnostic/therapeutic intra-articular hip injection patient does not desire to pursue today  If hip pain is still limiting after using the brace can consider a cortisone injection    Follow-up if pain / stability are not improved with the brace  -----    SUBJECTIVE  Guillermo Hurst is a/an 74 year old male who is seen in consultation at the request of  Juliocesar Tuttle M.D. for evaluation of right hip pain. The patient is seen by themselves.    Onset: 3 years(s) ago. Reports insidious onset without acute precipitating event.  Location of Pain: right lateral hip  Rating of Pain at worst: 6/10  Rating of Pain Currently: 1/10  Worsened by: prolonged walking, laying on left or right side  Better with: aleve (uses 1 x every 2 months)  Treatments tried: rest/activity avoidance, Aleve and previous imaging (xray 6/28/17), home exercises, drop foot brace for right foot (tried 18 years ago)  Quality: aching  Associated symptoms: no distal numbness or tingling; denies swelling or warmth  Orthopedic history: YES - patient reports h/o chronic low back pain  Relevant surgical history: YES - patient reports h/o low back surgery x 3 since he was 30 y.o., h/o drop foot on right side  Patient Social History: retired    Patient's past medical, surgical, social, and family histories were reviewed today and no pertinent history related to patient's presenting problem.    REVIEW OF SYSTEMS:  10 point ROS is  negative other than symptoms noted above in HPI, Past Medical History or as stated below  Constitutional: NEGATIVE for fever, chills, change in weight  Skin: NEGATIVE for worrisome rashes, moles or lesions  GI/: NEGATIVE for bowel or bladder changes  Neuro: NEGATIVE for weakness, dizziness or paresthesias    OBJECTIVE:  BP (!) 140/82   Ht 1.829 m (6')   Wt 97.1 kg (214 lb)   BMI 29.02 kg/m     General: healthy, alert and in no distress  HEENT: no scleral icterus or conjunctival erythema  Skin: no suspicious lesions or rash. No jaundice.  CV: no pedal edema, cool right lower extremity compared to the contralateral side  Resp: normal respiratory effort without conversational dyspnea   Psych: normal mood and affect  Gait: Antalgic gait, fair coordination and balance  Neuro: Decreased sensation over the right lateral calf, otherwise normal light sensory exam of lower extremity  MSK:  RIGHT HIP  Inspection:    No obvious deformity or asymmetry, Trendelenburg gait  Active Range of Motion:     Flexion full, IR limited by pain  Strength:    Flexion 5/5  Special Tests:    Positive: anterior impingement (FADIR)    THORACIC/LUMBAR SPINE  Inspection:     No gross deformity/asymmetry, Trendelenburg gait  Palpation:  Strength:    quadriceps 5/5, hamstrings 5/5, gastrocsoleus 4/5, tibialis anterior 5-/5    Independent visualization of the below image:  PELVIS AND HIP RIGHT TWO VIEWS  6/28/2017 9:54 AM      HISTORY: Pain in right hip.     COMPARISON: None.                                                                      IMPRESSION: Joint space narrowing in the right hip joint space with  minimal osteophyte formation corresponding to mild osteoarthritis.  Larger osteophytes are noted surrounding the left hip joint space  corresponding to mild-to-moderate left osteoarthritis. No acute  fracture. Rounded metallic density overlies L4-L5 disc space, thought  to be a surgical placed disc spacer.     MD Cole HARPER  DO [employer] JoeM  [facility]

## 2019-09-09 NOTE — LETTER
9/9/2019         RE: Guillermo Hurst  [address] [facility] [id number]        Dear Colleague,    Thank you for referring your patient, Guillermo Hurst, to the [facility]. Please see a copy of my visit note below.      ASSESSMENT & PLAN    1. Primary osteoarthritis of right hip    2. History of lumbar surgery    3. Weakness of right foot    4. Right calf atrophy      Seen in consultation for right hip pain, chronic and atraumatic.  Patient is more concerned about what he perceives as a foot drop and is requesting an AFO  Very active male and desires to continue his regular walking regiment   Okay for foot orthosis but encouraged him to continue with his calf strengthening to at least maintain his current power  Would benefit from at least a diagnostic/therapeutic intra-articular hip injection patient does not desire to pursue today  If hip pain is still limiting after using the brace can consider a cortisone injection    Follow-up if pain / stability are not improved with the brace  -----    SUBJECTIVE  Guillermo Hurst is a/an 74 year old male who is seen in consultation at the request of  Juliocesar Tuttle M.D. for evaluation of right hip pain. The patient is seen by themselves.    Onset: 3 years(s) ago. Reports insidious onset without acute precipitating event.  Location of Pain: right lateral hip  Rating of Pain at worst: 6/10  Rating of Pain Currently: 1/10  Worsened by: prolonged walking, laying on left or right side  Better with: aleve (uses 1 x every 2 months)  Treatments tried: rest/activity avoidance, Aleve and previous imaging (xray 6/28/17), home exercises, drop foot brace for right foot (tried 18 years ago)  Quality: aching  Associated symptoms: no distal numbness or tingling; denies swelling or warmth  Orthopedic history: YES - patient reports h/o chronic low back pain  Relevant surgical history: YES - patient reports h/o low back surgery x 3 since he was 30  y.o., h/o drop foot on right side  Patient Social History: retired    Patient's past medical, surgical, social, and family histories were reviewed today and no pertinent history related to patient's presenting problem.    REVIEW OF SYSTEMS:  10 point ROS is negative other than symptoms noted above in HPI, Past Medical History or as stated below  Constitutional: NEGATIVE for fever, chills, change in weight  Skin: NEGATIVE for worrisome rashes, moles or lesions  GI/: NEGATIVE for bowel or bladder changes  Neuro: NEGATIVE for weakness, dizziness or paresthesias    OBJECTIVE:  BP (!) 140/82   Ht 1.829 m (6')   Wt 97.1 kg (214 lb)   BMI 29.02 kg/m      General: healthy, alert and in no distress  HEENT: no scleral icterus or conjunctival erythema  Skin: no suspicious lesions or rash. No jaundice.  CV: no pedal edema, cool right lower extremity compared to the contralateral side  Resp: normal respiratory effort without conversational dyspnea   Psych: normal mood and affect  Gait: Antalgic gait, fair coordination and balance  Neuro: Decreased sensation over the right lateral calf, otherwise normal light sensory exam of lower extremity  MSK:  RIGHT HIP  Inspection:    No obvious deformity or asymmetry, Trendelenburg gait  Active Range of Motion:     Flexion full, IR limited by pain  Strength:    Flexion 5/5  Special Tests:    Positive: anterior impingement (FADIR)    THORACIC/LUMBAR SPINE  Inspection:     No gross deformity/asymmetry, Trendelenburg gait  Palpation:  Strength:    quadriceps 5/5, hamstrings 5/5, gastrocsoleus 4/5, tibialis anterior 5-/5    Independent visualization of the below image:  PELVIS AND HIP RIGHT TWO VIEWS  6/28/2017 9:54 AM      HISTORY: Pain in right hip.     COMPARISON: None.                                                                      IMPRESSION: Joint space narrowing in the right hip joint space with  minimal osteophyte formation corresponding to mild osteoarthritis.  Larger  osteophytes are noted surrounding the left hip joint space  corresponding to mild-to-moderate left osteoarthritis. No acute  fracture. Rounded metallic density overlies L4-L5 disc space, thought  to be a surgical placed disc spacer.     MD Cole HARPER, DO [facility]      Again, thank you for allowing me to participate in the care of your patient.        Sincerely,        Cole Rodriguez, DO

## 2019-09-09 NOTE — PATIENT INSTRUCTIONS
1. Primary osteoarthritis of right hip    2. History of lumbar surgery    3. Weakness of right foot    4. Right calf atrophy      Continue to stay active  Use foot brace as needed. Be sure you're continuing to work on your calf strength  If hip pain is still limiting after using the brace can consider a cortisone injection    Follow-up if pain / stability are not improved with the brace

## 2019-09-10 RX ORDER — LISINOPRIL 20 MG/1
TABLET ORAL
Qty: 90 TABLET | Refills: 1 | Status: SHIPPED | OUTPATIENT
Start: 2019-09-10 | End: 2019-11-21

## 2019-09-10 NOTE — TELEPHONE ENCOUNTER
"Requested Prescriptions   Pending Prescriptions Disp Refills     lisinopril (PRINIVIL/ZESTRIL) 20 MG tablet [Pharmacy Med Name: LISINOPRIL 20MG TABLETS] 90 tablet 0     Sig: TAKE 1 TABLET(20 MG) BY MOUTH DAILY       ACE Inhibitors (Including Combos) Protocol Failed - 9/9/2019 11:50 AM        Failed - Blood pressure under 140/90 in past 12 months     BP Readings from Last 3 Encounters:   09/09/19 (!) 140/82   01/24/19 (!) 110/92   12/11/18 140/72                 Passed - Recent (12 mo) or future (30 days) visit within the authorizing provider's specialty     Patient had office visit in the last 12 months or has a visit in the next 30 days with authorizing provider or within the authorizing provider's specialty.  See \"Patient Info\" tab in inbasket, or \"Choose Columns\" in Meds & Orders section of the refill encounter.              Passed - Medication is active on med list        Passed - Patient is age 18 or older        Passed - Normal serum creatinine on file in past 12 months     Recent Labs   Lab Test 12/11/18  1018   CR 1.07             Passed - Normal serum potassium on file in past 12 months     Recent Labs   Lab Test 12/11/18  1018   POTASSIUM 4.4             Routing refill request to provider for review/approval because:   out of range:  blood pressure          "

## 2019-09-29 ENCOUNTER — HEALTH MAINTENANCE LETTER (OUTPATIENT)
Age: 74
End: 2019-09-29

## 2019-11-20 ASSESSMENT — ACTIVITIES OF DAILY LIVING (ADL): CURRENT_FUNCTION: NO ASSISTANCE NEEDED

## 2019-11-21 ENCOUNTER — OFFICE VISIT (OUTPATIENT)
Dept: INTERNAL MEDICINE | Facility: CLINIC | Age: 74
End: 2019-11-21
Payer: COMMERCIAL

## 2019-11-21 VITALS
SYSTOLIC BLOOD PRESSURE: 126 MMHG | HEIGHT: 72 IN | BODY MASS INDEX: 28.48 KG/M2 | HEART RATE: 61 BPM | OXYGEN SATURATION: 99 % | DIASTOLIC BLOOD PRESSURE: 84 MMHG | WEIGHT: 210.3 LBS | TEMPERATURE: 97.5 F

## 2019-11-21 DIAGNOSIS — I10 ESSENTIAL HYPERTENSION, BENIGN: Primary | ICD-10-CM

## 2019-11-21 DIAGNOSIS — R91.1 PULMONARY NODULE, LEFT: ICD-10-CM

## 2019-11-21 DIAGNOSIS — E78.5 HYPERLIPIDEMIA LDL GOAL <100: ICD-10-CM

## 2019-11-21 LAB
ANION GAP SERPL CALCULATED.3IONS-SCNC: 5 MMOL/L (ref 3–14)
BUN SERPL-MCNC: 19 MG/DL (ref 7–30)
CALCIUM SERPL-MCNC: 9 MG/DL (ref 8.5–10.1)
CHLORIDE SERPL-SCNC: 108 MMOL/L (ref 94–109)
CHOLEST SERPL-MCNC: 156 MG/DL
CO2 SERPL-SCNC: 25 MMOL/L (ref 20–32)
CREAT SERPL-MCNC: 1.06 MG/DL (ref 0.66–1.25)
GFR SERPL CREATININE-BSD FRML MDRD: 68 ML/MIN/{1.73_M2}
GLUCOSE SERPL-MCNC: 97 MG/DL (ref 70–99)
HDLC SERPL-MCNC: 59 MG/DL
LDLC SERPL CALC-MCNC: 82 MG/DL
NONHDLC SERPL-MCNC: 97 MG/DL
POTASSIUM SERPL-SCNC: 4.4 MMOL/L (ref 3.4–5.3)
SODIUM SERPL-SCNC: 138 MMOL/L (ref 133–144)
TRIGL SERPL-MCNC: 75 MG/DL

## 2019-11-21 PROCEDURE — 80061 LIPID PANEL: CPT | Performed by: INTERNAL MEDICINE

## 2019-11-21 PROCEDURE — 80048 BASIC METABOLIC PNL TOTAL CA: CPT | Performed by: INTERNAL MEDICINE

## 2019-11-21 PROCEDURE — 99214 OFFICE O/P EST MOD 30 MIN: CPT | Mod: 25 | Performed by: INTERNAL MEDICINE

## 2019-11-21 PROCEDURE — G0008 ADMIN INFLUENZA VIRUS VAC: HCPCS | Performed by: INTERNAL MEDICINE

## 2019-11-21 PROCEDURE — 36415 COLL VENOUS BLD VENIPUNCTURE: CPT | Performed by: INTERNAL MEDICINE

## 2019-11-21 PROCEDURE — 90662 IIV NO PRSV INCREASED AG IM: CPT | Performed by: INTERNAL MEDICINE

## 2019-11-21 RX ORDER — SIMVASTATIN 20 MG
20 TABLET ORAL AT BEDTIME
Qty: 90 TABLET | Refills: 3 | Status: SHIPPED | OUTPATIENT
Start: 2019-11-21 | End: 2020-02-03

## 2019-11-21 RX ORDER — LISINOPRIL 20 MG/1
20 TABLET ORAL DAILY
Qty: 90 TABLET | Refills: 3 | Status: SHIPPED | OUTPATIENT
Start: 2019-11-21 | End: 2020-02-03

## 2019-11-21 ASSESSMENT — MIFFLIN-ST. JEOR: SCORE: 1726.91

## 2019-11-21 NOTE — PROGRESS NOTES
Subjective     Guillermo Hurst is a 74 year old male who presents to clinic today for the following health issues:    HPI   Hyperlipidemia Follow-Up    Are you having any of the following symptoms? (Select all that apply)  No complaints of shortness of breath, chest pain or pressure.  No increased sweating or nausea with activity.  No left-sided neck or arm pain.  No complaints of pain in calves when walking 1-2 blocks.    Are you regularly taking any medication or supplement to lower your cholesterol?   Yes- statin    Are you having muscle aches or other side effects that you think could be caused by your cholesterol lowering medication?  No    Hypertension Follow-up  - ? Angioedema after eating breakfast one day.     Do you check your blood pressure regularly outside of the clinic? sometimes     Are you following a low salt diet? Yes    Are your blood pressures ever more than 140 on the top number (systolic) OR more   than 90 on the bottom number (diastolic), for example 140/90? Yes a few readings over 90      How many servings of fruits and vegetables do you eat daily?  2-3    On average, how many sweetened beverages do you drink each day (soda, juice, sweet tea, etc)?   occ sweetened beverage    How many days per week do you miss taking your medication? 0        Reviewed and updated as needed this visit by Provider         Review of Systems   ROS COMP: Constitutional, HEENT, cardiovascular, pulmonary, gi and gu systems are negative, except as otherwise noted.      Objective    /84   Pulse 61   Temp 97.5  F (36.4  C) (Temporal)   Ht 1.829 m (6')   Wt 95.4 kg (210 lb 4.8 oz)   SpO2 99%   BMI 28.52 kg/m    Body mass index is 28.52 kg/m .  Physical Exam   GENERAL APPEARANCE: healthy, alert and no distress  HENT: nose and mouth without ulcers or lesions and normal cephalic/atraumatic  NECK: no adenopathy, no asymmetry, masses, or scars and thyroid normal to palpation  RESP: lungs clear to auscultation  - no rales, rhonchi or wheezes  MS: extremities normal- no gross deformities noted  SKIN: no suspicious lesions or rashes    Labs reviewed in Epic        Assessment & Plan     1. Essential hypertension, benign  Well controlled. Cont meds  - lisinopril (PRINIVIL/ZESTRIL) 20 MG tablet; Take 1 tablet (20 mg) by mouth daily  Dispense: 90 tablet; Refill: 3  - Basic metabolic panel    2. Hyperlipidemia LDL goal <100  - simvastatin (ZOCOR) 20 MG tablet; Take 1 tablet (20 mg) by mouth At Bedtime  Dispense: 90 tablet; Refill: 3  - Lipid panel reflex to direct LDL Fasting    3. Pulmonary nodule, left  CT chest feb 2020. If negative can d/c surveillance       BMI:   Estimated body mass index is 28.52 kg/m  as calculated from the following:    Height as of this encounter: 1.829 m (6').    Weight as of this encounter: 95.4 kg (210 lb 4.8 oz).       See Patient Instructions    No follow-ups on file.    Juliocesar Tuttle MD  [facility]

## 2019-11-21 NOTE — NURSING NOTE
Chief Complaint   Patient presents with     Hyperlipidemia     Hypertension     /84   Pulse 61   Temp 97.5  F (36.4  C) (Temporal)   Ht 1.829 m (6')   Wt 95.4 kg (210 lb 4.8 oz)   SpO2 99%   BMI 28.52 kg/m   Estimated body mass index is 28.52 kg/m  as calculated from the following:    Height as of this encounter: 1.829 m (6').    Weight as of this encounter: 95.4 kg (210 lb 4.8 oz).        Health Maintenance due pending provider review:  NONE    n/a    Ginny Tanner CMA

## 2020-02-03 ENCOUNTER — MYC MEDICAL ADVICE (OUTPATIENT)
Dept: INTERNAL MEDICINE | Facility: CLINIC | Age: 75
End: 2020-02-03

## 2020-02-03 ENCOUNTER — MYC REFILL (OUTPATIENT)
Dept: INTERNAL MEDICINE | Facility: CLINIC | Age: 75
End: 2020-02-03

## 2020-02-03 DIAGNOSIS — I10 ESSENTIAL HYPERTENSION, BENIGN: ICD-10-CM

## 2020-02-03 DIAGNOSIS — E78.5 HYPERLIPIDEMIA LDL GOAL <100: ICD-10-CM

## 2020-02-04 NOTE — TELEPHONE ENCOUNTER
See mychart message regarding denial of medical services, looked to see if [employer] was scanned but did not see anything, may be in PCP folder.   Donna Carrillo RN

## 2020-02-05 RX ORDER — LISINOPRIL 20 MG/1
20 TABLET ORAL DAILY
Qty: 90 TABLET | Refills: 2 | Status: SHIPPED | OUTPATIENT
Start: 2020-02-05 | End: 2021-03-03

## 2020-02-05 RX ORDER — SIMVASTATIN 20 MG
20 TABLET ORAL AT BEDTIME
Qty: 90 TABLET | Refills: 2 | Status: SHIPPED | OUTPATIENT
Start: 2020-02-05 | End: 2021-03-03

## 2020-02-05 NOTE — TELEPHONE ENCOUNTER
Transferred previously approved prescription's to new preferred pharmacy.     Prescription approved per [facility].    Bhavani BEJARANON, RN, PHN

## 2020-02-05 NOTE — TELEPHONE ENCOUNTER
MD please see procedure to attempt to get CT covered for patient and the number attached to message to call .Adali Villanueva RN

## 2020-02-07 NOTE — TELEPHONE ENCOUNTER
Please see [employer] message response with denial letter attached.     Bhavani BEJARANON, RN, PHN

## 2020-03-15 ENCOUNTER — HEALTH MAINTENANCE LETTER (OUTPATIENT)
Age: 75
End: 2020-03-15

## 2020-03-21 ENCOUNTER — MYC MEDICAL ADVICE (OUTPATIENT)
Dept: INTERNAL MEDICINE | Facility: CLINIC | Age: 75
End: 2020-03-21

## 2020-03-21 NOTE — TELEPHONE ENCOUNTER
Please see [employer] message and advise.      Thank you,  Angelique TORRESRN BSN  [facility]  [phone]

## 2020-05-11 ENCOUNTER — MYC MEDICAL ADVICE (OUTPATIENT)
Dept: INTERNAL MEDICINE | Facility: CLINIC | Age: 75
End: 2020-05-11

## 2020-05-26 NOTE — TELEPHONE ENCOUNTER
Please assist patient with setting up a virtual visit to discussed Erectile dysfunction medication.     Bhavani BEJARANON, RN, PHN

## 2020-06-02 ENCOUNTER — E-VISIT (OUTPATIENT)
Dept: INTERNAL MEDICINE | Facility: CLINIC | Age: 75
End: 2020-06-02
Payer: COMMERCIAL

## 2020-06-02 DIAGNOSIS — N52.9 ERECTILE DYSFUNCTION, UNSPECIFIED ERECTILE DYSFUNCTION TYPE: Primary | ICD-10-CM

## 2020-06-02 PROCEDURE — 99421 OL DIG E/M SVC 5-10 MIN: CPT | Performed by: INTERNAL MEDICINE

## 2020-06-02 RX ORDER — SILDENAFIL 50 MG/1
50-100 TABLET, FILM COATED ORAL DAILY PRN
Qty: 15 TABLET | Refills: 11 | Status: SHIPPED | OUTPATIENT
Start: 2020-06-02 | End: 2021-06-21

## 2020-10-15 NOTE — PROGRESS NOTES
Subjective     Guillermo Hurst is a 75 year old male who presents to clinic today for the following health issues:    HPI         Musculoskeletal problem/pain  Onset/Duration: 2 months  Description  Location: Hip - right  Joint Swelling: no  Redness: no  Pain: YES  Warmth: no  Intensity:  moderate  Progression of Symptoms:  worsening  Accompanying signs and symptoms:   Fevers: no  Numbness/tingling/weakness: no  History  Trauma to the area: no- exercises, and usually pain would fade after a while, but stopped  Recent illness:  no  Previous similar problem: YES  Previous evaluation:  no  Precipitating or alleviating factors:  Aggravating factors include: exercise, standing in some positions and overuse  Therapies tried and outcome: rest/inactivity      Review of Systems   Constitutional, HEENT, cardiovascular, pulmonary, gi and gu systems are negative, except as otherwise noted.      Objective    BP (!) 154/78 (BP Location: Right arm, Patient Position: Chair, Cuff Size: Adult Regular)   Pulse 50   Temp 97.8  F (36.6  C) (Temporal)   Resp 18   Wt 95.3 kg (210 lb)   SpO2 97%   BMI 28.48 kg/m    Body mass index is 28.48 kg/m .  Physical Exam   GENERAL APPEARANCE: alert and no distress  CV: regular rates and rhythm, normal S1 S2, no S3 or S4 and no murmur, click or rub  MS: Nontender over the greater trochanter.  External rotation of the hip reproduces pain.  There is limited abduction of the hip when compared to the left.  Straight leg raise produces pain in the posterior thigh but not below the knee  On the right.  On the right  NEURO: Normal strength and tone, mentation intact and normal strength throughout            Assessment & Plan     Greater trochanteric pain syndrome of right lower extremity  Hip pain, right  Differential includes trochanteric bursitis/pain syndrome, osteoarthritis of the hip and even possibly radicular pain from the back.  History seems more consistent with hip/trochanteric origin by  exam positive for straight leg raise and he has history of back issues.  Given its not clear we will get film of the right hip.  Have him see sports medicine for their opinion.  In the meantime he is getting some relief with NSAIDs therefore given the option of using topical NSAIDs, oral NSAIDs plus PPI.  - XR Hip Right 2-3 Views; Future  - Orthopedic & Spine  Referral; Future     BMI:   Estimated body mass index is 28.48 kg/m  as calculated from the following:    Height as of 11/21/19: 1.829 m (6').    Weight as of this encounter: 95.3 kg (210 lb).            See Patient Instructions    Return for Illness follow up visit as needed.    Juliocesar Tuttle MD  [facility]

## 2020-10-16 ENCOUNTER — OFFICE VISIT (OUTPATIENT)
Dept: INTERNAL MEDICINE | Facility: CLINIC | Age: 75
End: 2020-10-16
Payer: COMMERCIAL

## 2020-10-16 ENCOUNTER — TELEPHONE (OUTPATIENT)
Dept: INTERNAL MEDICINE | Facility: CLINIC | Age: 75
End: 2020-10-16

## 2020-10-16 ENCOUNTER — ANCILLARY PROCEDURE (OUTPATIENT)
Dept: GENERAL RADIOLOGY | Facility: CLINIC | Age: 75
End: 2020-10-16
Attending: INTERNAL MEDICINE
Payer: COMMERCIAL

## 2020-10-16 VITALS
RESPIRATION RATE: 18 BRPM | BODY MASS INDEX: 28.48 KG/M2 | HEART RATE: 50 BPM | TEMPERATURE: 97.8 F | DIASTOLIC BLOOD PRESSURE: 78 MMHG | SYSTOLIC BLOOD PRESSURE: 154 MMHG | WEIGHT: 210 LBS | OXYGEN SATURATION: 97 %

## 2020-10-16 DIAGNOSIS — M25.551 HIP PAIN, RIGHT: ICD-10-CM

## 2020-10-16 DIAGNOSIS — M25.551 GREATER TROCHANTERIC PAIN SYNDROME OF RIGHT LOWER EXTREMITY: Primary | ICD-10-CM

## 2020-10-16 PROCEDURE — 99214 OFFICE O/P EST MOD 30 MIN: CPT | Performed by: INTERNAL MEDICINE

## 2020-10-16 PROCEDURE — 73502 X-RAY EXAM HIP UNI 2-3 VIEWS: CPT | Mod: RT | Performed by: INTERNAL MEDICINE

## 2020-10-16 NOTE — PATIENT INSTRUCTIONS
Any OTC NSAID (ibuprofen/aleve/advil) + daily omeprazole 20 mg     Topical lidocaine    Topical NSAID (diclofenac cream)

## 2020-10-16 NOTE — TELEPHONE ENCOUNTER
Prior Authorization Retail Medication Request    Medication/Dose: diclofenac (VOLTAREN) 1 % topical gel  ICD code (if different than what is on RX):  M25.551  Previously Tried and Failed:    Rationale:      Insurance Name:  BC[employer] Medicare Advantage  Insurance ID:  [id number]      Pharmacy Information (if different than what is on RX)  Name:  [facility]  Phone:  [phone]

## 2020-10-19 NOTE — TELEPHONE ENCOUNTER
PRIOR AUTHORIZATION DENIED    Medication: diclofenac (VOLTAREN) 1 % topical gel    Denial Date: 10/19/2020    Denial Rational: Diclofenac gel is only covered for osteoarthritis of the following joints: wrist, hand, knee, ankle, feet, elbows. It is not covered for hip or shoulder joints.        Appeal Information: If provider would like to appeal this decision we will need a detailed letter of medical necessity to start the process. Then re-route this request back to the PA pool.

## 2020-10-19 NOTE — TELEPHONE ENCOUNTER
PA Initiation    Medication: diclofenac (VOLTAREN) 1 % topical gel  Insurance Company: [employer] [place] - Phone [phone] Fax [phone]  Pharmacy Filling the Rx: [id number] - [place], [place] - [address]  Filling Pharmacy Phone: [phone]  Filling Pharmacy Fax: [phone]  Start Date: 10/19/2020

## 2020-10-23 ENCOUNTER — OFFICE VISIT (OUTPATIENT)
Dept: ORTHOPEDICS | Facility: CLINIC | Age: 75
End: 2020-10-23
Attending: INTERNAL MEDICINE
Payer: COMMERCIAL

## 2020-10-23 VITALS
DIASTOLIC BLOOD PRESSURE: 70 MMHG | BODY MASS INDEX: 28.44 KG/M2 | WEIGHT: 210 LBS | SYSTOLIC BLOOD PRESSURE: 142 MMHG | HEIGHT: 72 IN

## 2020-10-23 DIAGNOSIS — M25.551 HIP PAIN, RIGHT: ICD-10-CM

## 2020-10-23 DIAGNOSIS — S76.311A STRAIN OF RIGHT HAMSTRING, INITIAL ENCOUNTER: ICD-10-CM

## 2020-10-23 DIAGNOSIS — M76.01 GLUTEAL TENDINITIS OF RIGHT BUTTOCK: Primary | ICD-10-CM

## 2020-10-23 PROCEDURE — 99203 OFFICE O/P NEW LOW 30 MIN: CPT | Performed by: FAMILY MEDICINE

## 2020-10-23 ASSESSMENT — MIFFLIN-ST. JEOR: SCORE: 1720.55

## 2020-10-23 NOTE — LETTER
10/23/2020         RE: Guillermo Hurst  [address] [address]        Dear Colleague,    Thank you for referring your patient, Guillermo Hurst, to the [facility] [place]. Please see a copy of my visit note below.    ASSESSMENT & PLAN  Patient Instructions     1. Gluteal tendinitis of right buttock    2. Hip pain, right    3. Strain of right hamstring, initial encounter      -Patient has right lateral hip pain due to gluteus medius tendinitis and posterior hip pain due to hamstring strain  -Patient will continue with daily Voltaren gel as needed.  Patient may use an occasional Aleve as needed  -Patient will ice the hip as needed as well  -Patient states his daughter is a physical therapist and so will obtain a home exercise program through her.  If he continues to have pain, he will call us for a formal physical therapy order.  -We will follow-up if pain does not improve.  -Call direct clinic number [[phone]] at any time with questions or concerns.    Albert Yeo MD [facility]  [facility]          -----    SUBJECTIVE  Guillermo Hurst is a/an 75 year old male who is seen in consultation at the request of  Juliocesar Tuttle M.D. for evaluation of right hip pain. The patient is seen by themselves.    Onset: 2 month(s) ago. Reports insidious onset without acute precipitating event.  Location of Pain: right hip and buttock pain  Rating of Pain at worst: 10/10  Rating of Pain Currently: 2/10  Worsened by: long strides, pulling up of toes on the right side, walking long periods  Better with: Voltaren gel,   Treatments tried: Aleve, home exercises, previous imaging (xray 10/16/2020) and Voltaren gel and CBD gel  Quality: sharp constant pain in right buttock that stops upper hamstring  Associated symptoms: locking or catching  Orthopedic history: NO  Relevant surgical history: YES - Date: 3 back  "surgeries (last one in , cleaned out and pulled in a \"strut and spacer along L4/5); Right Meniscus Surgery in 2018  Social history: social history: retired    Past Medical History:   Diagnosis Date     Essential hypertension, benign     Hypertension, Benign     LBBB (left bundle branch block)      Nephritis and nephropathy, not specified as acute or chronic, with unspecified pathological lesion in kidney      Vertigo     has had multiple ear surgeries- told due to this by ENT     VIRAL PERICARDITIS 1975    resolved     Social History     Socioeconomic History     Marital status:      Spouse name: Not on file     Number of children: 3     Years of education: Not on file     Highest education level: Not on file   Occupational History     Occupation: [employer] management     Comment: [employer]   Social Needs     Financial resource strain: Not on file     Food insecurity     Worry: Not on file     Inability: Not on file     Transportation needs     Medical: Not on file     Non-medical: Not on file   Tobacco Use     Smoking status: Former Smoker     Packs/day: 1.00     Years: 20.00     Pack years: 20.00     Types: Cigarettes     Quit date: 1985     Years since quittin.2     Smokeless tobacco: Never Used   Substance and Sexual Activity     Alcohol use: Yes     Alcohol/week: 0.0 standard drinks     Comment: Avg (1.5 per day, wine, beer or cocktail)     Drug use: No     Sexual activity: Yes     Partners: Female     Birth control/protection: Surgical   Lifestyle     Physical activity     Days per week: Not on file     Minutes per session: Not on file     Stress: Not on file   Relationships     Social connections     Talks on phone: Not on file     Gets together: Not on file     Attends Jew service: Not on file     Active member of club or organization: Not on file     Attends meetings of clubs or organizations: Not on file     Relationship status: Not on file     Intimate partner violence     " Fear of current or ex partner: Not on file     Emotionally abused: Not on file     Physically abused: Not on file     Forced sexual activity: Not on file   Other Topics Concern     Parent/sibling w/ CABG, MI or angioplasty before 65F 55M? Yes   Social History Narrative     Not on file         Patient's past medical, surgical, social, and family histories were reviewed today and no changes are noted.    REVIEW OF SYSTEMS:  10 point ROS is negative other than symptoms noted above in HPI, Past Medical History or as stated below  Constitutional: NEGATIVE for fever, chills, change in weight  Skin: NEGATIVE for worrisome rashes, moles or lesions  GI/: NEGATIVE for bowel or bladder changes  Neuro: NEGATIVE for weakness, dizziness or paresthesias    OBJECTIVE:  BP (!) 142/70   Ht 1.829 m (6')   Wt 95.3 kg (210 lb)   BMI 28.48 kg/m     General: healthy, alert and in no distress  HEENT: no scleral icterus or conjunctival erythema  Skin: no suspicious lesions or rash. No jaundice.  CV: no pedal edema  Resp: normal respiratory effort without conversational dyspnea   Psych: normal mood and affect  Gait: normal steady gait with appropriate coordination and balance  Neuro: Normal light sensory exam of lower extremity  MSK:  RIGHT HIP  Inspection:    No obvious deformity or asymmetry, level pelvis  Palpation:    Tender about the gluteus medius insertion and proximal hamstring attachment. Otherwise all other landmarks are nontender.  Active Range of Motion:     Flexion limited slightly by pain limited by tightness, IR limited by tightness, ER  limited by tightness  Strength:    Flexion grossly intact, adduction grossly intact, abduction grossly intact; mild pain with activation of hamstring muscles in the right leg  Special Tests:    Positive: none; pain with passive flexion of the right hip    Negative: Logroll, resisted gluteus medius provocation, AJ, anterior impingement (FADIR), posterior impingement (EX/AB/ER),  Joyce's    Independent visualization of the below image:  No results found for this or any previous visit (from the past 24 hour(s)).   RIGHT HIP TWO TO THREE VIEWS  10/16/2020 1:38 PM      HISTORY:  Right hip pain.     COMPARISON: 6/28/2017     FINDINGS: Lower lumbar degenerative and surgical change.                                                                      IMPRESSION: At least mild, if not mild-moderate, hip osteoarthritis.  There is bony convexity at the lateral femoral head-neck junction.  This can predispose to femoroacetabular impingement, and clinical  correlation is recommended. No fracture identified.         Albert Yeo MD [facility]        Again, thank you for allowing me to participate in the care of your patient.        Sincerely,        Albert Yeo, MD

## 2020-10-23 NOTE — PROGRESS NOTES
"ASSESSMENT & PLAN  Patient Instructions     1. Gluteal tendinitis of right buttock    2. Hip pain, right    3. Strain of right hamstring, initial encounter      -Patient has right lateral hip pain due to gluteus medius tendinitis and posterior hip pain due to hamstring strain  -Patient will continue with daily Voltaren gel as needed.  Patient may use an occasional Aleve as needed  -Patient will ice the hip as needed as well  -Patient states his daughter is a physical therapist and so will obtain a home exercise program through her.  If he continues to have pain, he will call us for a formal physical therapy order.  -We will follow-up if pain does not improve.  -Call direct clinic number [[phone]] at any time with questions or concerns.    Albert Yeo MD [facility]  [facility]          -----    SUBJECTIVE  Guillermo Hurst is a/an 75 year old male who is seen in consultation at the request of  Juliocesar Tuttle M.D. for evaluation of right hip pain. The patient is seen by themselves.    Onset: 2 month(s) ago. Reports insidious onset without acute precipitating event.  Location of Pain: right hip and buttock pain  Rating of Pain at worst: 10/10  Rating of Pain Currently: 2/10  Worsened by: long strides, pulling up of toes on the right side, walking long periods  Better with: Voltaren gel,   Treatments tried: Aleve, home exercises, previous imaging (xray 10/16/2020) and Voltaren gel and CBD gel  Quality: sharp constant pain in right buttock that stops upper hamstring  Associated symptoms: locking or catching  Orthopedic history: NO  Relevant surgical history: YES - Date: 3 back surgeries (last one in 2002, cleaned out and pulled in a \"strut and spacer along L4/5); Right Meniscus Surgery in 2018  Social history: social history: retired    Past Medical History:   Diagnosis Date     Essential hypertension, benign     Hypertension, Benign     LBBB (left bundle " branch block)      Nephritis and nephropathy, not specified as acute or chronic, with unspecified pathological lesion in kidney      Vertigo     has had multiple ear surgeries- told due to this by ENT     VIRAL PERICARDITIS 1975    resolved     Social History     Socioeconomic History     Marital status:      Spouse name: Not on file     Number of children: 3     Years of education: Not on file     Highest education level: Not on file   Occupational History     Occupation: [employer] management     Comment: [employer]   Social Needs     Financial resource strain: Not on file     Food insecurity     Worry: Not on file     Inability: Not on file     Transportation needs     Medical: Not on file     Non-medical: Not on file   Tobacco Use     Smoking status: Former Smoker     Packs/day: 1.00     Years: 20.00     Pack years: 20.00     Types: Cigarettes     Quit date: 1985     Years since quittin.2     Smokeless tobacco: Never Used   Substance and Sexual Activity     Alcohol use: Yes     Alcohol/week: 0.0 standard drinks     Comment: Avg (1.5 per day, wine, beer or cocktail)     Drug use: No     Sexual activity: Yes     Partners: Female     Birth control/protection: Surgical   Lifestyle     Physical activity     Days per week: Not on file     Minutes per session: Not on file     Stress: Not on file   Relationships     Social connections     Talks on phone: Not on file     Gets together: Not on file     Attends Evangelical service: Not on file     Active member of club or organization: Not on file     Attends meetings of clubs or organizations: Not on file     Relationship status: Not on file     Intimate partner violence     Fear of current or ex partner: Not on file     Emotionally abused: Not on file     Physically abused: Not on file     Forced sexual activity: Not on file   Other Topics Concern     Parent/sibling w/ CABG, MI or angioplasty before 65F 55M? Yes   Social History Narrative     Not on file          Patient's past medical, surgical, social, and family histories were reviewed today and no changes are noted.    REVIEW OF SYSTEMS:  10 point ROS is negative other than symptoms noted above in HPI, Past Medical History or as stated below  Constitutional: NEGATIVE for fever, chills, change in weight  Skin: NEGATIVE for worrisome rashes, moles or lesions  GI/: NEGATIVE for bowel or bladder changes  Neuro: NEGATIVE for weakness, dizziness or paresthesias    OBJECTIVE:  BP (!) 142/70   Ht 1.829 m (6')   Wt 95.3 kg (210 lb)   BMI 28.48 kg/m     General: healthy, alert and in no distress  HEENT: no scleral icterus or conjunctival erythema  Skin: no suspicious lesions or rash. No jaundice.  CV: no pedal edema  Resp: normal respiratory effort without conversational dyspnea   Psych: normal mood and affect  Gait: normal steady gait with appropriate coordination and balance  Neuro: Normal light sensory exam of lower extremity  MSK:  RIGHT HIP  Inspection:    No obvious deformity or asymmetry, level pelvis  Palpation:    Tender about the gluteus medius insertion and proximal hamstring attachment. Otherwise all other landmarks are nontender.  Active Range of Motion:     Flexion limited slightly by pain limited by tightness, IR limited by tightness, ER  limited by tightness  Strength:    Flexion grossly intact, adduction grossly intact, abduction grossly intact; mild pain with activation of hamstring muscles in the right leg  Special Tests:    Positive: none; pain with passive flexion of the right hip    Negative: Logroll, resisted gluteus medius provocation, AJ, anterior impingement (FADIR), posterior impingement (EX/AB/ER), Joyce's    Independent visualization of the below image:  No results found for this or any previous visit (from the past 24 hour(s)).   RIGHT HIP TWO TO THREE VIEWS  10/16/2020 1:38 PM      HISTORY:  Right hip pain.     COMPARISON: 6/28/2017     FINDINGS: Lower lumbar degenerative and surgical  change.                                                                      IMPRESSION: At least mild, if not mild-moderate, hip osteoarthritis.  There is bony convexity at the lateral femoral head-neck junction.  This can predispose to femoroacetabular impingement, and clinical  correlation is recommended. No fracture identified.         Albert Yeo MD [facility]

## 2020-10-23 NOTE — PATIENT INSTRUCTIONS
1. Gluteal tendinitis of right buttock    2. Hip pain, right    3. Strain of right hamstring, initial encounter      -Patient has right lateral hip pain due to gluteus medius tendinitis and posterior hip pain due to hamstring strain  -Patient will continue with daily Voltaren gel as needed.  Patient may use an occasional Aleve as needed  -Patient will ice the hip as needed as well  -Patient states his daughter is a physical therapist and so will obtain a home exercise program through her.  If he continues to have pain, he will call us for a formal physical therapy order.  -We will follow-up if pain does not improve.  -Call direct clinic number [[phone]] at any time with questions or concerns.    Albert Yeo MD [facility]  [facility]

## 2021-01-14 ENCOUNTER — HEALTH MAINTENANCE LETTER (OUTPATIENT)
Age: 76
End: 2021-01-14

## 2021-02-17 ENCOUNTER — IMMUNIZATION (OUTPATIENT)
Dept: NURSING | Facility: CLINIC | Age: 76
End: 2021-02-17
Payer: COMMERCIAL

## 2021-02-17 PROCEDURE — 0001A PR COVID VAC PFIZER DIL RECON 30 MCG/0.3 ML IM: CPT

## 2021-02-17 PROCEDURE — 91300 PR COVID VAC PFIZER DIL RECON 30 MCG/0.3 ML IM: CPT

## 2021-02-24 ASSESSMENT — ACTIVITIES OF DAILY LIVING (ADL): CURRENT_FUNCTION: NO ASSISTANCE NEEDED

## 2021-03-03 ENCOUNTER — OFFICE VISIT (OUTPATIENT)
Dept: INTERNAL MEDICINE | Facility: CLINIC | Age: 76
End: 2021-03-03
Payer: COMMERCIAL

## 2021-03-03 VITALS
WEIGHT: 207.9 LBS | TEMPERATURE: 97.6 F | BODY MASS INDEX: 28.16 KG/M2 | OXYGEN SATURATION: 99 % | SYSTOLIC BLOOD PRESSURE: 150 MMHG | HEIGHT: 72 IN | HEART RATE: 73 BPM | DIASTOLIC BLOOD PRESSURE: 81 MMHG

## 2021-03-03 DIAGNOSIS — Z11.59 NEED FOR HEPATITIS C SCREENING TEST: ICD-10-CM

## 2021-03-03 DIAGNOSIS — I10 ESSENTIAL HYPERTENSION, BENIGN: ICD-10-CM

## 2021-03-03 DIAGNOSIS — Z85.828 HISTORY OF BASAL CELL CARCINOMA: ICD-10-CM

## 2021-03-03 DIAGNOSIS — R91.1 PULMONARY NODULE, LEFT: ICD-10-CM

## 2021-03-03 DIAGNOSIS — I44.7 LBBB (LEFT BUNDLE BRANCH BLOCK): ICD-10-CM

## 2021-03-03 DIAGNOSIS — E78.5 HYPERLIPIDEMIA LDL GOAL <100: ICD-10-CM

## 2021-03-03 DIAGNOSIS — M16.0 PRIMARY OSTEOARTHRITIS OF BOTH HIPS: ICD-10-CM

## 2021-03-03 DIAGNOSIS — Z00.00 MEDICARE ANNUAL WELLNESS VISIT, SUBSEQUENT: Primary | ICD-10-CM

## 2021-03-03 LAB
ANION GAP SERPL CALCULATED.3IONS-SCNC: 8 MMOL/L (ref 3–14)
BUN SERPL-MCNC: 21 MG/DL (ref 7–30)
CALCIUM SERPL-MCNC: 9.8 MG/DL (ref 8.5–10.1)
CHLORIDE SERPL-SCNC: 108 MMOL/L (ref 94–109)
CHOLEST SERPL-MCNC: 163 MG/DL
CO2 SERPL-SCNC: 23 MMOL/L (ref 20–32)
CREAT SERPL-MCNC: 1.06 MG/DL (ref 0.66–1.25)
GFR SERPL CREATININE-BSD FRML MDRD: 68 ML/MIN/{1.73_M2}
GLUCOSE SERPL-MCNC: 99 MG/DL (ref 70–99)
HCV AB SERPL QL IA: NONREACTIVE
HDLC SERPL-MCNC: 58 MG/DL
LDLC SERPL CALC-MCNC: 90 MG/DL
NONHDLC SERPL-MCNC: 105 MG/DL
POTASSIUM SERPL-SCNC: 4.7 MMOL/L (ref 3.4–5.3)
SODIUM SERPL-SCNC: 139 MMOL/L (ref 133–144)
TRIGL SERPL-MCNC: 76 MG/DL

## 2021-03-03 PROCEDURE — 86803 HEPATITIS C AB TEST: CPT | Performed by: INTERNAL MEDICINE

## 2021-03-03 PROCEDURE — 36415 COLL VENOUS BLD VENIPUNCTURE: CPT | Performed by: INTERNAL MEDICINE

## 2021-03-03 PROCEDURE — 80048 BASIC METABOLIC PNL TOTAL CA: CPT | Performed by: INTERNAL MEDICINE

## 2021-03-03 PROCEDURE — G0439 PPPS, SUBSEQ VISIT: HCPCS | Performed by: INTERNAL MEDICINE

## 2021-03-03 PROCEDURE — 80061 LIPID PANEL: CPT | Performed by: INTERNAL MEDICINE

## 2021-03-03 PROCEDURE — 93000 ELECTROCARDIOGRAM COMPLETE: CPT | Performed by: INTERNAL MEDICINE

## 2021-03-03 PROCEDURE — 99213 OFFICE O/P EST LOW 20 MIN: CPT | Mod: 25 | Performed by: INTERNAL MEDICINE

## 2021-03-03 RX ORDER — SIMVASTATIN 20 MG
20 TABLET ORAL AT BEDTIME
Qty: 90 TABLET | Refills: 3 | Status: SHIPPED | OUTPATIENT
Start: 2021-03-03 | End: 2022-04-26

## 2021-03-03 RX ORDER — LISINOPRIL 20 MG/1
20 TABLET ORAL DAILY
Qty: 90 TABLET | Refills: 3 | Status: SHIPPED | OUTPATIENT
Start: 2021-03-03 | End: 2022-04-26

## 2021-03-03 SDOH — HEALTH STABILITY: MENTAL HEALTH: HOW OFTEN DO YOU HAVE 6 OR MORE DRINKS ON ONE OCCASION?: NEVER

## 2021-03-03 SDOH — HEALTH STABILITY: MENTAL HEALTH: HOW MANY STANDARD DRINKS CONTAINING ALCOHOL DO YOU HAVE ON A TYPICAL DAY?: 1 OR 2

## 2021-03-03 SDOH — HEALTH STABILITY: MENTAL HEALTH: HOW OFTEN DO YOU HAVE A DRINK CONTAINING ALCOHOL?: 4 OR MORE TIMES A WEEK

## 2021-03-03 ASSESSMENT — ACTIVITIES OF DAILY LIVING (ADL): CURRENT_FUNCTION: NO ASSISTANCE NEEDED

## 2021-03-03 ASSESSMENT — MIFFLIN-ST. JEOR: SCORE: 1703.09

## 2021-03-03 NOTE — NURSING NOTE
"Chief Complaint   Patient presents with     Physical     pt is fasting     BP (!) 182/82   Pulse 73   Temp 97.6  F (36.4  C) (Temporal)   Ht 1.816 m (5' 11.5\")   Wt 94.3 kg (207 lb 14.4 oz)   SpO2 99%   BMI 28.59 kg/m   Estimated body mass index is 28.59 kg/m  as calculated from the following:    Height as of this encounter: 1.816 m (5' 11.5\").    Weight as of this encounter: 94.3 kg (207 lb 14.4 oz).        Ginny Tanner CMA  "

## 2021-03-03 NOTE — PROGRESS NOTES
"SUBJECTIVE:   Guillermo Hurst is a 75 year old male who presents for Preventive Visit.    Patient has been advised of split billing requirements and indicates understanding: Yes   Are you in the first 12 months of your Medicare coverage?  No    Healthy Habits:     In general, how would you rate your overall health?  Excellent    Frequency of exercise:  2-3 days/week    Duration of exercise:  30-45 minutes    Do you usually eat at least 4 servings of fruit and vegetables a day, include whole grains    & fiber and avoid regularly eating high fat or \"junk\" foods?  No    Taking medications regularly:  Yes    Medication side effects:  Muscle aches    Ability to successfully perform activities of daily living:  No assistance needed    Home Safety:  Lack of grab bars in the bathroom    Hearing Impairment:  No hearing concerns    In the past 6 months, have you been bothered by leaking of urine?  No    In general, how would you rate your overall mental or emotional health?  Excellent      PHQ-2 Total Score: 0    Additional concerns today:  Yes    Would like to know if he should get the 2nd COVID vaccine.  Arthritis-general conversation  Spot on lung, would like to check on that  PSA-has been 7 years.   Derm, spots on back and neck-been there for years, would like them rechecked    Do you feel safe in your environment? YES    Have you ever done Advance Care Planning? (For example, a Health Directive, POLST, or a discussion with a medical provider or your loved ones about your wishes): No, advance care planning information given to patient to review.  Patient declined advance care planning discussion at this time.       Fall risk  Fallen 2 or more times in the past year?: No  Any fall with injury in the past year?: No    Cognitive Screening   1) Repeat 3 items (Leader, Season, Table)    2) Clock draw: NORMAL  3) 3 item recall: Recalls 3 objects  Results: 3 items recalled: COGNITIVE IMPAIRMENT LESS LIKELY    Mini-CogTM " Copyright RUDY Lopez. Licensed by the author for use in [facility]; reprinted with permission ([email or web address]). All rights reserved.      Do you have sleep apnea, excessive snoring or daytime drowsiness?: no    Reviewed and updated as needed this visit by clinical staff  Tobacco  Allergies  Meds   Med Hx  Surg Hx  Fam Hx  Soc Hx        Reviewed and updated as needed this visit by Provider  Tobacco       Fam Hx         Social History     Tobacco Use     Smoking status: Former Smoker     Packs/day: 1.00     Years: 20.00     Pack years: 20.00     Types: Cigarettes     Quit date: 1985     Years since quittin.6     Smokeless tobacco: Never Used   Substance Use Topics     Alcohol use: Yes     Frequency: 4 or more times a week     Drinks per session: 1 or 2     Binge frequency: Never     Comment: Avg (1.5 per day, wine, beer or cocktail)         No flowsheet data found.      Current providers sharing in care for this patient include:   Patient Care Team:  Juliocesar Tuttle MD as PCP - General  Juliocesar Tuttle MD as Assigned PCP  Yeo, Albert, MD as Assigned Musculoskeletal Provider    The following health maintenance items are reviewed in Epic and correct as of today:  Health Maintenance   Topic Date Due     HEPATITIS C SCREENING  1963     ZOSTER IMMUNIZATION (1 of 2) 1994     INFLUENZA VACCINE (1) 2020     BMP  2020     LIPID  2020     COVID-19 Vaccine (2 of 2 - Pfizer series) 03/10/2021     FALL RISK ASSESSMENT  10/16/2021     MEDICARE ANNUAL WELLNESS VISIT  2022     DTAP/TDAP/TD IMMUNIZATION (4 - Td) 2023     ADVANCE CARE PLANNING  2026     COLORECTAL CANCER SCREENING  2029     PHQ-2  Completed     Pneumococcal Vaccine: 65+ Years  Completed     Pneumococcal Vaccine: Pediatrics (0 to 5 Years) and At-Risk Patients (6 to 64 Years)  Aged Out     IPV IMMUNIZATION  Aged Out     MENINGITIS IMMUNIZATION  Aged Out     HEPATITIS B IMMUNIZATION   "Aged Out     Lab work is in process  Labs reviewed in EPIC      Review of Systems  Constitutional, HEENT, cardiovascular, pulmonary, gi and gu systems are negative, except as otherwise noted.    OBJECTIVE:   BP (!) 150/81   Pulse 73   Temp 97.6  F (36.4  C) (Temporal)   Ht 1.816 m (5' 11.5\")   Wt 94.3 kg (207 lb 14.4 oz)   SpO2 99%   BMI 28.59 kg/m   Estimated body mass index is 28.59 kg/m  as calculated from the following:    Height as of this encounter: 1.816 m (5' 11.5\").    Weight as of this encounter: 94.3 kg (207 lb 14.4 oz).  Physical Exam  GENERAL: healthy, alert and no distress  EYES: Eyes grossly normal to inspection, PERRL and conjunctivae and sclerae normal  HENT: ear canals and TM's normal, nose and mouth without ulcers or lesions  NECK: no adenopathy, no asymmetry, masses, or scars and thyroid normal to palpation  RESP: lungs clear to auscultation - no rales, rhonchi or wheezes  CV: occasional premature beats, normal S1 S2, no S3 or S4, no murmur, click or rub, peripheral pulses strong and no peripheral edema  ABDOMEN: soft, nontender, no hepatosplenomegaly, no masses and bowel sounds normal  MS: no gross musculoskeletal defects noted, no edema  SKIN: no suspicious lesions or rashes  NEURO: Normal strength and tone, mentation intact and speech normal  PSYCH: mentation appears normal, affect normal/bright    Labs reviewed in [facility]  ekg- LBBB, few PACs    ASSESSMENT / PLAN:   1. Medicare annual wellness visit, subsequent  Updated screening, immunizations, prevention.  Please see health maintenance list, care gaps   Discussion w/pt regarding PSA /prostate ca screening. He requested this- discussed screening not rec'd at his age, his prior PSA levels put him at very low risk.     2. Essential hypertension, benign  Elevated here but home # are quite good- continue current meds  - Basic metabolic panel  - lisinopril (ZESTRIL) 20 MG tablet; Take 1 tablet (20 mg) by mouth daily  Dispense: 90 tablet; " "Refill: 3    3. Hyperlipidemia LDL goal <100  Cont statin for primary prevention  - Lipid panel reflex to direct LDL Fasting  - simvastatin (ZOCOR) 20 MG tablet; Take 1 tablet (20 mg) by mouth At Bedtime  Dispense: 90 tablet; Refill: 3    4. LBBB (left bundle branch block) w/PACs  - EKG 12-lead complete w/read - Clinics    5. Primary osteoarthritis of both hips  Not interested in ortho consult - tylenol, topical voltaren    6. History of basal cell carcinoma  - DERMATOLOGY ADULT REFERRAL; Future    7. Pulmonary nodule, left  Needs 1 addtl CT to complete full 24 mo f/u   - CT Chest w/o Contrast; Future    8. Need for hepatitis C screening test  - Hepatitis C Screen Reflex to HCV RNA Quant and Genotype    Total of 20 minutes were spent with the patient today spent on issues unrelated.      Patient has been advised of split billing requirements and indicates understanding: Yes  COUNSELING:  Reviewed preventive health counseling, as reflected in patient instructions    Estimated body mass index is 28.59 kg/m  as calculated from the following:    Height as of this encounter: 1.816 m (5' 11.5\").    Weight as of this encounter: 94.3 kg (207 lb 14.4 oz).        He reports that he quit smoking about 35 years ago. His smoking use included cigarettes. He has a 20.00 pack-year smoking history. He has never used smokeless tobacco.      Appropriate preventive services were discussed with this patient, including applicable screening as appropriate for cardiovascular disease, diabetes, osteopenia/osteoporosis, and glaucoma.  As appropriate for age/gender, discussed screening for colorectal cancer, prostate cancer, breast cancer, and cervical cancer. Checklist reviewing preventive services available has been given to the patient.    Reviewed patients plan of care and provided an AVS. The Basic Care Plan (routine screening as documented in Health Maintenance) for Guillermo meets the Care Plan requirement. This Care Plan has been " established and reviewed with the Patient.    Counseling Resources:  ATP IV Guidelines  Pooled Cohorts Equation Calculator  Breast Cancer Risk Calculator  Breast Cancer: Medication to Reduce Risk  FRAX Risk Assessment  ICSI Preventive Guidelines  Dietary Guidelines for Americans, 2010  USDA's MyPlate  ASA Prophylaxis  Lung CA Screening    Juliocesar Tuttle MD  [facility]    Identified Health Risks:    The patient was counseled and encouraged to consider modifying their diet and eating habits. He was provided with information on recommended healthy diet options.

## 2021-03-03 NOTE — PATIENT INSTRUCTIONS
Patient Education   Personalized Prevention Plan  You are due for the preventive services outlined below.  Your care team is available to assist you in scheduling these services.  If you have already completed any of these items, please share that information with your care team to update in your medical record.  Health Maintenance Due   Topic Date Due     Hepatitis C Screening  08/31/1963     Zoster (Shingles) Vaccine (1 of 2) 08/31/1994     Flu Vaccine (1) 09/01/2020     Basic Metabolic Panel  11/21/2020     Cholesterol Lab  11/21/2020     COVID-19 Vaccine (2 of 2 - Pfizer series) 03/10/2021       Understanding USDA MyPlate  The USDA has guidelines to help you make healthy food choices. These are called MyPlate. MyPlate shows the food groups that make up healthy meals using the image of a place setting. Before you eat, think about the healthiest choices for what to put on your plate or in your cup or bowl. To learn more about building a healthy plate, visit www.choosemyplate.gov.     The food groups    Fruits. Any fruit or 100% fruit juice counts as part of the Fruit Group. Fruits may be fresh, canned, frozen, or dried, and may be whole, cut-up, or pureed. Make 1/2 of your plate fruits and vegetables.    Vegetables. Any vegetable or 100% vegetable juice counts as a member of the Vegetable Group. Vegetables may be fresh, frozen, canned, or dried. They can be served raw or cooked and may be whole, cut-up, or mashed. Make 1/2 of your plate fruits and vegetables.    Grains. All foods made from grains are part of the Grains Group. These include wheat, rice, oats, cornmeal, and barley. Grains are often used to make foods such as bread, pasta, oatmeal, cereal, tortillas, and grits. Grains should be no more than 1/4 of your plate. At least half of your grains should be whole grains.    Protein. This group includes meat, poultry, seafood, beans and peas, eggs, processed soy products (such as tofu), nuts (including nut  butters), and seeds. Make protein choices no more than 1/4 of your plate. Meat and poultry choices should be lean or low fat.    Dairy. The Dairy Group includes all fluid milk products and foods made from milk that contain calcium, such as yogurt and cheese. (Foods that have little calcium, such as cream, butter, and cream cheese, are not part of this group.) Most dairy choices should be low-fat or fat-free.    Oils. Oils aren't a food group, but they do contain essential nutrients. However it's important to watch your intake of oils. These are fats that are liquid at room temperature. They include canola, corn, olive, soybean, vegetable, and sunflower oil. Foods that are mainly oil include mayonnaise, certain salad dressings, and soft margarines. You likely already get your daily oil allowance from the foods you eat.  Things to limit  Eating healthy also means limiting these things in your diet:    Salt (sodium). Many processed foods have a lot of sodium. To keep sodium intake down, eat fresh vegetables, meats, poultry, and seafood when possible. Purchase low-sodium, reduced-sodium, or no-salt-added food products at the store. And don't add salt to your meals at home. Instead, season them with herbs and spices such as dill, oregano, cumin, and paprika. Or try adding flavor with lemon or lime zest and juice.    Saturated fat. Saturated fats are most often found in animal products such as beef, pork, and chicken. They are often solid at room temperature, such as butter. To reduce your saturated fat intake, choose leaner cuts of meat and poultry. And try healthier cooking methods such as grilling, broiling, roasting, or baking. For a simple lower-fat swap, use plain nonfat yogurt instead of mayonnaise when making potato salad or macaroni salad.    Added sugars. These are sugars added to foods. They are in foods such as ice cream, candy, soda, fruit drinks, sports drinks, energy drinks, cookies, pastries, jams, and  syrups. Cut down on added sugars by sharing sweet treats with a family member or friend. You can also choose fruit for dessert, and drink water or other unsweetened beverages.  [employer] last reviewed this educational content on 6/1/2020 2000-2020 [employer]. [address] [place] [id number]. All rights reserved. This information is not intended as a substitute for professional medical care. Always follow your healthcare professional's instructions.           Patient Education     Prostate Cancer Screening: Making a Decision  Should you be screened for prostate cancer every year? Even if you have no symptoms? Experts disagree. Here are some things to think about as you make a decision. Be sure to talk with your healthcare provider, too, so you can make the choice that's right for you.     Talking with your healthcare provider will help you make an informed decision about prostate cancer screening.   Why prostate cancer screening is under debate  Not all healthcare providers agree that prostate cancer screening is useful. This is because:    PSA test results are not always right.  In some cases, the PSA test can have false-positive or false-negative results.  ? A false-positive means that test results show a man may have cancer when he doesn t. This can lead to more tests, which can lead to stress and possible harm from the tests.   ? A false-negative means that test results do not show cancer when a man does have it. This can mean you don't get the other tests or the treatment you need.    Finding prostate cancer early may not be helpful. Even if screening does help find cancer early, prostate cancer often grows slowly and most often affects older men. This means that finding it early may not lead to a longer life. Many men with prostate cancer die years later of other causes. They may never have symptoms or be treated for the cancer. But healthcare providers can t always tell which cancers  are likely to grow fast and should to be treated. And even if a cancer is slow-growing, a man may want it treated. Treatment for prostate cancer can have major side effects. These include erection problems (erectile dysfunction) and lack of urine control (incontinence).  Talking with your healthcare provider  Expert groups advise that men talk with their healthcare providers about the pros and cons of prostate cancer screening. This can help you make the right decision for you. Ask any questions you have about screening. Talk with your healthcare provider about:    Your personal risk for prostate cancer based on your age, race, and family history    What the screening test results can and can t tell you    What the next steps would be if the test results show you might have prostate cancer    What your choices would be for treating or not treating right away    What the treatment choices are if you were to have treatment and what the side effects might be  Bernard last reviewed this educational content on 12/1/2019 2000-2020 [employer]. [address] [place] [address]. All rights reserved. This information is not intended as a substitute for professional medical care. Always follow your healthcare professional's instructions.           Patient Education     Prostate-Specific Antigen (PSA)  Does this test have other names?  PSA  What is this test?  This test measures the level of prostate-specific antigen (PSA) in your blood.   The cells of the prostate gland make the protein called PSA. Men normally have low levels of PSA. If your PSA levels start to rise, it could mean you have one of the below:     Prostate cancer    A benign prostate condition    Inflammation of the prostate    An infection in the prostate  Experts don t all agree on when to have PSA testing.     The U.S. Preventative Services Task Force advises men who don't have any symptoms of prostate cancer not to have a PSA  test. The task force says that PSA test results can lead to treating small cancers that would never be life-threatening.    The American Cancer Society advises that men be told the risks and benefits of PSA testing and make their own decision about if and when to be screened.    The American Urologic Society says that PSA screening is most important between the ages of 55 and 69. If you have a brother or father with prostate cancer or you are , you should start testing at age 40.     Why do I need this test?  You may need this test if you are age 50 or older and your healthcare provider wants to screen you for prostate cancer. Some providers advise screening at age 40 or 45 if you have a family history of prostate cancer or other risk factors.   You may also have this test if you have already been diagnosed with prostate cancer. This is so your healthcare provider can check your treatment and see if your cancer has come back.   What other tests might I have along with this test?  Your healthcare provider may also do a digital rectal exam (JOVANY). A JOVANY is a physical exam of the prostate, not a lab test. For the exam, your provider will place a gloved finger in your rectum and feel the prostate to check the size and for any bumps or abnormal areas.   What do my test results mean?  Test results may vary depending on your age, gender, health history, the method used for the test, and other things. Your test results may not mean you have a problem. Ask your healthcare provider what your test results mean for you.   Results are given in nanograms per milliliter, ng/mL.   Results below 4.0 ng/mL are seen as normal. But some healthcare providers may use these age-based results brackets to define normal for you:      Ages 40 to 49: 0-2.5 ng/mL    Ages 50 to 59: 0-3.5 ng/mL    Ages 60 to 69: 0-4.5 ng/mL    Ages 70 to 79: 0-6.5 ng/mL  A rising PSA may mean that you have cancer. But the PSA results alone won't  tell your healthcare provider if it's cancer or a benign prostate condition. If your healthcare provider thinks you could have cancer, you may need a biopsy of the prostate to confirm the diagnosis.   How is this test done?  The test is done with a blood sample. A needle is used to draw blood from a vein in your arm or hand.   Does this test pose any risks?  Having a blood test with a needle carries some risks. These include bleeding, infection, bruising, and feeling lightheaded. When the needle pricks your arm or hand, you may feel a slight sting or pain. Afterward, the site may be sore.   What might affect my test results?  Test results can be affected by:    An infection    Some medicines    Riding a bike before the test    Ejaculation before the test  How do I get ready for this test?  You may be told not to have sex or ride a bike 1 to 2 days before the test. In addition, be sure your healthcare provider knows about all medicines, herbs, vitamins, and supplements you are taking. This includes medicines that don't need a prescription and any illegal drugs you may use.   [employer] last reviewed this educational content on 9/1/2020 2000-2020 [employer]. All rights reserved. This information is not intended as a substitute for professional medical care. Always follow your healthcare professional's instructions.           Patient Education     What Is Prostate Cancer?  Cancer starts when cells in the body change and grow out of control. Cancer cells can form lumps of tissue called tumors. Cancer that starts in the cells of the prostate is called prostate cancer. It can grow and spread beyond the prostate. Cancer that spreads is harder to treat.   Understanding the prostate  The prostate is a gland in men about the size and shape of a walnut behind the base of the penis. It wraps around the upper part of the urethra. This is the tube that carries urine from the bladder through the penis and out of  the body. The prostate makes some of the fluid that s part of semen. During orgasm, semen comes out of the body through the urethra.     When prostate cancer forms  As a man ages, the cells of his prostate may change to form tumors or other growths. These growths may be:     Non-cancerous (not cancer). As a man ages, the prostate tends to get bigger. This is called benign prostatic hyperplasia (BPH). With BPH, the extra prostate tissue often squeezes the urethra, causing symptoms such as trouble passing urine. But BPH is not cancer and it doesn't lead to cancer.    Atypical cells. Sometimes prostate cells don t look like normal (typical) prostate cells. One type of abnormal growth is called prostatic intraepithelial neoplasia or PIN. PIN cells are not cancer cells. But if the pattern of cells is very abnormal (called high-grade PIN), there's about a 1 in 5 chance that there might be cancer in another part of the prostate.    Cancer. When abnormal prostate cells grow out of control and start to invade other tissues, they're called cancer cells. These cells may or may not cause symptoms. Some tumors can be felt during a physical exam, but some can t. Over time, prostate cancer may grow into nearby organs or spread to nearby lymph nodes. Lymph nodes are small organs around the body that are part of the immune system. In some cases, the cancer spreads to bones or organs in distant parts of the body. This is called metastasis.  Diagnosing prostate cancer  Prostate cancer may not cause symptoms at first. Urinary problems are often not a sign of cancer, but of another condition, such as BPH. To find out if you have prostate cancer, your healthcare provider must examine you and do some tests. The tests help find out if a problem is caused by cancer. They also help give more information about the cancer. Common tests are:     Prostate specific antigen (PSA) testing. PSA is a chemical made by prostate cells. The amount of PSA  in the blood (PSA level) can be tested to check for prostate cancer. In general, a high or rising PSA level may mean there's cancer. But a PSA test by itself can't show if a man has prostate cancer.    Core needle biopsy.  This test is needed to know for sure if a man has prostate cancer. A hollow needle is used to take tiny pieces of tissue from the prostate. During the test, a small probe is put into the rectum. The probe sends an image of the prostate to a video screen. With this image as a guide, the healthcare provider uses a thin, hollow needle to remove tissue samples from all over the prostate. These are sent to a lab where they are looked at and tested for cancer cells.  [employer]oz[employer] last reviewed this educational content on 3/1/2020    4509-3299 [employer]. All rights reserved. This information is not intended as a substitute for professional medical care. Always follow your healthcare professional's instructions.

## 2021-03-09 ENCOUNTER — HOSPITAL ENCOUNTER (OUTPATIENT)
Dept: CT IMAGING | Facility: CLINIC | Age: 76
Discharge: HOME | End: 2021-03-09
Attending: INTERNAL MEDICINE | Admitting: INTERNAL MEDICINE
Payer: COMMERCIAL

## 2021-03-09 DIAGNOSIS — R91.1 PULMONARY NODULE, LEFT: ICD-10-CM

## 2021-03-09 PROCEDURE — 71250 CT THORAX DX C-: CPT

## 2021-03-10 ENCOUNTER — IMMUNIZATION (OUTPATIENT)
Dept: NURSING | Facility: CLINIC | Age: 76
End: 2021-03-10
Attending: INTERNAL MEDICINE
Payer: COMMERCIAL

## 2021-03-10 PROCEDURE — 0002A PR COVID VAC PFIZER DIL RECON 30 MCG/0.3 ML IM: CPT

## 2021-03-10 PROCEDURE — 91300 PR COVID VAC PFIZER DIL RECON 30 MCG/0.3 ML IM: CPT

## 2021-06-19 DIAGNOSIS — N52.9 ERECTILE DYSFUNCTION, UNSPECIFIED ERECTILE DYSFUNCTION TYPE: ICD-10-CM

## 2021-06-21 RX ORDER — SILDENAFIL 50 MG/1
50-100 TABLET, FILM COATED ORAL DAILY PRN
Qty: 15 TABLET | Refills: 8 | Status: SHIPPED | OUTPATIENT
Start: 2021-06-21 | End: 2022-05-10

## 2021-10-24 ENCOUNTER — HEALTH MAINTENANCE LETTER (OUTPATIENT)
Age: 76
End: 2021-10-24

## 2022-04-10 ENCOUNTER — HEALTH MAINTENANCE LETTER (OUTPATIENT)
Age: 77
End: 2022-04-10

## 2022-04-24 DIAGNOSIS — E78.5 HYPERLIPIDEMIA LDL GOAL <100: ICD-10-CM

## 2022-04-24 DIAGNOSIS — I10 ESSENTIAL HYPERTENSION, BENIGN: ICD-10-CM

## 2022-04-26 RX ORDER — LISINOPRIL 20 MG/1
20 TABLET ORAL DAILY
Qty: 90 TABLET | Refills: 0 | Status: SHIPPED | OUTPATIENT
Start: 2022-04-26 | End: 2022-07-29

## 2022-04-26 RX ORDER — SIMVASTATIN 20 MG
20 TABLET ORAL AT BEDTIME
Qty: 90 TABLET | Refills: 0 | Status: SHIPPED | OUTPATIENT
Start: 2022-04-26 | End: 2022-07-29

## 2022-04-26 NOTE — TELEPHONE ENCOUNTER
Routing refill request to provider for review/approval because:  Failed protocol due to:    Blood pressure under 140/90 in past 12 months    Recent (12 mo) or future (30 days) visit within the authorizing provider's specialty    Normal serum creatinine on file in past 12 months    Normal serum potassium on file in past 12 months     Pt scheduled for annual wellness at [place] 5/10/22.     Mikki Schaefer RN

## 2022-05-08 ASSESSMENT — ENCOUNTER SYMPTOMS
CONSTIPATION: 0
HEMATURIA: 0
PALPITATIONS: 0
HEMATOCHEZIA: 0
EYE PAIN: 0
FEVER: 0
SHORTNESS OF BREATH: 0
JOINT SWELLING: 0
PARESTHESIAS: 0
ARTHRALGIAS: 1
ABDOMINAL PAIN: 0
DIZZINESS: 0
NERVOUS/ANXIOUS: 0
HEARTBURN: 0
FREQUENCY: 0
CHILLS: 0
DIARRHEA: 0
DYSURIA: 0
MYALGIAS: 1
NAUSEA: 0
COUGH: 0
WEAKNESS: 0
SORE THROAT: 0
HEADACHES: 0

## 2022-05-08 ASSESSMENT — ACTIVITIES OF DAILY LIVING (ADL): CURRENT_FUNCTION: NO ASSISTANCE NEEDED

## 2022-05-09 ENCOUNTER — OFFICE VISIT (OUTPATIENT)
Dept: DERMATOLOGY | Facility: CLINIC | Age: 77
End: 2022-05-09
Payer: COMMERCIAL

## 2022-05-09 VITALS — SYSTOLIC BLOOD PRESSURE: 156 MMHG | DIASTOLIC BLOOD PRESSURE: 72 MMHG

## 2022-05-09 DIAGNOSIS — L81.4 LENTIGINES: ICD-10-CM

## 2022-05-09 DIAGNOSIS — L82.1 SEBORRHEIC KERATOSES: ICD-10-CM

## 2022-05-09 DIAGNOSIS — L72.0 EIC (EPIDERMAL INCLUSION CYST): ICD-10-CM

## 2022-05-09 DIAGNOSIS — Z85.828 HISTORY OF BASAL CELL CARCINOMA (BCC): ICD-10-CM

## 2022-05-09 DIAGNOSIS — D18.01 CHERRY ANGIOMA: Primary | ICD-10-CM

## 2022-05-09 DIAGNOSIS — D22.9 MULTIPLE BENIGN NEVI: ICD-10-CM

## 2022-05-09 PROCEDURE — 99203 OFFICE O/P NEW LOW 30 MIN: CPT | Performed by: PHYSICIAN ASSISTANT

## 2022-05-09 NOTE — PATIENT INSTRUCTIONS
Proper skin care from [facility]:    -Eliminate harsh soaps as they strip the natural oils from the skin, often resulting in dry itchy skin ( i.e. Dial, Zest, [place])  -Use mild soaps such as Cetaphil or Dove Sensitive Skin in the shower. You do not need to use soap on arms, legs, and trunk every time you shower unless visibly soiled.   -Avoid hot or cold showers.  -After showering, lightly dry off and apply moisturizing within 2-3 minutes. This will help trap moisture in the skin.   -Aggressive use of a moisturizer at least 1-2 times a day to the entire body (including -Vanicream, Cetaphil, Aquaphor or Cerave) and moisturize hands after every washing.  -We recommend using moisturizers that come in a tub that needs to be scooped out, not a pump. This has more of an oil base. It will hold moisture in your skin much better than a water base moisturizer. The above recommended are non-pore clogging.      Wear a sunscreen with at least SPF 30 on your face, ears, neck and V of the chest daily. Wear sunscreen on other areas of the body if those areas are exposed to the sun throughout the day. Sunscreens can contain physical and/or chemical blockers. Physical blockers are less likely to clog pores, these include zinc oxide and titanium dioxide. Reapply every two hour and after swimming.     Sunscreen examples: https://www.ewg.org/sunscreen/    UV radiation  UVA radiation remains constant throughout the day and throughout the year. It is a longer wavelength than UVB and therefore penetrates deeper into the skin leading to immediate and delayed tanning, photoaging, and skin cancer. 70-80% of UVA and UVB radiation occurs between the hours of 10am-2pm.  UVB radiation  UVB radiation causes the most harmful effects and is more significant during the summer months. However, snow and ice can reflect UVB radiation leading to skin damage during the winter months as well. UVB radiation is responsible for tanning,  burning, inflammation, delayed erythema (pinkness), pigmentation (brown spots), and skin cancer.     I recommend self monthly full body exams and yearly full body exams with a dermatology provider. If you develop a new or changing lesion please follow up for examination. Most skin cancers are pink and scaly or pink and pearly. However, we do see blue/brown/black skin cancers.  Consider the ABCDEs of melanoma when giving yourself your monthly full body exam ( don't forget the groin, buttocks, feet, toes, etc). A-asymmetry, B-borders, C-color, D-diameter, E-elevation or evolving. If you see any of these changes please follow up in clinic. If you cannot see your back I recommend purchasing a hand held mirror to use with a larger wall mirror.

## 2022-05-09 NOTE — LETTER
5/9/2022         RE: Guillermo Hurst  [address] [address] [id number]        Dear Colleague,    Thank you for referring your patient, Guillermo Hurst, to the [facility]. Please see a copy of my visit note below.    HPI:  I was asked to see pt by DR. Tuttle. Guillermo Hurst is a 76 year old male patient here today for spot on neck .  Patient states this has been present for a while.  Patient reports the following symptoms: changing size, drains .  Patient reports the following previous treatments: none.  Patient reports the following modifying factors: none.  Associated symptoms: none.  Patient has no other skin complaints today.  Remainder of the HPI, Meds, PMH, Allergies, FH, and SH was reviewed in chart.    Pertinent Hx:   BCC  Past Medical History:   Diagnosis Date     Essential hypertension, benign     Hypertension, Benign     LBBB (left bundle branch block)      Nephritis and nephropathy, not specified as acute or chronic, with unspecified pathological lesion in kidney      Vertigo     has had multiple ear surgeries- told due to this by ENT     VIRAL PERICARDITIS 1975    resolved       Past Surgical History:   Procedure Laterality Date     ARTHROSCOPY KNEE Right 8/28/2017    Procedure: ARTHROSCOPY KNEE;  Arthroscopic partial medial meniscectomy, right knee;  Surgeon: Russell Crawford MD;  Location:  OR     COLONOSCOPY       COLONOSCOPY N/A 1/24/2019    Procedure: COLONOSCOPY;  Surgeon: Jese Cantu MD;  Location:  GI     ENT SURGERY      Both ears     ZZ BORGES W/O FACETEC FORAMOT/DSKC 1/2 VRT SEG, LUMBAR  1975    laminectomy     Z BORGES W/O FACETEC FORAMOT/DSKC 1/2 VRT SEG, LUMBAR  1985    laminectomy     ZZ NONSPECIFIC PROCEDURE  1983    R ear surgery due to recurrent infections     ZZ NONSPECIFIC PROCEDURE  2002    LAMINECTOMY AND SPINAL FUSION , FLEXIBLE STRUT , 3 LEVELS        Family History   Problem Relation Age of Onset     Cardiovascular  Brother         b:[birth year]28  hx bypass, HTN     C.A.D. Brother      Coronary Artery Disease Brother      Hypertension Brother      Respiratory Sister         b:1932  asthma and allergies     Diabetes Sister      Cardiovascular Sister         pacemaker and bypass surgery     Coronary Artery Disease Sister      Hypertension Sister      Asthma Sister      Circulatory Sister         b:1936 Raynaud's phenomenon     Cancer Brother         d:age 55  hx esophogeal then liver cancer.     Alcohol/Drug Brother         alcoholic - sober at time of death     Obesity Brother      C.A.D. Brother      Hypertension Brother      Chemical Addiction Brother      Cancer Brother         brain cancer     C.A.D. Brother      Family History Negative Sister         b:     Circulatory Mother         b:8  hx of L leg amputation due to circulatory problems in , HTN     Hypertension Mother      Cardiovascular Father         d:age 53 after second MI     Alcohol/Drug Father         alcoholic, sober 5 yrs prior to death     Coronary Artery Disease Father              Cancer Paternal Grandmother         lung cancer       Social History     Socioeconomic History     Marital status:      Spouse name: Not on file     Number of children: 3     Years of education: Not on file     Highest education level: Not on file   Occupational History     Occupation: [employer] management     Comment: [employer]   Tobacco Use     Smoking status: Former Smoker     Packs/day: 1.00     Years: 20.00     Pack years: 20.00     Types: Cigarettes     Quit date: 1985     Years since quittin.7     Smokeless tobacco: Never Used   Substance and Sexual Activity     Alcohol use: Yes     Comment: Avg (1.5 per day, wine, beer or cocktail)     Drug use: No     Sexual activity: Yes     Partners: Female     Birth control/protection: Surgical   Other Topics Concern     Parent/sibling w/ CABG, MI or angioplasty before 65F 55M? Yes   Social History Narrative      Not on file     Social Determinants of Health     Financial Resource Strain: Not on file   Food Insecurity: Not on file   Transportation Needs: Not on file   Physical Activity: Not on file   Stress: Not on file   Social Connections: Not on file   Intimate Partner Violence: Not on file   Housing Stability: Not on file       Outpatient Encounter Medications as of 5/9/2022   Medication Sig Dispense Refill     diclofenac (VOLTAREN) 1 % topical gel Place 4 g onto the skin 4 times daily 1 Tube 11     lisinopril (ZESTRIL) 20 MG tablet Take 1 tablet (20 mg) by mouth daily 90 tablet 0     sildenafil (VIAGRA) 50 MG tablet Take 1-2 tablets ( mg) by mouth daily as needed (ED) 15 tablet 8     simvastatin (ZOCOR) 20 MG tablet Take 1 tablet (20 mg) by mouth At Bedtime 90 tablet 0     No facility-administered encounter medications on file as of 5/9/2022.       Review Of Systems:  Skin: spot  Eyes: negative  Ears/Nose/Throat: negative  Respiratory: No shortness of breath, dyspnea on exertion, cough, or hemoptysis  Cardiovascular: negative  Gastrointestinal: negative  Genitourinary: negative  Musculoskeletal: negative  Neurologic: negative  Psychiatric: negative  Hematologic/Lymphatic/Immunologic: negative  Endocrine: negative      Objective:     BP (!) 156/72   Eyes: Conjunctivae/lids: Normal   ENT: Lips:  Normal  MSK: Normal  Cardiovascular: Peripheral edema none  Pulm: Breathing Normal  Neuro/Psych: Orientation: A/O x 3. Normal; Mood/Affect: Normal, NAD, WDWN  Pt accompanied by: self  Following areas examined: Scalp, face, eyelids, lips, neck, chest, abdomen, back, R&L upper and lower extremities, buttock, hips.  Pt defers exam of  groin and genitals.   Evangelista skin type:ii   Findings:  Red smooth well-defined macules on trunk and extremities.  Brown, stuck-on scaly appearing papules on left leg and back, trunk and extremities.  Well circumscribed macules with symmetric color distribution on trunk and  extremities.  Graham WD smooth macules on face, neck, trunk, and extremities.   Soft mobile smooth nodule on post neck 0.4cm    Assessment and Plan:     1) Cherry angiomas, Seborrheic keratoses, Benign nevi, Lentigines     I discussed the specifics of tumor, prognosis, and genetics of benign lesions.  I explained that treatment of these lesions would be purely cosmetic and not medically neccessary.  I discussed with patient different removal options including excision, cryotherapy, cautery and /or laser.  Lesion may recur and/or may not completely resolve. May need additional treatment.     2) EIC  Disc punch biopsy today vs w/m-pt defers tx. Not bothersome  3) history of BCC  Right preauricular-no evidence of recurrence  Reviewed pertinent charts and labs prior to office visit.   Signs and Symptoms of non-melanoma skin cancer and ABCDEs of melanoma reviewed with patient. Patient encouraged to perform monthly self skin exams and educated on how to perform them. UV precautions reviewed with patient. Patient was asked about new or changing moles/lesions on body.   Wear a sunscreen with at least SPF 30 on your face, ears, neck and V of the chest daily. Wear sunscreen on other areas of the body if those areas are exposed to the sun throughout the day. Sunscreens can contain physical and/or chemical blockers. Physical blockers are less likely to clog pores, these include zinc oxide and titanium dioxide. Reapply every two hour and after swimming.Sunscreen examples: https://www.ewg.org/sunscreen/    Proper skin care from [facility]:    -Eliminate harsh soaps as they strip the natural oils from the skin, often resulting in dry itchy skin ( i.e. Dial, Zest, [place])  -Use mild soaps such as Cetaphil or Dove Sensitive Skin in the shower. You do not need to use soap on arms, legs, and trunk every time you shower unless visibly soiled.   -Avoid hot or cold showers.  -After showering, lightly dry off and apply  moisturizing within 2-3 minutes. This will help trap moisture in the skin.   -Aggressive use of a moisturizer at least 1-2 times a day to the entire body (including -Vanicream, Cetaphil, Aquaphor or Cerave) and moisturize hands after every washing.  -We recommend using moisturizers that come in a tub that needs to be scooped out, not a pump. This has more of an oil base. It will hold moisture in your skin much better than a water base moisturizer. The above recommended are non-pore clogging.         It was a pleasure speaking with Guillermo Hurst today.       Follow up in yearly FBE          Again, thank you for allowing me to participate in the care of your patient.        Sincerely,        Virginia Moody PA-C

## 2022-05-09 NOTE — PROGRESS NOTES
HPI:  I was asked to see pt by DR. Tuttle. Guillermo Hurst is a 76 year old male patient here today for spot on neck .  Patient states this has been present for a while.  Patient reports the following symptoms: changing size, drains .  Patient reports the following previous treatments: none.  Patient reports the following modifying factors: none.  Associated symptoms: none.  Patient has no other skin complaints today.  Remainder of the HPI, Meds, PMH, Allergies, FH, and SH was reviewed in chart.    Pertinent Hx:   BCC  Past Medical History:   Diagnosis Date     Essential hypertension, benign     Hypertension, Benign     LBBB (left bundle branch block)      Nephritis and nephropathy, not specified as acute or chronic, with unspecified pathological lesion in kidney      Vertigo     has had multiple ear surgeries- told due to this by ENT     VIRAL PERICARDITIS 1975    resolved       Past Surgical History:   Procedure Laterality Date     ARTHROSCOPY KNEE Right 8/28/2017    Procedure: ARTHROSCOPY KNEE;  Arthroscopic partial medial meniscectomy, right knee;  Surgeon: Russell Crawford MD;  Location:  OR     COLONOSCOPY       COLONOSCOPY N/A 1/24/2019    Procedure: COLONOSCOPY;  Surgeon: Jese Cantu MD;  Location:  GI     ENT SURGERY      Both ears     ZZC BORGES W/O FACETEC FORAMOT/DSKC 1/2 VRT SEG, LUMBAR  1975    laminectomy     ZZC BORGES W/O FACETEC FORAMOT/DSKC 1/2 VRT SEG, LUMBAR  1985    laminectomy     Z NONSPECIFIC PROCEDURE  1983    R ear surgery due to recurrent infections     Z NONSPECIFIC PROCEDURE  2002    LAMINECTOMY AND SPINAL FUSION , FLEXIBLE STRUT , 3 LEVELS        Family History   Problem Relation Age of Onset     Cardiovascular Brother         b:1929  hx bypass, HTN     C.A.D. Brother      Coronary Artery Disease Brother      Hypertension Brother      Respiratory Sister         b:1932  asthma and allergies     Diabetes Sister      Cardiovascular Sister         pacemaker and bypass  surgery     Coronary Artery Disease Sister      Hypertension Sister      Asthma Sister      Circulatory Sister         b:1936 Raynaud's phenomenon     Cancer Brother         d:age 55  hx esophogeal then liver cancer.     Alcohol/Drug Brother         alcoholic - sober at time of death     Obesity Brother      C.A.D. Brother      Hypertension Brother      Chemical Addiction Brother      Cancer Brother         brain cancer     C.A.D. Brother      Family History Negative Sister         b:     Circulatory Mother         b:[birth year]08  hx of L leg amputation due to circulatory problems in , HTN     Hypertension Mother      Cardiovascular Father         d:age 53 after second MI     Alcohol/Drug Father         alcoholic, sober 5 yrs prior to death     Coronary Artery Disease Father              Cancer Paternal Grandmother         lung cancer       Social History     Socioeconomic History     Marital status:      Spouse name: Not on file     Number of children: 3     Years of education: Not on file     Highest education level: Not on file   Occupational History     Occupation: [employer]     Comment: [employer]   Tobacco Use     Smoking status: Former Smoker     Packs/day: 1.00     Years: 20.00     Pack years: 20.00     Types: Cigarettes     Quit date: 1985     Years since quittin.7     Smokeless tobacco: Never Used   Substance and Sexual Activity     Alcohol use: Yes     Comment: Avg (1.5 per day, wine, beer or cocktail)     Drug use: No     Sexual activity: Yes     Partners: Female     Birth control/protection: Surgical   Other Topics Concern     Parent/sibling w/ CABG, MI or angioplasty before 65F 55M? Yes   Social History Narrative     Not on file     Social Determinants of Health     Financial Resource Strain: Not on file   Food Insecurity: Not on file   Transportation Needs: Not on file   Physical Activity: Not on file   Stress: Not on file   Social Connections: Not on file    Intimate Partner Violence: Not on file   Housing Stability: Not on file       Outpatient Encounter Medications as of 5/9/2022   Medication Sig Dispense Refill     diclofenac (VOLTAREN) 1 % topical gel Place 4 g onto the skin 4 times daily 1 Tube 11     lisinopril (ZESTRIL) 20 MG tablet Take 1 tablet (20 mg) by mouth daily 90 tablet 0     sildenafil (VIAGRA) 50 MG tablet Take 1-2 tablets ( mg) by mouth daily as needed (ED) 15 tablet 8     simvastatin (ZOCOR) 20 MG tablet Take 1 tablet (20 mg) by mouth At Bedtime 90 tablet 0     No facility-administered encounter medications on file as of 5/9/2022.       Review Of Systems:  Skin: spot  Eyes: negative  Ears/Nose/Throat: negative  Respiratory: No shortness of breath, dyspnea on exertion, cough, or hemoptysis  Cardiovascular: negative  Gastrointestinal: negative  Genitourinary: negative  Musculoskeletal: negative  Neurologic: negative  Psychiatric: negative  Hematologic/Lymphatic/Immunologic: negative  Endocrine: negative      Objective:     BP (!) 156/72   Eyes: Conjunctivae/lids: Normal   ENT: Lips:  Normal  MSK: Normal  Cardiovascular: Peripheral edema none  Pulm: Breathing Normal  Neuro/Psych: Orientation: A/O x 3. Normal; Mood/Affect: Normal, NAD, WDWN  Pt accompanied by: self  Following areas examined: Scalp, face, eyelids, lips, neck, chest, abdomen, back, R&L upper and lower extremities, buttock, hips.  Pt defers exam of  groin and genitals.   Evangelista skin type:ii   Findings:  Red smooth well-defined macules on trunk and extremities.  Brown, stuck-on scaly appearing papules on left leg and back, trunk and extremities.  Well circumscribed macules with symmetric color distribution on trunk and extremities.  Tan WD smooth macules on face, neck, trunk, and extremities.   Soft mobile smooth nodule on post neck 0.4cm    Assessment and Plan:     1) Cherry angiomas, Seborrheic keratoses, Benign nevi, Lentigines     I discussed the specifics of tumor,  prognosis, and genetics of benign lesions.  I explained that treatment of these lesions would be purely cosmetic and not medically neccessary.  I discussed with patient different removal options including excision, cryotherapy, cautery and /or laser.  Lesion may recur and/or may not completely resolve. May need additional treatment.     2) EIC  Disc punch biopsy today vs w/m-pt defers tx. Not bothersome  3) history of BCC  Right preauricular-no evidence of recurrence  Reviewed pertinent charts and labs prior to office visit.   Signs and Symptoms of non-melanoma skin cancer and ABCDEs of melanoma reviewed with patient. Patient encouraged to perform monthly self skin exams and educated on how to perform them. UV precautions reviewed with patient. Patient was asked about new or changing moles/lesions on body.   Wear a sunscreen with at least SPF 30 on your face, ears, neck and V of the chest daily. Wear sunscreen on other areas of the body if those areas are exposed to the sun throughout the day. Sunscreens can contain physical and/or chemical blockers. Physical blockers are less likely to clog pores, these include zinc oxide and titanium dioxide. Reapply every two hour and after swimming.Sunscreen examples: https://www.ewg.org/sunscreen/    Proper skin care from [facility]:    -Eliminate harsh soaps as they strip the natural oils from the skin, often resulting in dry itchy skin ( i.e. Dial, Zest, [place])  -Use mild soaps such as Cetaphil or Dove Sensitive Skin in the shower. You do not need to use soap on arms, legs, and trunk every time you shower unless visibly soiled.   -Avoid hot or cold showers.  -After showering, lightly dry off and apply moisturizing within 2-3 minutes. This will help trap moisture in the skin.   -Aggressive use of a moisturizer at least 1-2 times a day to the entire body (including -Vanicream, Cetaphil, Aquaphor or Cerave) and moisturize hands after every washing.  -We  recommend using moisturizers that come in a tub that needs to be scooped out, not a pump. This has more of an oil base. It will hold moisture in your skin much better than a water base moisturizer. The above recommended are non-pore clogging.         It was a pleasure speaking with Guillermo Hurst today.       Follow up in yearly FBE

## 2022-05-10 ENCOUNTER — OFFICE VISIT (OUTPATIENT)
Dept: INTERNAL MEDICINE | Facility: CLINIC | Age: 77
End: 2022-05-10
Payer: COMMERCIAL

## 2022-05-10 VITALS
HEART RATE: 91 BPM | DIASTOLIC BLOOD PRESSURE: 70 MMHG | SYSTOLIC BLOOD PRESSURE: 136 MMHG | WEIGHT: 206 LBS | OXYGEN SATURATION: 96 % | BODY MASS INDEX: 28.84 KG/M2 | HEIGHT: 71 IN | TEMPERATURE: 98.6 F

## 2022-05-10 DIAGNOSIS — I10 ESSENTIAL HYPERTENSION, BENIGN: ICD-10-CM

## 2022-05-10 DIAGNOSIS — E78.5 HYPERLIPIDEMIA LDL GOAL <100: ICD-10-CM

## 2022-05-10 DIAGNOSIS — Z00.00 ENCOUNTER FOR PREVENTATIVE ADULT HEALTH CARE EXAMINATION: Primary | ICD-10-CM

## 2022-05-10 DIAGNOSIS — M25.552 HIP PAIN, BILATERAL: ICD-10-CM

## 2022-05-10 DIAGNOSIS — Z13.220 SCREENING FOR HYPERLIPIDEMIA: ICD-10-CM

## 2022-05-10 DIAGNOSIS — M54.50 BILATERAL LOW BACK PAIN WITHOUT SCIATICA, UNSPECIFIED CHRONICITY: ICD-10-CM

## 2022-05-10 DIAGNOSIS — N52.9 ERECTILE DYSFUNCTION, UNSPECIFIED ERECTILE DYSFUNCTION TYPE: ICD-10-CM

## 2022-05-10 DIAGNOSIS — Z12.5 SCREENING FOR PROSTATE CANCER: ICD-10-CM

## 2022-05-10 DIAGNOSIS — M25.551 HIP PAIN, BILATERAL: ICD-10-CM

## 2022-05-10 LAB
ALBUMIN UR-MCNC: NEGATIVE MG/DL
APPEARANCE UR: CLEAR
BILIRUB UR QL STRIP: NEGATIVE
COLOR UR AUTO: YELLOW
ERYTHROCYTE [DISTWIDTH] IN BLOOD BY AUTOMATED COUNT: 13.1 % (ref 10–15)
GLUCOSE UR STRIP-MCNC: NEGATIVE MG/DL
HCT VFR BLD AUTO: 45.9 % (ref 40–53)
HGB BLD-MCNC: 15.2 G/DL (ref 13.3–17.7)
HGB UR QL STRIP: ABNORMAL
KETONES UR STRIP-MCNC: NEGATIVE MG/DL
LEUKOCYTE ESTERASE UR QL STRIP: NEGATIVE
MCH RBC QN AUTO: 30.6 PG (ref 26.5–33)
MCHC RBC AUTO-ENTMCNC: 33.1 G/DL (ref 31.5–36.5)
MCV RBC AUTO: 92 FL (ref 78–100)
NITRATE UR QL: NEGATIVE
PH UR STRIP: 5.5 [PH] (ref 5–7)
PLATELET # BLD AUTO: 220 10E3/UL (ref 150–450)
RBC # BLD AUTO: 4.97 10E6/UL (ref 4.4–5.9)
RBC #/AREA URNS AUTO: NORMAL /HPF
SP GR UR STRIP: 1.02 (ref 1–1.03)
UROBILINOGEN UR STRIP-ACNC: 0.2 E.U./DL
WBC # BLD AUTO: 5.4 10E3/UL (ref 4–11)
WBC #/AREA URNS AUTO: NORMAL /HPF

## 2022-05-10 PROCEDURE — 81001 URINALYSIS AUTO W/SCOPE: CPT | Performed by: INTERNAL MEDICINE

## 2022-05-10 PROCEDURE — 99397 PER PM REEVAL EST PAT 65+ YR: CPT | Performed by: INTERNAL MEDICINE

## 2022-05-10 PROCEDURE — 99213 OFFICE O/P EST LOW 20 MIN: CPT | Mod: 25 | Performed by: INTERNAL MEDICINE

## 2022-05-10 PROCEDURE — 85027 COMPLETE CBC AUTOMATED: CPT | Performed by: INTERNAL MEDICINE

## 2022-05-10 PROCEDURE — 80053 COMPREHEN METABOLIC PANEL: CPT | Performed by: INTERNAL MEDICINE

## 2022-05-10 PROCEDURE — 36415 COLL VENOUS BLD VENIPUNCTURE: CPT | Performed by: INTERNAL MEDICINE

## 2022-05-10 PROCEDURE — 80061 LIPID PANEL: CPT | Performed by: INTERNAL MEDICINE

## 2022-05-10 PROCEDURE — G0103 PSA SCREENING: HCPCS | Performed by: INTERNAL MEDICINE

## 2022-05-10 RX ORDER — SILDENAFIL 50 MG/1
50-100 TABLET, FILM COATED ORAL DAILY PRN
Qty: 15 TABLET | Refills: 8 | Status: SHIPPED | OUTPATIENT
Start: 2022-05-10 | End: 2023-08-01

## 2022-05-10 ASSESSMENT — ENCOUNTER SYMPTOMS
FEVER: 0
HEARTBURN: 0
DIARRHEA: 0
NERVOUS/ANXIOUS: 0
HEMATURIA: 0
DIZZINESS: 0
COUGH: 0
CONSTIPATION: 0
HEMATOCHEZIA: 0
SORE THROAT: 0
SHORTNESS OF BREATH: 0
JOINT SWELLING: 0
DYSURIA: 0
ARTHRALGIAS: 1
NAUSEA: 0
CHILLS: 0
WEAKNESS: 0
MYALGIAS: 1
PALPITATIONS: 0
FREQUENCY: 0
PARESTHESIAS: 0
EYE PAIN: 0
ABDOMINAL PAIN: 0
HEADACHES: 0

## 2022-05-10 ASSESSMENT — ACTIVITIES OF DAILY LIVING (ADL): CURRENT_FUNCTION: NO ASSISTANCE NEEDED

## 2022-05-10 NOTE — PROGRESS NOTES
"SUBJECTIVE:   Guillermo Hurst is a 76 year old male who presents for Preventive Visit.      Patient has been advised of split billing requirements and indicates understanding: Yes  Are you in the first 12 months of your Medicare coverage?  No    Healthy Habits:     In general, how would you rate your overall health?  Excellent    Frequency of exercise:  2-3 days/week    Duration of exercise:  30-45 minutes    Do you usually eat at least 4 servings of fruit and vegetables a day, include whole grains    & fiber and avoid regularly eating high fat or \"junk\" foods?  No    Taking medications regularly:  Yes    Medication side effects:  Not applicable    Ability to successfully perform activities of daily living:  No assistance needed    Home Safety:  Lack of grab bars in the bathroom    Hearing Impairment:  No hearing concerns    In the past 6 months, have you been bothered by leaking of urine?  No    In general, how would you rate your overall mental or emotional health?  Excellent      PHQ-2 Total Score: 0    Additional concerns today:  No    Do you feel safe in your environment? Yes    Have you ever done Advance Care Planning? (For example, a Health Directive, POLST, or a discussion with a medical provider or your loved ones about your wishes): Yes, patient states has an Advance Care Planning document and will bring a copy to the clinic.       Fall risk  Fallen 2 or more times in the past year?: No  Any fall with injury in the past year?: No    Cognitive Screening   1) Repeat 3 items (Leader, Season, Table)    2) Clock draw: NORMAL  3) 3 item recall: Recalls 3 objects  Results: 3 items recalled: COGNITIVE IMPAIRMENT LESS LIKELY    Mini-CogTM Copyright RUDY Lopez. Licensed by the author for use in [facility]; reprinted with permission (everett@.[facility] [place]). All rights reserved.      Do you have sleep apnea, excessive snoring or daytime drowsiness?: no    Reviewed and updated as needed this visit by clinical staff   " Tobacco  Allergies  Meds  Problems  Med Hx  Surg Hx  Fam Hx  Soc   Hx          Reviewed and updated as needed this visit by Provider   Tobacco  Allergies  Meds  Problems  Med Hx  Surg Hx  Fam Hx           Social History     Tobacco Use     Smoking status: Former Smoker     Packs/day: 1.00     Years: 20.00     Pack years: 20.00     Types: Cigarettes     Quit date: 1985     Years since quittin.7     Smokeless tobacco: Never Used   Substance Use Topics     Alcohol use: Yes     Comment: Avg (1.5 per day, wine, beer or cocktail)     If you drink alcohol do you typically have >3 drinks per day or >7 drinks per week? No    Alcohol Use 5/10/2022   Prescreen: >3 drinks/day or >7 drinks/week? -   Prescreen: >3 drinks/day or >7 drinks/week? No           PROBLEMS TO ADD ON...  Has h/o HTN. on medical treatment. BP has been controlled. No side effects from medications. No CP, HA, dizziness. good compliance with medications and low salt diet.  Has H/O hyperlipidemia. On medical treatment and diet. No side effects. No muscle weakness, myalgias or upset stomach.   Has h/o ED, uses PRN Viagra. No side effects.   Has h/o LS spine DDD, post 3 surgeries in the past. Has had increased pain in the lateral hips past year, unable to walk more than a mile or to stand up for longer.     Current providers sharing in care for this patient include:   Patient Care Team:  Juliocesar Tuttle MD as PCP - General  Juliocesar Tuttle MD as Assigned PCP  Virginia Moody PA-C as Physician Assistant (Dermatology)    The following health maintenance items are reviewed in Epic and correct as of today:  Health Maintenance Due   Topic Date Due     ZOSTER IMMUNIZATION (1 of 2) Never done     COVID-19 Vaccine (3 - Booster for Pfizer series) 08/10/2021     BMP  2022     LIPID  2022     FALL RISK ASSESSMENT  2022     Lab work is in process  Labs reviewed in EPIC          Review of Systems   Constitutional:  "Negative for chills and fever.   HENT: Positive for hearing loss. Negative for congestion, ear pain and sore throat.    Eyes: Negative for pain and visual disturbance.   Respiratory: Negative for cough and shortness of breath.    Cardiovascular: Positive for peripheral edema. Negative for chest pain and palpitations.   Gastrointestinal: Negative for abdominal pain, constipation, diarrhea, heartburn, hematochezia and nausea.   Genitourinary: Positive for impotence. Negative for dysuria, frequency, genital sores, hematuria, penile discharge and urgency.   Musculoskeletal: Positive for arthralgias and myalgias. Negative for joint swelling.   Skin: Negative for rash.   Neurological: Negative for dizziness, weakness, headaches and paresthesias.   Psychiatric/Behavioral: Negative for mood changes. The patient is not nervous/anxious.          OBJECTIVE:   /70 (BP Location: Right arm, Patient Position: Sitting, Cuff Size: Adult Large)   Pulse 91   Temp 98.6  F (37  C) (Oral)   Ht 1.803 m (5' 11\")   Wt 93.4 kg (206 lb)   SpO2 96%   BMI 28.73 kg/m   Estimated body mass index is 28.73 kg/m  as calculated from the following:    Height as of this encounter: 1.803 m (5' 11\").    Weight as of this encounter: 93.4 kg (206 lb).  Physical Exam  GENERAL: healthy, alert and no distress  EYES: Eyes grossly normal to inspection, PERRL and conjunctivae and sclerae normal  HENT: ear canals and TM's normal, nose and mouth without ulcers or lesions  NECK: no adenopathy, no asymmetry, masses, or scars and thyroid normal to palpation  RESP: lungs clear to auscultation - no rales, rhonchi or wheezes  CV: regular rate and rhythm, normal S1 S2, no S3 or S4, no murmur, click or rub, no peripheral edema and peripheral pulses strong  ABDOMEN: soft, nontender, no hepatosplenomegaly, no masses and bowel sounds normal  MS: no gross musculoskeletal defects noted, no edema  SKIN: no suspicious lesions or rashes  NEURO: Normal strength and " tone, mentation intact and speech normal, right leg weakness and muscle atrophy   PSYCH: mentation appears normal, affect normal/bright    Diagnostic Test Results:  Labs reviewed in Epic    ASSESSMENT / PLAN:       ICD-10-CM    1. Encounter for preventative adult health care examination  Z00.00 Lipid panel reflex to direct LDL Fasting     Comprehensive metabolic panel (BMP + Alb, Alk Phos, ALT, AST, Total. Bili, TP)     CBC with platelets     PSA, screen     UA with Microscopic reflex to Culture - lab collect   2. Screening for hyperlipidemia  Z13.220    3. Erectile dysfunction, unspecified erectile dysfunction type  N52.9 sildenafil (VIAGRA) 50 MG tablet     OFFICE/OUTPT VISIT,EST,LEVL III   4. Screening for prostate cancer  Z12.5 PSA, screen   5. Essential hypertension, benign  I10 Lipid panel reflex to direct LDL Fasting     Comprehensive metabolic panel (BMP + Alb, Alk Phos, ALT, AST, Total. Bili, TP)     CBC with platelets     UA with Microscopic reflex to Culture - lab collect     OFFICE/OUTPT VISIT,EST,LEVL III   6. Hyperlipidemia LDL goal <100  E78.5 OFFICE/OUTPT VISIT,EST,LEVL III   7. Bilateral low back pain without sciatica, unspecified chronicity  M54.50 XR Lumbar Spine 2/3 Views     OFFICE/OUTPT VISIT,EST,LEVL III   8. Hip pain, bilateral  M25.551 XR Lumbar Spine 2/3 Views    M25.552 XR Hip Left 2-3 Views     XR Hip Right 2-3 Views     OFFICE/OUTPT VISIT,EST,LEVL III   continue treatment   Assess lab work   Assess X rays of the hips and LS spine       Patient has been advised of split billing requirements and indicates understanding: Yes    COUNSELING:  Reviewed preventive health counseling, as reflected in patient instructions       Regular exercise       Healthy diet/nutrition       Vision screening       Hearing screening       Colon cancer screening       Prostate cancer screening    Estimated body mass index is 28.73 kg/m  as calculated from the following:    Height as of this encounter: 1.803 m  "(5' 11\").    Weight as of this encounter: 93.4 kg (206 lb).        He reports that he quit smoking about 36 years ago. His smoking use included cigarettes. He has a 20.00 pack-year smoking history. He has never used smokeless tobacco.      Appropriate preventive services were discussed with this patient, including applicable screening as appropriate for cardiovascular disease, diabetes, osteopenia/osteoporosis, and glaucoma.  As appropriate for age/gender, discussed screening for colorectal cancer, prostate cancer, breast cancer, and cervical cancer. Checklist reviewing preventive services available has been given to the patient.    Reviewed patients plan of care and provided an AVS. The Intermediate Care Plan ( asthma action plan, low back pain action plan, and migraine action plan) for Guillermo meets the Care Plan requirement. This Care Plan has been established and reviewed with the Patient.    Counseling Resources:  ATP IV Guidelines  Pooled Cohorts Equation Calculator  Breast Cancer Risk Calculator  Breast Cancer: Medication to Reduce Risk  FRAX Risk Assessment  ICSI Preventive Guidelines  Dietary Guidelines for Americans, 2010  [employer] My[employer]ate  ASA Prophylaxis  Lung CA Screening    Cameron Kelly MD  [facility]    Identified Health Risks:  "

## 2022-05-11 LAB
ALBUMIN SERPL-MCNC: 4.1 G/DL (ref 3.4–5)
ALP SERPL-CCNC: 65 U/L (ref 40–150)
ALT SERPL W P-5'-P-CCNC: 34 U/L (ref 0–70)
ANION GAP SERPL CALCULATED.3IONS-SCNC: 5 MMOL/L (ref 3–14)
AST SERPL W P-5'-P-CCNC: 27 U/L (ref 0–45)
BILIRUB SERPL-MCNC: 0.6 MG/DL (ref 0.2–1.3)
BUN SERPL-MCNC: 19 MG/DL (ref 7–30)
CALCIUM SERPL-MCNC: 9.2 MG/DL (ref 8.5–10.1)
CHLORIDE BLD-SCNC: 110 MMOL/L (ref 94–109)
CHOLEST SERPL-MCNC: 146 MG/DL
CO2 SERPL-SCNC: 25 MMOL/L (ref 20–32)
CREAT SERPL-MCNC: 1.11 MG/DL (ref 0.66–1.25)
FASTING STATUS PATIENT QL REPORTED: YES
GFR SERPL CREATININE-BSD FRML MDRD: 68 ML/MIN/1.73M2
GLUCOSE BLD-MCNC: 101 MG/DL (ref 70–99)
HDLC SERPL-MCNC: 60 MG/DL
LDLC SERPL CALC-MCNC: 70 MG/DL
NONHDLC SERPL-MCNC: 86 MG/DL
POTASSIUM BLD-SCNC: 4.8 MMOL/L (ref 3.4–5.3)
PROT SERPL-MCNC: 7.6 G/DL (ref 6.8–8.8)
PSA SERPL-MCNC: 0.86 UG/L (ref 0–4)
SODIUM SERPL-SCNC: 140 MMOL/L (ref 133–144)
TRIGL SERPL-MCNC: 80 MG/DL

## 2022-05-19 ENCOUNTER — OFFICE VISIT (OUTPATIENT)
Dept: NEUROSURGERY | Facility: CLINIC | Age: 77
End: 2022-05-19
Attending: PHYSICIAN ASSISTANT
Payer: COMMERCIAL

## 2022-05-19 ENCOUNTER — ANCILLARY PROCEDURE (OUTPATIENT)
Dept: GENERAL RADIOLOGY | Facility: CLINIC | Age: 77
End: 2022-05-19
Attending: PHYSICIAN ASSISTANT
Payer: COMMERCIAL

## 2022-05-19 VITALS
SYSTOLIC BLOOD PRESSURE: 166 MMHG | OXYGEN SATURATION: 97 % | BODY MASS INDEX: 28.84 KG/M2 | WEIGHT: 206 LBS | HEART RATE: 70 BPM | HEIGHT: 71 IN | DIASTOLIC BLOOD PRESSURE: 88 MMHG

## 2022-05-19 DIAGNOSIS — M25.552 HIP PAIN, BILATERAL: ICD-10-CM

## 2022-05-19 DIAGNOSIS — M25.551 HIP PAIN, BILATERAL: ICD-10-CM

## 2022-05-19 DIAGNOSIS — M54.40 ACUTE RIGHT-SIDED LOW BACK PAIN WITH SCIATICA, SCIATICA LATERALITY UNSPECIFIED: Primary | ICD-10-CM

## 2022-05-19 DIAGNOSIS — M54.40 ACUTE RIGHT-SIDED LOW BACK PAIN WITH SCIATICA, SCIATICA LATERALITY UNSPECIFIED: ICD-10-CM

## 2022-05-19 PROCEDURE — G0463 HOSPITAL OUTPT CLINIC VISIT: HCPCS

## 2022-05-19 PROCEDURE — 99203 OFFICE O/P NEW LOW 30 MIN: CPT | Performed by: PHYSICIAN ASSISTANT

## 2022-05-19 PROCEDURE — 72100 X-RAY EXAM L-S SPINE 2/3 VWS: CPT | Mod: TC | Performed by: RADIOLOGY

## 2022-05-19 ASSESSMENT — PAIN SCALES - GENERAL: PAINLEVEL: MILD PAIN (3)

## 2022-05-19 NOTE — PATIENT INSTRUCTIONS
-Order placed for lumbar MR imaging WO. Please call [facility] at [phone] to schedule the date, time and location that is most convenient for you to obtain your imaging. Once the imaging has been completed, please contact my office the next day to review the images as well as treatment options. You can contact my office at [phone].    -Flexion and extension views of your lumbar spine obtained today.     -Next step will be consideration of steroid injection and other conservative measures vs. Surgical intervention.         SIXTO Paula, RORO  [facility] Neurosurgery  [facility]     Tel: [phone]  Fax: [phone]

## 2022-05-19 NOTE — LETTER
5/19/2022         RE: Guillermo Hurst  [address] [facility] [address] [facility] [address]        Dear Colleague,    Thank you for referring your patient, Guillermo Hurst, to the [facility]OS[facility] [place]. Please see a copy of my visit note below.    NEUROSURGERY CLINIC CONSULT NOTE     DATE OF VISIT: 5/19/2022     SUBJECTIVE:     Guillermo Hurst is a pleasant 76 year old male who presents to the clinic today for consultation on lumbar spine pain with bilateral hip pain. He is referred to the Neurosurgery Clinic by Dr. Kelly in Primary Care. Pertinent medical history consist of three lumbar surgeries performed over twenty years ago by Dr. España and Dr. Perdomo to include a L4-5 fusion.    Today, he describes constant, dull, aching pain that initiates in the midline low lumbar region. He really cannot appreciate any radicular symptoms to include paresthesia, numbness or perceived weakness. However, he has had right leg atrophy since 1999 resulting in some weakness. Prolonged walking and standing seem to aggravate the symptoms, while alleviation is obtained by sitting down. No mechanism of injury such as trauma or a fall is associated with the onset of the pain. There are no bowel or bladder changes. He denies saddle anesthesia. He denies changes in gait, instability, or falling episodes.     He has participated in conservative therapies to include physical therapy and NSAID's.       Current Outpatient Medications:      diclofenac (VOLTAREN) 1 % topical gel, Place 4 g onto the skin 4 times daily, Disp: 1 Tube, Rfl: 11     lisinopril (ZESTRIL) 20 MG tablet, Take 1 tablet (20 mg) by mouth daily, Disp: 90 tablet, Rfl: 0     sildenafil (VIAGRA) 50 MG tablet, Take 1-2 tablets ( mg) by mouth daily as needed (ED), Disp: 15 tablet, Rfl: 8     simvastatin (ZOCOR) 20 MG tablet, Take 1 tablet (20 mg) by mouth At Bedtime, Disp: 90 tablet, Rfl: 0     Allergies   Allergen Reactions     Iodine       "\"puffed up\"     Penicillins      rash        Past Medical History:   Diagnosis Date     Essential hypertension, benign     Hypertension, Benign     LBBB (left bundle branch block)      Nephritis and nephropathy, 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     Family History has been reviewed with the patient, there are no pertinent findings to presenting concern.     Past Surgical History:   Procedure Laterality Date     ARTHROSCOPY KNEE Right 2017    Procedure: ARTHROSCOPY KNEE;  Arthroscopic partial medial meniscectomy, right knee;  Surgeon: Russell Crawford MD;  Location:  OR     COLONOSCOPY       COLONOSCOPY N/A 2019    Procedure: COLONOSCOPY;  Surgeon: Jese Cantu MD;  Location:  GI     ENT SURGERY      Both ears     Z BORGES W/O FACETEC FORAMOT/DSKC 1/2 VRT SEG, LUMBAR      laminectomy     ZZC BORGES W/O FACETEC FORAMOT/DSKC 1/2 VRT SEG, LUMBAR      laminectomy     Z NONSPECIFIC PROCEDURE      R ear surgery due to recurrent infections     Z NONSPECIFIC PROCEDURE      LAMINECTOMY AND SPINAL FUSION , FLEXIBLE STRUT , 3 LEVELS        Social History     Tobacco Use     Smoking status: Former Smoker     Packs/day: 1.00     Years: 20.00     Pack years: 20.00     Types: Cigarettes     Quit date: 1985     Years since quittin.8     Smokeless tobacco: Never Used   Substance Use Topics     Alcohol use: Yes     Comment: Avg (1.5 per day, wine, beer or cocktail)     Drug use: No        OBJECTIVE:   BP (!) 166/88   Pulse 70   Ht 5' 11\" (1.803 m)   Wt 206 lb (93.4 kg)   SpO2 97%   BMI 28.73 kg/m     Body mass index is 28.73 kg/m .     Imaging:     None     Exam:     Patient appears comfortable, conversational, and in no " apparent distress.   Head: Normocephalic, without obvious abnormality, atraumatic, no facial asymmetry.   Eyes: conjunctivae/corneas clear. PERRL, EOM's intact.   Throat: lips, mucosa, and tongue normal; teeth and gums normal.   Neck: supple, symmetrical, trachea midline, no adenopathy and thyroid: not enlarged, symmetric, no tenderness/mass/nodules.   Lungs: clear to auscultation bilaterally.   Heart: regular rate and rhythm.   Abdomen: soft, non-tender; bowel sounds normal; no masses, no organomegaly.   Pulses: 2+ and symmetric.   Skin: Skin color, texture, turgor normal. No rashes or lesions.     CN II-XII grossly intact, alert and appropriate with conversation and following commands.   Gait is non-antalgic. Able to tandem walk. Unable to walk on toes and heels without difficulty.   Cervical spine is non tender to palpation. Appropriate range of motion of neck, not concerning for lhermitte's phenomenon.   Bilateral bicep 2/4 and tricep reflexes 1/4. Sensation intact throughout upper extremities.     UE muscle strength  Right  Left    Deltoid  5/5  5/5    Biceps  5/5  5/5    Triceps  5/5  5/5    Hand intrinsics  5/5  5/5    Hand grasp  5/5  5/5    Timmons signs  neg  neg      Lumbar spine is non tender to palpation.  Intact sensation throughout lower extremities.   Bilateral patellar 2/4 and achilles reflex 1/4. Negative for pain with straight leg raise.     LE muscle strength  Right  Left    Iliopsoas (hip flexion)  5/5  5/5    Quad (knee extension)  5/5  5/5    Hamstring (knee flexion)  5/5  5/5    Gastrocnemius (PF)  2/5  5/5    Tibialis Ant. (DF)  5/5  5/5    EHL  2/5  5/5      Negative Babinski bilaterally. Negative for clonus.   Calves are soft and non-tender bilaterally.     ASSESSMENT/PLAN:     Guillermo Hurst is a 76 year old male who presents to the clinic for consultation on constant, dull, aching pain that initiates in the midline low lumbar region. He really cannot appreciate any radicular symptoms to  "include paresthesia, numbness or perceived weakness. However, he has had right leg atrophy since 1999 resulting in some weakness.     Pertinent medical history consist of three lumbar surgeries performed over twenty years ago by Dr. España and Dr. Perdomo to include a L4-5 fusion.     He does not have any imaging to review today. On exam, he is noted to have right leg atrophy, PF, and EHL weakness but appropriate sensation and range of motion. He has attempted conservative management with physical therapy and NSAID's, without resolution of symptoms.     Based on his physical exam, we do feel that it would be in his best interest to obtain a lumbar MRI and lumbar flexion and extension x-rays to further assess our concern for  lumbar stenosis, a bulging / herniated disk or other concerning pathology. Once the images have been obtained he has agreed to call our office back at [phone] to further discuss possible surgical interventions or other conservative therapies.     He is going to hold off on an Orthopedics referral to discuss his advanced hip arthritis.      We also discussed signs of a worsening problem that he should seek being evaluated.         Respectfully,     SIXTO Paula, RORO  [facility] Neurosurgery  [facility]  Tel: [phone]    Exam, imaging, and plan reviewed by Dr. Franks.     Guillermo Hurst is a 76 year old male who presents for:  Chief Complaint   Patient presents with     Consult     Hip pain and bilateral back pain         Initial Vitals:  BP (!) 166/88   Pulse 70   Ht 5' 11\" (1.803 m)   Wt 206 lb (93.4 kg)   SpO2 97%   BMI 28.73 kg/m   Estimated body mass index is 28.73 kg/m  as calculated from the following:    Height as of this encounter: 5' 11\" (1.803 m).    Weight as of this encounter: 206 lb (93.4 kg).. Body surface area is 2.16 meters squared. BP completed using cuff size: large  Mild Pain (3)    Nursing Comments: "     Nicole Morris MA      Again, thank you for allowing me to participate in the care of your patient.        Sincerely,        Taz Molina PA-C

## 2022-05-19 NOTE — PROGRESS NOTES
"NEUROSURGERY CLINIC CONSULT NOTE     DATE OF VISIT: 5/19/2022     SUBJECTIVE:     Guillermo Hurst is a pleasant 76 year old male who presents to the clinic today for consultation on lumbar spine pain with bilateral hip pain. He is referred to the Neurosurgery Clinic by Dr. Kelly in Primary Care. Pertinent medical history consist of three lumbar surgeries performed over twenty years ago by Dr. España and Dr. Perdomo to include a L4-5 fusion.    Today, he describes constant, dull, aching pain that initiates in the midline low lumbar region. He really cannot appreciate any radicular symptoms to include paresthesia, numbness or perceived weakness. However, he has had right leg atrophy since 1999 resulting in some weakness. Prolonged walking and standing seem to aggravate the symptoms, while alleviation is obtained by sitting down. No mechanism of injury such as trauma or a fall is associated with the onset of the pain. There are no bowel or bladder changes. He denies saddle anesthesia. He denies changes in gait, instability, or falling episodes.     He has participated in conservative therapies to include physical therapy and NSAID's.       Current Outpatient Medications:      diclofenac (VOLTAREN) 1 % topical gel, Place 4 g onto the skin 4 times daily, Disp: 1 Tube, Rfl: 11     lisinopril (ZESTRIL) 20 MG tablet, Take 1 tablet (20 mg) by mouth daily, Disp: 90 tablet, Rfl: 0     sildenafil (VIAGRA) 50 MG tablet, Take 1-2 tablets ( mg) by mouth daily as needed (ED), Disp: 15 tablet, Rfl: 8     simvastatin (ZOCOR) 20 MG tablet, Take 1 tablet (20 mg) by mouth At Bedtime, Disp: 90 tablet, Rfl: 0     Allergies   Allergen Reactions     Iodine      \"puffed up\"     Penicillins      rash        Past Medical History:   Diagnosis Date     Essential hypertension, benign     Hypertension, Benign     LBBB (left bundle branch block)      Nephritis and nephropathy, not specified as acute or chronic, with unspecified pathological " "lesion in kidney      Vertigo     has had multiple ear surgeries- told due to this by ENT     VIRAL PERICARDITIS 1975    resolved        ROS: 10 point review of symptoms are negative other than the symptoms noted above in the HPI.     Family History has been reviewed with the patient, there are no pertinent findings to presenting concern.     Past Surgical History:   Procedure Laterality Date     ARTHROSCOPY KNEE Right 2017    Procedure: ARTHROSCOPY KNEE;  Arthroscopic partial medial meniscectomy, right knee;  Surgeon: Russell Crawford MD;  Location: [facility] OR     COLONOSCOPY       COLONOSCOPY N/A 2019    Procedure: COLONOSCOPY;  Surgeon: Jese Cantu MD;  Location:  GI     ENT SURGERY      Both ears     ZZC BORGES W/O FACETEC FORAMOT/DSKC  VRT SEG, LUMBAR      laminectomy     ZZC BORGES W/O FACETEC FORAMOT/DSKC  VRT SEG, LUMBAR      laminectomy     ZC NONSPECIFIC PROCEDURE      R ear surgery due to recurrent infections     Z NONSPECIFIC PROCEDURE      LAMINECTOMY AND SPINAL FUSION , FLEXIBLE STRUT , 3 LEVELS        Social History     Tobacco Use     Smoking status: Former Smoker     Packs/day: 1.00     Years: 20.00     Pack years: 20.00     Types: Cigarettes     Quit date: 1985     Years since quittin.8     Smokeless tobacco: Never Used   Substance Use Topics     Alcohol use: Yes     Comment: Avg (1.5 per day, wine, beer or cocktail)     Drug use: No        OBJECTIVE:   BP (!) 166/88   Pulse 70   Ht 5' 11\" (1.803 m)   Wt 206 lb (93.4 kg)   SpO2 97%   BMI 28.73 kg/m     Body mass index is 28.73 kg/m .     Imaging:     None     Exam:     Patient appears comfortable, conversational, and in no apparent distress.   Head: Normocephalic, without obvious abnormality, atraumatic, no facial asymmetry.   Eyes: conjunctivae/corneas clear. PERRL, EOM's intact.   Throat: lips, mucosa, and tongue normal; teeth and gums normal.   Neck: supple, symmetrical, trachea midline, no " adenopathy and thyroid: not enlarged, symmetric, no tenderness/mass/nodules.   Lungs: clear to auscultation bilaterally.   Heart: regular rate and rhythm.   Abdomen: soft, non-tender; bowel sounds normal; no masses, no organomegaly.   Pulses: 2+ and symmetric.   Skin: Skin color, texture, turgor normal. No rashes or lesions.     CN II-XII grossly intact, alert and appropriate with conversation and following commands.   Gait is non-antalgic. Able to tandem walk. Unable to walk on toes and heels without difficulty.   Cervical spine is non tender to palpation. Appropriate range of motion of neck, not concerning for lhermitte's phenomenon.   Bilateral bicep 2/4 and tricep reflexes 1/4. Sensation intact throughout upper extremities.     UE muscle strength  Right  Left    Deltoid  5/5  5/5    Biceps  5/5  5/5    Triceps  5/5  5/5    Hand intrinsics  5/5  5/5    Hand grasp  5/5  5/5    Timmons signs  neg  neg      Lumbar spine is non tender to palpation.  Intact sensation throughout lower extremities.   Bilateral patellar 2/4 and achilles reflex 1/4. Negative for pain with straight leg raise.     LE muscle strength  Right  Left    Iliopsoas (hip flexion)  5/5  5/5    Quad (knee extension)  5/5  5/5    Hamstring (knee flexion)  5/5  5/5    Gastrocnemius (PF)  2/5  5/5    Tibialis Ant. (DF)  5/5  5/5    EHL  2/5  5/5      Negative Babinski bilaterally. Negative for clonus.   Calves are soft and non-tender bilaterally.     ASSESSMENT/PLAN:     Guillermo Hurst is a 76 year old male who presents to the clinic for consultation on constant, dull, aching pain that initiates in the midline low lumbar region. He really cannot appreciate any radicular symptoms to include paresthesia, numbness or perceived weakness. However, he has had right leg atrophy since 1999 resulting in some weakness.     Pertinent medical history consist of three lumbar surgeries performed over twenty years ago by Dr. España and Dr. Perdomo to include a L4-5  fusion.     He does not have any imaging to review today. On exam, he is noted to have right leg atrophy, PF, and EHL weakness but appropriate sensation and range of motion. He has attempted conservative management with physical therapy and NSAID's, without resolution of symptoms.     Based on his physical exam, we do feel that it would be in his best interest to obtain a lumbar MRI and lumbar flexion and extension x-rays to further assess our concern for  lumbar stenosis, a bulging / herniated disk or other concerning pathology. Once the images have been obtained he has agreed to call our office back at [phone] to further discuss possible surgical interventions or other conservative therapies.     He is going to hold off on an Orthopedics referral to discuss his advanced hip arthritis.      We also discussed signs of a worsening problem that he should seek being evaluated.         Respectfully,     SIXTO Paula, PAJAYLA  [facility] Neurosurgery  [facility]  Tel: [phone]    Exam, imaging, and plan reviewed by Dr. Franks.

## 2022-05-19 NOTE — PROGRESS NOTES
"Guillermo Hurst is a 76 year old male who presents for:  Chief Complaint   Patient presents with     Consult     Hip pain and bilateral back pain         Initial Vitals:  BP (!) 166/88   Pulse 70   Ht 5' 11\" (1.803 m)   Wt 206 lb (93.4 kg)   SpO2 97%   BMI 28.73 kg/m   Estimated body mass index is 28.73 kg/m  as calculated from the following:    Height as of this encounter: 5' 11\" (1.803 m).    Weight as of this encounter: 206 lb (93.4 kg).. Body surface area is 2.16 meters squared. BP completed using cuff size: large  Mild Pain (3)    Nursing Comments:     Nicole Morris MA  "

## 2022-05-20 ENCOUNTER — HOSPITAL ENCOUNTER (OUTPATIENT)
Dept: MRI IMAGING | Facility: CLINIC | Age: 77
Discharge: HOME | End: 2022-05-20
Attending: PHYSICIAN ASSISTANT | Admitting: PHYSICIAN ASSISTANT
Payer: COMMERCIAL

## 2022-05-20 DIAGNOSIS — M54.40 ACUTE RIGHT-SIDED LOW BACK PAIN WITH SCIATICA, SCIATICA LATERALITY UNSPECIFIED: ICD-10-CM

## 2022-05-20 DIAGNOSIS — M25.552 HIP PAIN, BILATERAL: ICD-10-CM

## 2022-05-20 DIAGNOSIS — M25.551 HIP PAIN, BILATERAL: ICD-10-CM

## 2022-05-20 PROCEDURE — 255N000002 HC RX 255 OP 636: Performed by: PHYSICIAN ASSISTANT

## 2022-05-20 PROCEDURE — A9585 GADOBUTROL INJECTION: HCPCS | Performed by: PHYSICIAN ASSISTANT

## 2022-05-20 PROCEDURE — 72158 MRI LUMBAR SPINE W/O & W/DYE: CPT

## 2022-05-20 RX ORDER — GADOBUTROL 604.72 MG/ML
10 INJECTION INTRAVENOUS ONCE
Status: COMPLETED | OUTPATIENT
Start: 2022-05-20 | End: 2022-05-20

## 2022-05-20 RX ADMIN — GADOBUTROL 9 ML: 604.72 INJECTION INTRAVENOUS at 13:32

## 2022-05-24 ENCOUNTER — OFFICE VISIT (OUTPATIENT)
Dept: NEUROSURGERY | Facility: CLINIC | Age: 77
End: 2022-05-24
Attending: PHYSICIAN ASSISTANT
Payer: COMMERCIAL

## 2022-05-24 VITALS — OXYGEN SATURATION: 100 % | DIASTOLIC BLOOD PRESSURE: 82 MMHG | HEART RATE: 77 BPM | SYSTOLIC BLOOD PRESSURE: 144 MMHG

## 2022-05-24 DIAGNOSIS — M25.552 HIP PAIN, BILATERAL: ICD-10-CM

## 2022-05-24 DIAGNOSIS — M25.551 HIP PAIN, BILATERAL: ICD-10-CM

## 2022-05-24 DIAGNOSIS — M54.40 ACUTE RIGHT-SIDED LOW BACK PAIN WITH SCIATICA, SCIATICA LATERALITY UNSPECIFIED: Primary | ICD-10-CM

## 2022-05-24 PROCEDURE — 99213 OFFICE O/P EST LOW 20 MIN: CPT | Performed by: PHYSICIAN ASSISTANT

## 2022-05-24 PROCEDURE — G0463 HOSPITAL OUTPT CLINIC VISIT: HCPCS

## 2022-05-24 ASSESSMENT — PAIN SCALES - GENERAL: PAINLEVEL: NO PAIN (1)

## 2022-05-24 NOTE — LETTER
"    5/24/2022         RE: Guillermo Hurst  [address] [address] [id number]        Dear Colleague,    Thank you for referring your patient, Guillermo Hurst, to the [facility]OS[facility] [place]. Please see a copy of my visit note below.    NEUROSURGERY CLINIC PROGRESS NOTE    DATE OF VISIT: 5/24/2022    HPI:     Guillermo Hurst is a pleasant 76 year old male who presents to the clinic today for a lumbar spine MRI follow-up visit. We last evaluated him on 05/19/2022 at which time he described a constant, dull, aching pain that initiates in the midline low lumbar region. He really cannot appreciate any radicular symptoms to include paresthesia, numbness or perceived weakness. However, he has had right leg atrophy since 1999 resulting in some weakness. On exam, he is again noted to have right leg atrophy, PF, and EHL weakness but appropriate sensation and range of motion. He has attempted conservative management with physical therapy and NSAID's, without resolution of symptoms    Pertinent medical history consist of three lumbar surgeries performed over twenty years ago by Dr. España and Dr. Perdomo to include a L4-5 fusion.     We felt that it would be in his best interest to obtain a lumbar MRI and lumbar flexion and extension x-rays to further assess our concern for  lumbar stenosis, a bulging / herniated disk or other concerning pathology.    Current Outpatient Medications   Medication     diclofenac (VOLTAREN) 1 % topical gel     lisinopril (ZESTRIL) 20 MG tablet     sildenafil (VIAGRA) 50 MG tablet     simvastatin (ZOCOR) 20 MG tablet     No current facility-administered medications for this visit.       Allergies   Allergen Reactions     Iodine      \"puffed up\"     Penicillins      rash       Past Medical History:   Diagnosis Date     Essential hypertension, benign     Hypertension, Benign     LBBB (left bundle branch block)      Nephritis and nephropathy, not specified as acute " or chronic, with unspecified pathological lesion in kidney      Vertigo     has had multiple ear surgeries- told due to this by ENT     VIRAL PERICARDITIS 1975    resolved       Review Of Systems    Skin: negative  Eyes: negative  Ears/Nose/Throat: negative  Respiratory: No shortness of breath, dyspnea on exertion, cough, or hemoptysis  Cardiovascular: negative  Gastrointestinal: negative  Musculoskeletal: back pain  Neurologic: negative  Psychiatric: negative  Hematologic/Lymphatic/Immunologic: negative  Endocrine: negative    OBJECTIVE:    BP (!) 144/82   Pulse 77   SpO2 100%     Imaging:    LUMBAR SPINE TWO - THREE VIEWS 5/19/2022 10:47 AM     Interbody fusion device in the L4-L5 disc space again  noted. There is minimal degenerative anterolisthesis of L3 upon L4 in  flexion that reduces in extension. Alignment of the lumbar vertebrae  is otherwise normal in both the flexed and extended positions.  Vertebral body heights normal. No fractures.    MRI OF THE LUMBAR SPINE WITHOUT AND WITH CONTRAST 5/20/2022 1:32 PM    1. Postoperative and degenerative change of the lumbar spine as  detailed above.  2. L4 and L5 laminectomies.  3. Moderate spinal canal stenosis at L3-L4. No other significant  spinal canal stenosis of the lumbar spine.  4. Questionable right paracentral disc extrusion arising from the  L5-S1 disc extending down into the right lateral recess of the spinal  canal possibly result in irritation or impingement on the descending  right S1 nerve root.  5. Moderate neural foraminal stenosis on the right at L1-L2, on the  left at L2-L3, bilaterally at L3-L4 and on the left at L4-L5.    Radiographic Findings: Full radiological report in chart. I personally reviewed the images with the patient today.    Exam:    Patient appears comfortable and in no apparent distress. Moving all extremities.  Gait is non-antalgic.  CN II-XII grossly intact, alert and appropriate with conversation and  following  commands  Bilateral upper extremities with full strength including hand intrinsics and grasp.  Sensation intact throughout.  He is again noted to have right leg atrophy, PF, and EHL weakness but appropriate sensation and range of motion.  Normal sensation throughout bilaterally.      PLAN:    Mr. Hurst is overall doing quite well. He would like to hold off on any intervention at this time. He will continue his home exercise program set forth by physical therapy. He is not interested in any type of injection or surgery. He will also hold off on an Ortho referral for his hip arthritis.     The patient gave verbal understanding and is in agreement with the above plan. He  will call or return to the clinic for any worsening or changes in symptoms.    Respectfully,     SIXTO Hardy PA-C      Again, thank you for allowing me to participate in the care of your patient.        Sincerely,        Taz Molina PA-C

## 2022-05-24 NOTE — PROGRESS NOTES
"NEUROSURGERY CLINIC PROGRESS NOTE    DATE OF VISIT: 5/24/2022    HPI:     Guillermo Hurst is a pleasant 76 year old male who presents to the clinic today for a lumbar spine MRI follow-up visit. We last evaluated him on 05/19/2022 at which time he described a constant, dull, aching pain that initiates in the midline low lumbar region. He really cannot appreciate any radicular symptoms to include paresthesia, numbness or perceived weakness. However, he has had right leg atrophy since 1999 resulting in some weakness. On exam, he is again noted to have right leg atrophy, PF, and EHL weakness but appropriate sensation and range of motion. He has attempted conservative management with physical therapy and NSAID's, without resolution of symptoms    Pertinent medical history consist of three lumbar surgeries performed over twenty years ago by Dr. España and Dr. Perdomo to include a L4-5 fusion.     We felt that it would be in his best interest to obtain a lumbar MRI and lumbar flexion and extension x-rays to further assess our concern for  lumbar stenosis, a bulging / herniated disk or other concerning pathology.    Current Outpatient Medications   Medication     diclofenac (VOLTAREN) 1 % topical gel     lisinopril (ZESTRIL) 20 MG tablet     sildenafil (VIAGRA) 50 MG tablet     simvastatin (ZOCOR) 20 MG tablet     No current facility-administered medications for this visit.       Allergies   Allergen Reactions     Iodine      \"puffed up\"     Penicillins      rash       Past Medical History:   Diagnosis Date     Essential hypertension, benign     Hypertension, Benign     LBBB (left bundle branch block)      Nephritis and nephropathy, not specified as acute or chronic, with unspecified pathological lesion in kidney      Vertigo     has had multiple ear surgeries- told due to this by ENT     VIRAL PERICARDITIS 1975    resolved       Review Of Systems    Skin: negative  Eyes: negative  Ears/Nose/Throat: negative  Respiratory: No " shortness of breath, dyspnea on exertion, cough, or hemoptysis  Cardiovascular: negative  Gastrointestinal: negative  Musculoskeletal: back pain  Neurologic: negative  Psychiatric: negative  Hematologic/Lymphatic/Immunologic: negative  Endocrine: negative    OBJECTIVE:    BP (!) 144/82   Pulse 77   SpO2 100%     Imaging:    LUMBAR SPINE TWO - THREE VIEWS 5/19/2022 10:47 AM     Interbody fusion device in the L4-L5 disc space again  noted. There is minimal degenerative anterolisthesis of L3 upon L4 in  flexion that reduces in extension. Alignment of the lumbar vertebrae  is otherwise normal in both the flexed and extended positions.  Vertebral body heights normal. No fractures.    MRI OF THE LUMBAR SPINE WITHOUT AND WITH CONTRAST 5/20/2022 1:32 PM    1. Postoperative and degenerative change of the lumbar spine as  detailed above.  2. L4 and L5 laminectomies.  3. Moderate spinal canal stenosis at L3-L4. No other significant  spinal canal stenosis of the lumbar spine.  4. Questionable right paracentral disc extrusion arising from the  L5-S1 disc extending down into the right lateral recess of the spinal  canal possibly result in irritation or impingement on the descending  right S1 nerve root.  5. Moderate neural foraminal stenosis on the right at L1-L2, on the  left at L2-L3, bilaterally at L3-L4 and on the left at L4-L5.    Radiographic Findings: Full radiological report in chart. I personally reviewed the images with the patient today.    Exam:    Patient appears comfortable and in no apparent distress. Moving all extremities.  Gait is non-antalgic.  CN II-XII grossly intact, alert and appropriate with conversation and following  commands  Bilateral upper extremities with full strength including hand intrinsics and grasp.  Sensation intact throughout.  He is again noted to have right leg atrophy, PF, and EHL weakness but appropriate sensation and range of motion.  Normal sensation throughout  bilaterally.      PLAN:    Mr. Hurst is overall doing quite well. He would like to hold off on any intervention at this time. He will continue his home exercise program set forth by physical therapy. He is not interested in any type of injection or surgery. He will also hold off on an Ortho referral for his hip arthritis.     The patient gave verbal understanding and is in agreement with the above plan. He  will call or return to the clinic for any worsening or changes in symptoms.    Respectfully,     SIXTO Hardy, PA-C

## 2022-05-24 NOTE — NURSING NOTE
"Guillermo Hurst is a 76 year old male who presents for:  Chief Complaint   Patient presents with     Results     Mri follow-up          Vitals:    Vitals:    05/24/22 1102   BP: (!) 144/82   Pulse: 77   SpO2: 100%       BMI:  Estimated body mass index is 28.73 kg/m  as calculated from the following:    Height as of 5/19/22: 5' 11\" (1.803 m).    Weight as of 5/19/22: 206 lb (93.4 kg).    Pain Score:  No Pain (1)        Amendo Phorn      "

## 2022-06-02 ENCOUNTER — MYC MEDICAL ADVICE (OUTPATIENT)
Dept: INTERNAL MEDICINE | Facility: CLINIC | Age: 77
End: 2022-06-02
Payer: COMMERCIAL

## 2022-07-27 DIAGNOSIS — E78.5 HYPERLIPIDEMIA LDL GOAL <100: ICD-10-CM

## 2022-07-27 DIAGNOSIS — I10 ESSENTIAL HYPERTENSION, BENIGN: ICD-10-CM

## 2022-07-29 RX ORDER — LISINOPRIL 20 MG/1
20 TABLET ORAL DAILY
Qty: 90 TABLET | Refills: 0 | Status: SHIPPED | OUTPATIENT
Start: 2022-07-29 | End: 2022-10-25

## 2022-07-29 RX ORDER — SIMVASTATIN 20 MG
20 TABLET ORAL AT BEDTIME
Qty: 90 TABLET | Refills: 0 | Status: SHIPPED | OUTPATIENT
Start: 2022-07-29 | End: 2022-10-25

## 2022-10-15 ENCOUNTER — HEALTH MAINTENANCE LETTER (OUTPATIENT)
Age: 77
End: 2022-10-15

## 2022-10-24 DIAGNOSIS — I10 ESSENTIAL HYPERTENSION, BENIGN: ICD-10-CM

## 2022-10-24 DIAGNOSIS — E78.5 HYPERLIPIDEMIA LDL GOAL <100: ICD-10-CM

## 2022-10-25 RX ORDER — SIMVASTATIN 20 MG
20 TABLET ORAL AT BEDTIME
Qty: 90 TABLET | Refills: 1 | Status: SHIPPED | OUTPATIENT
Start: 2022-10-25 | End: 2023-04-24

## 2022-10-25 RX ORDER — LISINOPRIL 20 MG/1
20 TABLET ORAL DAILY
Qty: 90 TABLET | Refills: 0 | Status: SHIPPED | OUTPATIENT
Start: 2022-10-25 | End: 2023-01-24

## 2022-10-25 NOTE — TELEPHONE ENCOUNTER
Lisinopril Oral Tab 20 mg  Routing refill request to provider for review/approval because:  Blood Pressure out of range for [facility] refill protocol.    BP Readings from Last 3 Encounters:   05/24/22 (!) 144/82   05/19/22 (!) 166/88   05/10/22 136/70       LOV 05/10/2022

## 2022-10-25 NOTE — TELEPHONE ENCOUNTER
Simvastatin (Zocor) 20 mg tab  Prescription approved per [facility] Refill Protocol.     31-Aug-2018 22:25

## 2022-11-17 ENCOUNTER — MYC MEDICAL ADVICE (OUTPATIENT)
Dept: INTERNAL MEDICINE | Facility: CLINIC | Age: 77
End: 2022-11-17

## 2022-11-17 DIAGNOSIS — M72.0 DUPUYTREN CONTRACTURE: Primary | ICD-10-CM

## 2022-12-05 ENCOUNTER — TRANSFERRED RECORDS (OUTPATIENT)
Dept: INTERNAL MEDICINE | Facility: CLINIC | Age: 77
End: 2022-12-05

## 2023-01-20 DIAGNOSIS — I10 ESSENTIAL HYPERTENSION, BENIGN: ICD-10-CM

## 2023-01-24 RX ORDER — LISINOPRIL 20 MG/1
20 TABLET ORAL DAILY
Qty: 90 TABLET | Refills: 0 | Status: SHIPPED | OUTPATIENT
Start: 2023-01-24 | End: 2023-05-05

## 2023-01-24 NOTE — TELEPHONE ENCOUNTER
Routing refill request to provider for review/approval because:  Last blood pressure outside of parameters

## 2023-04-22 DIAGNOSIS — E78.5 HYPERLIPIDEMIA LDL GOAL <100: ICD-10-CM

## 2023-04-24 RX ORDER — SIMVASTATIN 20 MG
20 TABLET ORAL AT BEDTIME
Qty: 90 TABLET | Refills: 0 | Status: SHIPPED | OUTPATIENT
Start: 2023-04-24 | End: 2023-05-23

## 2023-05-04 DIAGNOSIS — I10 ESSENTIAL HYPERTENSION, BENIGN: ICD-10-CM

## 2023-05-05 RX ORDER — LISINOPRIL 20 MG/1
20 TABLET ORAL DAILY
Qty: 90 TABLET | Refills: 0 | Status: SHIPPED | OUTPATIENT
Start: 2023-05-05 | End: 2023-05-23

## 2023-05-05 NOTE — TELEPHONE ENCOUNTER
Routing refill request to provider for review/approval because:  BP Readings from Last 3 Encounters:   05/24/22 (!) 144/82   05/19/22 (!) 166/88   05/10/22 136/70

## 2023-05-09 ENCOUNTER — OFFICE VISIT (OUTPATIENT)
Dept: DERMATOLOGY | Facility: CLINIC | Age: 78
End: 2023-05-09
Payer: COMMERCIAL

## 2023-05-09 DIAGNOSIS — Z85.828 HISTORY OF BASAL CELL CARCINOMA (BCC): ICD-10-CM

## 2023-05-09 DIAGNOSIS — D18.01 ANGIOMA OF SKIN: ICD-10-CM

## 2023-05-09 DIAGNOSIS — L82.1 SEBORRHEIC KERATOSIS: ICD-10-CM

## 2023-05-09 DIAGNOSIS — L57.0 ACTINIC KERATOSIS: ICD-10-CM

## 2023-05-09 DIAGNOSIS — L81.4 LENTIGO: ICD-10-CM

## 2023-05-09 DIAGNOSIS — D22.9 NEVUS: Primary | ICD-10-CM

## 2023-05-09 DIAGNOSIS — L82.0 INFLAMED SEBORRHEIC KERATOSIS: ICD-10-CM

## 2023-05-09 PROCEDURE — 99213 OFFICE O/P EST LOW 20 MIN: CPT | Mod: 25 | Performed by: PHYSICIAN ASSISTANT

## 2023-05-09 PROCEDURE — 17000 DESTRUCT PREMALG LESION: CPT | Mod: 59 | Performed by: PHYSICIAN ASSISTANT

## 2023-05-09 PROCEDURE — 17110 DESTRUCTION B9 LES UP TO 14: CPT | Performed by: PHYSICIAN ASSISTANT

## 2023-05-09 NOTE — LETTER
5/9/2023         RE: Guillermo Hurst  [address] [address] [address]        Dear Colleague,    Thank you for referring your patient, Guillermo Hurst, to the [facility]. Please see a copy of my visit note below.    HPI:   Chief complaints: Guillermo Hurst is a pleasant 77 year old male who presents for Full skin cancer screening to rule out skin cancer   Last Skin Exam: 1 year ago      1st Baseline: no  Personal HX of Skin Cancer: yes BCC on the right PA cheek abut 8 years ago   Personal HX of Malignant Melanoma: no   Family HX of Skin Cancer / Malignant Melanoma: no  Personal HX of Atypical Moles:   no  Risk factors: history of sun exposure and burns  New / Changing lesions:none  Social History:   On review of systems, there are no further skin complaints, patient is feeling otherwise well.   ROS of the following were done and are negative: Constitutional, Eyes, Ears, Nose,   Mouth, Throat, Cardiovascular, Respiratory, GI, Genitourinary, Musculoskeletal,   Psychiatric, Endocrine, Allergic/Immunologic.    PHYSICAL EXAM:   There were no vitals taken for this visit.  Skin exam performed as follows: Type 2 skin. Mood appropriate  Alert and Oriented X 3. Well developed, well nourished in no distress.  General appearance: Normal  Head including face: Normal  Eyes: conjunctiva and lids: Normal  Mouth: Lips, teeth, gums: Normal  Neck: Normal  Chest-breast/axillae: Normal  Back: Normal  Extremities: digits/nails (clubbing): Normal  Eccrine and Apocrine glands: Normal  Right upper extremity: Normal  Left upper extremity: Normal  Right lower extremity: Normal  Left lower extremity: Normal  Skin: Scalp and body hair: See below    Pt deferred exam of breasts, groin, buttocks: No    Other physical findings:  1. Multiple pigmented macules on extremities and trunk  2. Multiple pigmented macules on face, trunk and extremities  3. Multiple vascular papules on trunk, arms and legs  4.  Multiple scattered keratotic plaques  5. Pink gritty papules on the right lateral brow x 1  6. Inflamed keratotic papule on the left post auricular neck x 1     Except as noted above, no other signs of skin cancer or melanoma.     ASSESSMENT/PLAN:   Benign Full skin cancer screening today. . Patient with history of BCC  Advised on monthly self exams and 1 year  Patient Education: Appropriate brochures given.    1. Multiple benign appearing melanocytic nevi on arms, legs and trunk. Discussed ABCDEs of melanoma and sunscreen.   2. Multiple lentigos on arms, legs and trunk. Advised benign, no treatment needed.  3. Multiple scattered angiomas. Advised benign, no treatment needed.   4. Seborrheic keratosis on arms, legs and trunk. Advised benign, no treatment needed.  5. Actinic keratosis on the right lateral brow x 1. As precancerous, cryosurgery performed. Advised on blistering and post-op care. Advised if not resolved in 1-2 months to return for evaluation  6. Inflamed seborrheic keratosis on the left post auricular neck x 1. As physically tender cryosurgery performed. Advised on post op care.           Follow-up: yearly/PRN sooner    1.) Patient was asked about new and changing moles. YES  2.) Patient received a complete physical skin examination: YES  3.) Patient was counseled to perform a monthly self skin examination: YES  Scribed By: Romina Gamez, MS, PAJAYLA          Again, thank you for allowing me to participate in the care of your patient.        Sincerely,        Romina Gamez PA-C

## 2023-05-09 NOTE — PROGRESS NOTES
HPI:   Chief complaints: Guillermo Hurst is a pleasant 77 year old male who presents for Full skin cancer screening to rule out skin cancer   Last Skin Exam: 1 year ago      1st Baseline: no  Personal HX of Skin Cancer: yes BCC on the right PA cheek abut 8 years ago   Personal HX of Malignant Melanoma: no   Family HX of Skin Cancer / Malignant Melanoma: no  Personal HX of Atypical Moles:   no  Risk factors: history of sun exposure and burns  New / Changing lesions:none  Social History:   On review of systems, there are no further skin complaints, patient is feeling otherwise well.   ROS of the following were done and are negative: Constitutional, Eyes, Ears, Nose,   Mouth, Throat, Cardiovascular, Respiratory, GI, Genitourinary, Musculoskeletal,   Psychiatric, Endocrine, Allergic/Immunologic.    PHYSICAL EXAM:   There were no vitals taken for this visit.  Skin exam performed as follows: Type 2 skin. Mood appropriate  Alert and Oriented X 3. Well developed, well nourished in no distress.  General appearance: Normal  Head including face: Normal  Eyes: conjunctiva and lids: Normal  Mouth: Lips, teeth, gums: Normal  Neck: Normal  Chest-breast/axillae: Normal  Back: Normal  Extremities: digits/nails (clubbing): Normal  Eccrine and Apocrine glands: Normal  Right upper extremity: Normal  Left upper extremity: Normal  Right lower extremity: Normal  Left lower extremity: Normal  Skin: Scalp and body hair: See below    Pt deferred exam of breasts, groin, buttocks: No    Other physical findings:  1. Multiple pigmented macules on extremities and trunk  2. Multiple pigmented macules on face, trunk and extremities  3. Multiple vascular papules on trunk, arms and legs  4. Multiple scattered keratotic plaques  5. Pink gritty papules on the right lateral brow x 1  6. Inflamed keratotic papule on the left post auricular neck x 1     Except as noted above, no other signs of skin cancer or melanoma.     ASSESSMENT/PLAN:   Benign Full  skin cancer screening today. . Patient with history of BCC  Advised on monthly self exams and 1 year  Patient Education: Appropriate brochures given.    1. Multiple benign appearing melanocytic nevi on arms, legs and trunk. Discussed ABCDEs of melanoma and sunscreen.   2. Multiple lentigos on arms, legs and trunk. Advised benign, no treatment needed.  3. Multiple scattered angiomas. Advised benign, no treatment needed.   4. Seborrheic keratosis on arms, legs and trunk. Advised benign, no treatment needed.  5. Actinic keratosis on the right lateral brow x 1. As precancerous, cryosurgery performed. Advised on blistering and post-op care. Advised if not resolved in 1-2 months to return for evaluation  6. Inflamed seborrheic keratosis on the left post auricular neck x 1. As physically tender cryosurgery performed. Advised on post op care.           Follow-up: yearly/PRN sooner    1.) Patient was asked about new and changing moles. YES  2.) Patient received a complete physical skin examination: YES  3.) Patient was counseled to perform a monthly self skin examination: YES  Scribed By: Romina Gamez MS, PA-C

## 2023-05-22 ASSESSMENT — ENCOUNTER SYMPTOMS
PARESTHESIAS: 0
HEARTBURN: 0
EYE PAIN: 0
CHILLS: 0
PALPITATIONS: 0
HEMATOCHEZIA: 0
FREQUENCY: 0
JOINT SWELLING: 0
ABDOMINAL PAIN: 0
WEAKNESS: 0
NAUSEA: 0
DIZZINESS: 0
SORE THROAT: 0
MYALGIAS: 1
COUGH: 0
FEVER: 0
SHORTNESS OF BREATH: 0
HEMATURIA: 0
DIARRHEA: 0
CONSTIPATION: 0
ARTHRALGIAS: 1
HEADACHES: 0
NERVOUS/ANXIOUS: 0
DYSURIA: 0

## 2023-05-22 ASSESSMENT — ACTIVITIES OF DAILY LIVING (ADL): CURRENT_FUNCTION: NO ASSISTANCE NEEDED

## 2023-05-23 ENCOUNTER — OFFICE VISIT (OUTPATIENT)
Dept: INTERNAL MEDICINE | Facility: CLINIC | Age: 78
End: 2023-05-23
Payer: COMMERCIAL

## 2023-05-23 VITALS
RESPIRATION RATE: 16 BRPM | TEMPERATURE: 98.5 F | OXYGEN SATURATION: 97 % | SYSTOLIC BLOOD PRESSURE: 112 MMHG | WEIGHT: 204.8 LBS | BODY MASS INDEX: 28.67 KG/M2 | DIASTOLIC BLOOD PRESSURE: 62 MMHG | HEIGHT: 71 IN | HEART RATE: 83 BPM

## 2023-05-23 DIAGNOSIS — Z00.00 ENCOUNTER FOR MEDICARE ANNUAL WELLNESS EXAM: ICD-10-CM

## 2023-05-23 DIAGNOSIS — I10 ESSENTIAL HYPERTENSION, BENIGN: ICD-10-CM

## 2023-05-23 DIAGNOSIS — Z12.5 SCREENING FOR PROSTATE CANCER: ICD-10-CM

## 2023-05-23 DIAGNOSIS — E78.5 HYPERLIPIDEMIA LDL GOAL <100: ICD-10-CM

## 2023-05-23 DIAGNOSIS — Z23 NEED FOR SHINGLES VACCINE: ICD-10-CM

## 2023-05-23 DIAGNOSIS — Z00.00 ENCOUNTER FOR PREVENTATIVE ADULT HEALTH CARE EXAMINATION: Primary | ICD-10-CM

## 2023-05-23 LAB
ALBUMIN SERPL BCG-MCNC: 4.5 G/DL (ref 3.5–5.2)
ALBUMIN UR-MCNC: NEGATIVE MG/DL
ALP SERPL-CCNC: 61 U/L (ref 40–129)
ALT SERPL W P-5'-P-CCNC: 25 U/L (ref 10–50)
ANION GAP SERPL CALCULATED.3IONS-SCNC: 9 MMOL/L (ref 7–15)
APPEARANCE UR: CLEAR
AST SERPL W P-5'-P-CCNC: 35 U/L (ref 10–50)
BACTERIA #/AREA URNS HPF: ABNORMAL /HPF
BILIRUB SERPL-MCNC: 0.4 MG/DL
BILIRUB UR QL STRIP: NEGATIVE
BUN SERPL-MCNC: 22.8 MG/DL (ref 8–23)
CALCIUM SERPL-MCNC: 9.7 MG/DL (ref 8.8–10.2)
CHLORIDE SERPL-SCNC: 103 MMOL/L (ref 98–107)
CHOLEST SERPL-MCNC: 162 MG/DL
COLOR UR AUTO: YELLOW
CREAT SERPL-MCNC: 1.22 MG/DL (ref 0.67–1.17)
DEPRECATED HCO3 PLAS-SCNC: 25 MMOL/L (ref 22–29)
ERYTHROCYTE [DISTWIDTH] IN BLOOD BY AUTOMATED COUNT: 12.5 % (ref 10–15)
GFR SERPL CREATININE-BSD FRML MDRD: 61 ML/MIN/1.73M2
GLUCOSE SERPL-MCNC: 103 MG/DL (ref 70–99)
GLUCOSE UR STRIP-MCNC: NEGATIVE MG/DL
HCT VFR BLD AUTO: 44.3 % (ref 40–53)
HDLC SERPL-MCNC: 64 MG/DL
HGB BLD-MCNC: 14.8 G/DL (ref 13.3–17.7)
HGB UR QL STRIP: NEGATIVE
KETONES UR STRIP-MCNC: NEGATIVE MG/DL
LDLC SERPL CALC-MCNC: 76 MG/DL
LEUKOCYTE ESTERASE UR QL STRIP: NEGATIVE
MCH RBC QN AUTO: 30.4 PG (ref 26.5–33)
MCHC RBC AUTO-ENTMCNC: 33.4 G/DL (ref 31.5–36.5)
MCV RBC AUTO: 91 FL (ref 78–100)
NITRATE UR QL: NEGATIVE
NONHDLC SERPL-MCNC: 98 MG/DL
PH UR STRIP: 5.5 [PH] (ref 5–7)
PLATELET # BLD AUTO: 255 10E3/UL (ref 150–450)
POTASSIUM SERPL-SCNC: 4.6 MMOL/L (ref 3.4–5.3)
PROT SERPL-MCNC: 7.1 G/DL (ref 6.4–8.3)
PSA SERPL DL<=0.01 NG/ML-MCNC: 0.89 NG/ML (ref 0–6.5)
RBC # BLD AUTO: 4.87 10E6/UL (ref 4.4–5.9)
RBC #/AREA URNS AUTO: ABNORMAL /HPF
SODIUM SERPL-SCNC: 137 MMOL/L (ref 136–145)
SP GR UR STRIP: 1.02 (ref 1–1.03)
SQUAMOUS #/AREA URNS AUTO: ABNORMAL /LPF
TRIGL SERPL-MCNC: 110 MG/DL
TSH SERPL DL<=0.005 MIU/L-ACNC: 1.25 UIU/ML (ref 0.3–4.2)
UROBILINOGEN UR STRIP-ACNC: 0.2 E.U./DL
WBC # BLD AUTO: 5.4 10E3/UL (ref 4–11)
WBC #/AREA URNS AUTO: ABNORMAL /HPF

## 2023-05-23 PROCEDURE — 80053 COMPREHEN METABOLIC PANEL: CPT | Performed by: INTERNAL MEDICINE

## 2023-05-23 PROCEDURE — 36415 COLL VENOUS BLD VENIPUNCTURE: CPT | Performed by: INTERNAL MEDICINE

## 2023-05-23 PROCEDURE — G0103 PSA SCREENING: HCPCS | Performed by: INTERNAL MEDICINE

## 2023-05-23 PROCEDURE — 84443 ASSAY THYROID STIM HORMONE: CPT | Performed by: INTERNAL MEDICINE

## 2023-05-23 PROCEDURE — 80061 LIPID PANEL: CPT | Performed by: INTERNAL MEDICINE

## 2023-05-23 PROCEDURE — 85027 COMPLETE CBC AUTOMATED: CPT | Performed by: INTERNAL MEDICINE

## 2023-05-23 PROCEDURE — G0439 PPPS, SUBSEQ VISIT: HCPCS | Performed by: INTERNAL MEDICINE

## 2023-05-23 PROCEDURE — 81001 URINALYSIS AUTO W/SCOPE: CPT | Performed by: INTERNAL MEDICINE

## 2023-05-23 PROCEDURE — 99214 OFFICE O/P EST MOD 30 MIN: CPT | Mod: 25 | Performed by: INTERNAL MEDICINE

## 2023-05-23 RX ORDER — LISINOPRIL 20 MG/1
20 TABLET ORAL DAILY
Qty: 90 TABLET | Refills: 3 | Status: SHIPPED | OUTPATIENT
Start: 2023-05-23 | End: 2024-05-31

## 2023-05-23 RX ORDER — SIMVASTATIN 20 MG
20 TABLET ORAL AT BEDTIME
Qty: 90 TABLET | Refills: 3 | Status: SHIPPED | OUTPATIENT
Start: 2023-05-23 | End: 2024-05-31

## 2023-05-23 RX ORDER — CHOLECALCIFEROL (VITAMIN D3) 50 MCG
TABLET ORAL
COMMUNITY
Start: 2023-01-01

## 2023-05-23 ASSESSMENT — ENCOUNTER SYMPTOMS
JOINT SWELLING: 0
HEMATURIA: 0
NAUSEA: 0
DIARRHEA: 0
HEMATOCHEZIA: 0
CONSTIPATION: 0
HEARTBURN: 0
ARTHRALGIAS: 1
DYSURIA: 0
EYE PAIN: 0
SORE THROAT: 0
CHILLS: 0
SHORTNESS OF BREATH: 0
NERVOUS/ANXIOUS: 0
DIZZINESS: 0
HEADACHES: 0
PALPITATIONS: 0
COUGH: 0
PARESTHESIAS: 0
ABDOMINAL PAIN: 0
FEVER: 0
FREQUENCY: 0
MYALGIAS: 1
WEAKNESS: 0

## 2023-05-23 ASSESSMENT — PAIN SCALES - GENERAL: PAINLEVEL: NO PAIN (0)

## 2023-05-23 ASSESSMENT — ACTIVITIES OF DAILY LIVING (ADL): CURRENT_FUNCTION: NO ASSISTANCE NEEDED

## 2023-05-23 NOTE — PROGRESS NOTES
"SUBJECTIVE:   Yan is a 77 year old who presents for Preventive Visit.      5/23/2023     8:11 AM   Additional Questions   Roomed by Mavis REYES   Accompanied by n/a     Patient has been advised of split billing requirements and indicates understanding: Yes  Are you in the first 12 months of your Medicare coverage?  No    Healthy Habits:     In general, how would you rate your overall health?  Excellent    Frequency of exercise:  2-3 days/week    Duration of exercise:  15-30 minutes    Do you usually eat at least 4 servings of fruit and vegetables a day, include whole grains    & fiber and avoid regularly eating high fat or \"junk\" foods?  No    Taking medications regularly:  Yes    Medication side effects:  Not applicable    Ability to successfully perform activities of daily living:  No assistance needed    Home Safety:  Lack of grab bars in the bathroom    Hearing Impairment:  No hearing concerns    In the past 6 months, have you been bothered by leaking of urine?  No    In general, how would you rate your overall mental or emotional health?  Excellent      PHQ-2 Total Score: 0    Additional concerns today:  No        Have you ever done Advance Care Planning? (For example, a Health Directive, POLST, or a discussion with a medical provider or your loved ones about your wishes): Yes, patient states has an Advance Care Planning document and will bring a copy to the clinic.       Fall risk  Fallen 2 or more times in the past year?: No  Any fall with injury in the past year?: No    Cognitive Screening   1) Repeat 3 items (Leader, Season, Table)    2) Clock draw: NORMAL  3) 3 item recall: Recalls 3 objects  Results: 3 items recalled: COGNITIVE IMPAIRMENT LESS LIKELY    Mini-CogTM Copyright RUDY Lopez. Licensed by the author for use in [facility]; reprinted with permission ([email or web address]). All rights reserved.      Do you have sleep apnea, excessive snoring or daytime drowsiness?: no    Reviewed and updated as " needed this visit by clinical staff   Tobacco  Allergies  Meds  Problems  Med Hx  Surg Hx  Fam Hx          Reviewed and updated as needed this visit by Provider                 Social History     Tobacco Use     Smoking status: Former     Packs/day: 1.00     Years: 20.00     Pack years: 20.00     Types: Cigarettes     Quit date: 1985     Years since quittin.8     Smokeless tobacco: Never   Vaping Use     Vaping status: Never Used   Substance Use Topics     Alcohol use: Yes     Comment: Avg (1.0 per day, wine, beer or cocktail)             2023    12:39 PM   Alcohol Use   Prescreen: >3 drinks/day or >7 drinks/week? No     Do you have a current opioid prescription? No  Do you use any other controlled substances or medications that are not prescribed by a provider? None          PROBLEMS TO ADD ON...  Has h/o HTN. on medical treatment. BP has been controlled. No side effects from medications. No CP, HA, dizziness. good compliance with medications and low salt diet.  Has H/O hyperlipidemia. On medical treatment and diet. No side effects. No muscle weakness, myalgias or upset stomach.   Has h/o LS spine DDD, post 3 surgeries, has Rt calf muscle atrophy      Current providers sharing in care for this patient include:   Patient Care Team:  Cameron Kelly MD as PCP - Virginia Lopez PA-C as Physician Assistant (Dermatology)  Taz Molina PA-C as Assigned Musculoskeletal Provider  Cameron Kelly MD as Assigned PCP    The following health maintenance items are reviewed in Epic and correct as of today:  Health Maintenance   Topic Date Due     ZOSTER IMMUNIZATION (1 of 2) Never done     COVID-19 Vaccine (3 - Pfizer series) 2021     INFLUENZA VACCINE (1) 2022     BMP  05/10/2023     LIPID  05/10/2023     ANNUAL REVIEW OF HM ORDERS  05/10/2023     MEDICARE ANNUAL WELLNESS VISIT  05/10/2023     DTAP/TDAP/TD IMMUNIZATION (2 - Td or Tdap) 2023     FALL  "RISK ASSESSMENT  05/23/2024     ADVANCE CARE PLANNING  05/10/2027     COLORECTAL CANCER SCREENING  01/24/2029     HEPATITIS C SCREENING  Completed     PHQ-2 (once per calendar year)  Completed     Pneumococcal Vaccine: 65+ Years  Completed     IPV IMMUNIZATION  Aged Out     MENINGITIS IMMUNIZATION  Aged Out     LUNG CANCER SCREENING  Discontinued     Lab work is in process  Labs reviewed in EPIC          Review of Systems   Constitutional: Negative for chills and fever.   HENT: Negative for congestion, ear pain, hearing loss and sore throat.    Eyes: Negative for pain and visual disturbance.   Respiratory: Negative for cough and shortness of breath.    Cardiovascular: Negative for chest pain, palpitations and peripheral edema.   Gastrointestinal: Negative for abdominal pain, constipation, diarrhea, heartburn, hematochezia and nausea.   Genitourinary: Positive for impotence. Negative for dysuria, frequency, genital sores, hematuria, penile discharge and urgency.   Musculoskeletal: Positive for arthralgias and myalgias. Negative for joint swelling.   Skin: Negative for rash.   Neurological: Negative for dizziness, weakness, headaches and paresthesias.   Psychiatric/Behavioral: Negative for mood changes. The patient is not nervous/anxious.          OBJECTIVE:   /62 (BP Location: Left arm, Cuff Size: Adult Regular)   Pulse 83   Temp 98.5  F (36.9  C) (Tympanic)   Resp 16   Ht 1.803 m (5' 11\")   Wt 92.9 kg (204 lb 12.8 oz)   SpO2 97%   BMI 28.56 kg/m   Estimated body mass index is 28.56 kg/m  as calculated from the following:    Height as of this encounter: 1.803 m (5' 11\").    Weight as of this encounter: 92.9 kg (204 lb 12.8 oz).  Physical Exam  GENERAL: healthy, alert and no distress  EYES: Eyes grossly normal to inspection, PERRL and conjunctivae and sclerae normal  HENT: ear canals and TM's normal, nose and mouth without ulcers or lesions  NECK: no adenopathy, no asymmetry, masses, or scars and thyroid " normal to palpation  RESP: lungs clear to auscultation - no rales, rhonchi or wheezes  CV: regular rate and rhythm, normal S1 S2, no S3 or S4, no murmur, click or rub, no peripheral edema and peripheral pulses strong  ABDOMEN: soft, nontender, no hepatosplenomegaly, no masses and bowel sounds normal  MS: no gross musculoskeletal defects noted, no edema, right calf muscle atrophy, mild LE edema   SKIN: no suspicious lesions or rashes  NEURO: Normal strength and tone, mentation intact and speech normal  PSYCH: mentation appears normal, affect normal/bright    Diagnostic Test Results:  Labs reviewed in Epic    ASSESSMENT / PLAN:       ICD-10-CM    1. Encounter for preventative adult health care examination  Z00.00 Lipid panel reflex to direct LDL Fasting     CBC with platelets     Comprehensive metabolic panel (BMP + Alb, Alk Phos, ALT, AST, Total. Bili, TP)     PSA, screen     TSH with free T4 reflex     UA with Microscopic reflex to Culture - lab collect      2. Need for shingles vaccine  Z23       3. Essential hypertension, benign  I10 lisinopril (ZESTRIL) 20 MG tablet     Lipid panel reflex to direct LDL Fasting     CBC with platelets     Comprehensive metabolic panel (BMP + Alb, Alk Phos, ALT, AST, Total. Bili, TP)     TSH with free T4 reflex     UA with Microscopic reflex to Culture - lab collect     OFFICE/OUTPT VISIT,EST,LEVL III      4. Hyperlipidemia LDL goal <100  E78.5 simvastatin (ZOCOR) 20 MG tablet     OFFICE/OUTPT VISIT,EST,LEVL III      5. Screening for prostate cancer  Z12.5 PSA, screen        Assess lab work   Continue treatment, controlled HTN  Keep low cholesterol diet     Patient has been advised of split billing requirements and indicates understanding: Yes      COUNSELING:  Reviewed preventive health counseling, as reflected in patient instructions       Regular exercise       Healthy diet/nutrition       Vision screening       Hearing screening       Colon cancer screening       Prostate cancer  screening        He reports that he quit smoking about 37 years ago. His smoking use included cigarettes. He has a 20.00 pack-year smoking history. He has never used smokeless tobacco.      Appropriate preventive services were discussed with this patient, including applicable screening as appropriate for cardiovascular disease, diabetes, osteopenia/osteoporosis, and glaucoma.  As appropriate for age/gender, discussed screening for colorectal cancer, prostate cancer, breast cancer, and cervical cancer. Checklist reviewing preventive services available has been given to the patient.    Reviewed patients plan of care and provided an AVS. The Intermediate Care Plan ( asthma action plan, low back pain action plan, and migraine action plan) for Guillermo meets the Care Plan requirement. This Care Plan has been established and reviewed with the Patient.          Cameron Kelly MD  [facility]    Identified Health Risks:    I have reviewed Opioid Use Disorder and Substance Use Disorder risk factors and made any needed referrals.       The patient was counseled and encouraged to consider modifying their diet and eating habits. He was provided with information on recommended healthy diet options.

## 2023-05-23 NOTE — PATIENT INSTRUCTIONS
Patient Education   Personalized Prevention Plan  You are due for the preventive services outlined below.  Your care team is available to assist you in scheduling these services.  If you have already completed any of these items, please share that information with your care team to update in your medical record.  Health Maintenance Due   Topic Date Due     Zoster (Shingles) Vaccine (1 of 2) Never done     COVID-19 Vaccine (3 - Pfizer series) 05/05/2021     Flu Vaccine (1) 09/01/2022     Basic Metabolic Panel  05/10/2023     Cholesterol Lab  05/10/2023       Understanding USDA MyPlate  The USDA has guidelines to help you make healthy food choices. These are called MyPlate. MyPlate shows the food groups that make up healthy meals using the image of a place setting. Before you eat, think about the healthiest choices for what to put on your plate or in your cup or bowl. To learn more about building a healthy plate, visit www.choosemyplate.gov.     The food groups    Fruits. Any fruit or 100% fruit juice counts as part of the Fruit Group. Fruits may be fresh, canned, frozen, or dried, and may be whole, cut-up, or pureed. Make 1/2 of your plate fruits and vegetables.    Vegetables. Any vegetable or 100% vegetable juice counts as a member of the Vegetable Group. Vegetables may be fresh, frozen, canned, or dried. They can be served raw or cooked and may be whole, cut-up, or mashed. Make 1/2 of your plate fruits and vegetables.    Grains. All foods made from grains are part of the Grains Group. These include wheat, rice, oats, cornmeal, and barley. Grains are often used to make foods such as bread, pasta, oatmeal, cereal, tortillas, and grits. Grains should be no more than 1/4 of your plate. At least half of your grains should be whole grains.    Protein. This group includes meat, poultry, seafood, beans and peas, eggs, processed soy products (such as tofu), nuts (including nut butters), and seeds. Make protein choices no  more than 1/4 of your plate. Meat and poultry choices should be lean or low fat.    Dairy. The Dairy Group includes all fluid milk products and foods made from milk that contain calcium, such as yogurt and cheese. (Foods that have little calcium, such as cream, butter, and cream cheese, are not part of this group.) Most dairy choices should be low-fat or fat-free.    Oils. Oils aren't a food group, but they do contain essential nutrients. However it's important to watch your intake of oils. These are fats that are liquid at room temperature. They include canola, corn, olive, soybean, vegetable, and sunflower oil. Foods that are mainly oil include mayonnaise, certain salad dressings, and soft margarines. You likely already get your daily oil allowance from the foods you eat.  Things to limit  Eating healthy also means limiting these things in your diet:    Salt (sodium). Many processed foods have a lot of sodium. To keep sodium intake down, eat fresh vegetables, meats, poultry, and seafood when possible. Purchase low-sodium, reduced-sodium, or no-salt-added food products at the store. And don't add salt to your meals at home. Instead, season them with herbs and spices such as dill, oregano, cumin, and paprika. Or try adding flavor with lemon or lime zest and juice.    Saturated fat. Saturated fats are most often found in animal products such as beef, pork, and chicken. They are often solid at room temperature, such as butter. To reduce your saturated fat intake, choose leaner cuts of meat and poultry. And try healthier cooking methods such as grilling, broiling, roasting, or baking. For a simple lower-fat swap, use plain nonfat yogurt instead of mayonnaise when making potato salad or macaroni salad.    Added sugars. These are sugars added to foods. They are in foods such as ice cream, candy, soda, fruit drinks, sports drinks, energy drinks, cookies, pastries, jams, and syrups. Cut down on added sugars by sharing  sweet treats with a family member or friend. You can also choose fruit for dessert, and drink water or other unsweetened beverages.  Medium last reviewed this educational content on 6/1/2020 2000-2022 [employer]. All rights reserved. This information is not intended as a substitute for professional medical care. Always follow your healthcare professional's instructions.

## 2023-05-23 NOTE — LETTER
May 24, 2023      Yan Hurst  [address] [id number]        Dear ,    We are writing to inform you of your test results.    Your labs are within normal limits.    Resulted Orders   Lipid panel reflex to direct LDL Fasting   Result Value Ref Range    Cholesterol 162 <200 mg/dL    Triglycerides 110 <150 mg/dL    Direct Measure HDL 64 >=40 mg/dL    LDL Cholesterol Calculated 76 <=100 mg/dL    Non HDL Cholesterol 98 <130 mg/dL    Narrative    Cholesterol  Desirable:  <200 mg/dL    Triglycerides  Normal:  Less than 150 mg/dL  Borderline High:  150-199 mg/dL  High:  200-499 mg/dL  Very High:  Greater than or equal to 500 mg/dL    Direct Measure HDL  Female:  Greater than or equal to 50 mg/dL   Male:  Greater than or equal to 40 mg/dL    LDL Cholesterol  Desirable:  <100mg/dL  Above Desirable:  100-129 mg/dL   Borderline High:  130-159 mg/dL   High:  160-189 mg/dL   Very High:  >= 190 mg/dL    Non HDL Cholesterol  Desirable:  130 mg/dL  Above Desirable:  130-159 mg/dL  Borderline High:  160-189 mg/dL  High:  190-219 mg/dL  Very High:  Greater than or equal to 220 mg/dL   CBC with platelets   Result Value Ref Range    WBC Count 5.4 4.0 - 11.0 10e3/uL    RBC Count 4.87 4.40 - 5.90 10e6/uL    Hemoglobin 14.8 13.3 - 17.7 g/dL    Hematocrit 44.3 40.0 - 53.0 %    MCV 91 78 - 100 fL    MCH 30.4 26.5 - 33.0 pg    MCHC 33.4 31.5 - 36.5 g/dL    RDW 12.5 10.0 - 15.0 %    Platelet Count 255 150 - 450 10e3/uL   Comprehensive metabolic panel (BMP + Alb, Alk Phos, ALT, AST, Total. Bili, TP)   Result Value Ref Range    Sodium 137 136 - 145 mmol/L    Potassium 4.6 3.4 - 5.3 mmol/L    Chloride 103 98 - 107 mmol/L    Carbon Dioxide (CO2) 25 22 - 29 mmol/L    Anion Gap 9 7 - 15 mmol/L    Urea Nitrogen 22.8 8.0 - 23.0 mg/dL    Creatinine 1.22 (H) 0.67 - 1.17 mg/dL    Calcium 9.7 8.8 - 10.2 mg/dL    Glucose 103 (H) 70 - 99 mg/dL    Alkaline Phosphatase 61 40 - 129 U/L    AST 35 10 - 50 U/L    ALT 25 10 - 50 U/L     Protein Total 7.1 6.4 - 8.3 g/dL    Albumin 4.5 3.5 - 5.2 g/dL    Bilirubin Total 0.4 <=1.2 mg/dL    GFR Estimate 61 >60 mL/min/1.73m2      Comment:      eGFR calculated using 2021 CKD-EPI equation.   PSA, screen   Result Value Ref Range    Prostate Specific Antigen Screen 0.89 0.00 - 6.50 ng/mL    Narrative    This result is obtained using the Roche Elecsys total PSA method on the john paul e801 immunoassay analyzer. Results obtained with different assay methods or kits cannot be used interchangeably.   TSH with free T4 reflex   Result Value Ref Range    TSH 1.25 0.30 - 4.20 uIU/mL   UA with Microscopic reflex to Culture - lab collect   Result Value Ref Range    Color Urine Yellow Colorless, Straw, Light Yellow, Yellow    Appearance Urine Clear Clear    Glucose Urine Negative Negative mg/dL    Bilirubin Urine Negative Negative    Ketones Urine Negative Negative mg/dL    Specific Gravity Urine 1.020 1.003 - 1.035    Blood Urine Negative Negative    pH Urine 5.5 5.0 - 7.0    Protein Albumin Urine Negative Negative mg/dL    Urobilinogen Urine 0.2 0.2, 1.0 E.U./dL    Nitrite Urine Negative Negative    Leukocyte Esterase Urine Negative Negative   UA Microscopic with Reflex to Culture   Result Value Ref Range    Bacteria Urine Moderate (A) None Seen /HPF    RBC Urine 0-2 0-2 /HPF /HPF    WBC Urine 0-5 0-5 /HPF /HPF    Squamous Epithelials Urine Few (A) None Seen /LPF    Narrative    Urine Culture not indicated       If you have any questions or concerns, please call the clinic at the number listed above.       Sincerely,      Cameron Kelly MD

## 2023-08-01 DIAGNOSIS — N52.9 ERECTILE DYSFUNCTION, UNSPECIFIED ERECTILE DYSFUNCTION TYPE: ICD-10-CM

## 2023-08-01 RX ORDER — SILDENAFIL 50 MG/1
50-100 TABLET, FILM COATED ORAL DAILY PRN
Qty: 15 TABLET | Refills: 0 | Status: SHIPPED | OUTPATIENT
Start: 2023-08-01 | End: 2024-05-06

## 2023-11-19 ENCOUNTER — OFFICE VISIT (OUTPATIENT)
Dept: URGENT CARE | Facility: URGENT CARE | Age: 78
End: 2023-11-19
Payer: COMMERCIAL

## 2023-11-19 ENCOUNTER — ANCILLARY PROCEDURE (OUTPATIENT)
Dept: GENERAL RADIOLOGY | Facility: CLINIC | Age: 78
End: 2023-11-19
Attending: PHYSICIAN ASSISTANT
Payer: COMMERCIAL

## 2023-11-19 VITALS
SYSTOLIC BLOOD PRESSURE: 126 MMHG | RESPIRATION RATE: 14 BRPM | WEIGHT: 203 LBS | DIASTOLIC BLOOD PRESSURE: 80 MMHG | OXYGEN SATURATION: 96 % | HEART RATE: 72 BPM | BODY MASS INDEX: 28.31 KG/M2 | TEMPERATURE: 98.8 F

## 2023-11-19 DIAGNOSIS — S43.401A SPRAIN OF RIGHT SHOULDER, UNSPECIFIED SHOULDER SPRAIN TYPE, INITIAL ENCOUNTER: ICD-10-CM

## 2023-11-19 DIAGNOSIS — S49.91XA SHOULDER INJURY, RIGHT, INITIAL ENCOUNTER: ICD-10-CM

## 2023-11-19 DIAGNOSIS — S49.91XA SHOULDER INJURY, RIGHT, INITIAL ENCOUNTER: Primary | ICD-10-CM

## 2023-11-19 PROCEDURE — 73030 X-RAY EXAM OF SHOULDER: CPT | Mod: TC | Performed by: RADIOLOGY

## 2023-11-19 PROCEDURE — 99213 OFFICE O/P EST LOW 20 MIN: CPT | Performed by: PHYSICIAN ASSISTANT

## 2023-11-19 ASSESSMENT — ENCOUNTER SYMPTOMS
HEADACHES: 0
DIZZINESS: 0
FEVER: 0
ABDOMINAL PAIN: 0
LIGHT-HEADEDNESS: 0
SHORTNESS OF BREATH: 0

## 2023-11-19 NOTE — PATIENT INSTRUCTIONS
No signs of fracture on right shoulder x-ray. To manage pain and inflammation, rest, naproxen, ice.

## 2023-11-19 NOTE — PROGRESS NOTES
Assessment & Plan:        ICD-10-CM    1. Shoulder injury, right, initial encounter  S49.91XA XR Shoulder Right G/E 3 Views      2. Sprain of right shoulder, unspecified shoulder sprain type, initial encounter  S43.401A             Plan/Clinical Decision Making:  Yan presents after falling yesterday and landing on his right shoulder. He also reports hitting the right side of his head. No headaches, vision changes, dizziness, not on blood thinners. Low concern for intracranial injury or hemorrhage. He reports pain in his right shoulder and pain with ROM. Normal sensation, pulses, and capillary refill to RUE. I independently visualized the xray:   Xray did not show signs of fracture. Will manage pain with rest, naproxen, ice.          Return if symptoms worsen or fail to improve.     At the end of the encounter, I discussed results, diagnosis, medications. Discussed red flags for immediate return to clinic/ER, as well as indications for follow up if no improvement. Patient understood and agreed to plan. Patient was stable for discharge.        HALIE Gallegos            I, Cherrie Wilson PA-C, was present with the medical/[employer]ULEY[employer] student who participated in the service and in the documentation of the note.  I have verified the history and personally performed the physical exam and medical decision making.  I agree with the assessment and plan of care as documented in the note.            Cherrie Wilson PA-C on 11/19/2023 at 1:33 PM            Subjective:     HPI:    Yan is a 78 year old male who presents to clinic today for the following health issues:  Chief Complaint   Patient presents with    Fall     -Fell down on concrete sidewalk on right shoulder. Want x-ray to check for broken bones!  -- x yesterday -- having pain and want an xray-- took some aleeve last night     HPI  Yan presents after falling yesterday afternoon. He fell on his right shoulder and would like to have an XR to make sure he did not break  any bones. He states he hit the side of his head when he fell. Patient is not on blood thinners. Patient stumbled when watering plants. Did not feel dizzy or lightheaded leading up to the fall. No chest pain or SOB. Took alieve to relieve pain, which helped.     Review of Systems   Constitutional:  Negative for fever.   Respiratory:  Negative for shortness of breath.    Cardiovascular:  Negative for chest pain.   Gastrointestinal:  Negative for abdominal pain.   Neurological:  Negative for dizziness, light-headedness and headaches.         Patient Active Problem List   Diagnosis    Essential hypertension, benign    Advanced directives, counseling/discussion    Former smoker    Hyperlipidemia LDL goal <100    Pulmonary nodule, left    Left bundle branch block        Past Medical History:   Diagnosis Date    Arthritis 2017    Hips, noted when xrays done for torn miniscus    Cancer (H) 2017    Basal cell carcinova, removed skin by right ear    Essential hypertension, benign     Hypertension, Benign    LBBB (left bundle branch block)     Nephritis and nephropathy, not specified as acute or chronic, with unspecified pathological lesion in kidney     Vertigo     has had multiple ear surgeries- told due to this by ENT    VIRAL PERICARDITIS 1975    resolved       Social History     Tobacco Use    Smoking status: Former     Packs/day: 1.00     Years: 20.00     Additional pack years: 0.00     Total pack years: 20.00     Types: Cigarettes     Quit date: 1985     Years since quittin.3    Smokeless tobacco: Never   Substance Use Topics    Alcohol use: Yes     Comment: Avg (1.0 per day, wine, beer or cocktail)             Objective:     Vitals:    23 1323   BP: 126/80   BP Location: Left arm   Patient Position: Chair   Cuff Size: Adult Regular   Pulse: 72   Resp: 14   Temp: 98.8  F (37.1  C)   TempSrc: Oral   SpO2: 96%   Weight: 92.1 kg (203 lb)         Physical Exam   GENERAL: healthy, alert and no  distress  NECK: no adenopathy, no asymmetry, masses, or scars and thyroid normal to palpation  RESP: lungs clear to auscultation - no rales, rhonchi or wheezes  CV: regular rate and rhythm, normal S1 S2, no S3 or S4   Right radial pulse normal, capillary refill normal  MS: no gross musculoskeletal defects noted, no edema  Right shoulder exam shows normal strength and muscle mass, no erythema, induration, or nodules, and ROM of all joints is normal  NEURO: alert and oriented   RUE sensation normal      Results:  Results for orders placed or performed in visit on 11/19/23   XR Shoulder Right G/E 3 Views     Status: None    Narrative    EXAM: XR SHOULDER RIGHT G/E 3 VIEWS  LOCATION: [facility]  DATE: 11/19/2023    INDICATION: Right shoulder pain after injury.  COMPARISON: None.      Impression    IMPRESSION: Normal joint alignment. No acute fracture or concerning bone lesion. Normal glenohumeral joint. Mild degenerative arthrosis at the AC joint.

## 2024-05-04 DIAGNOSIS — N52.9 ERECTILE DYSFUNCTION, UNSPECIFIED ERECTILE DYSFUNCTION TYPE: ICD-10-CM

## 2024-05-06 RX ORDER — SILDENAFIL 50 MG/1
50-100 TABLET, FILM COATED ORAL DAILY PRN
Qty: 15 TABLET | Refills: 0 | Status: SHIPPED | OUTPATIENT
Start: 2024-05-06

## 2024-05-14 ENCOUNTER — OFFICE VISIT (OUTPATIENT)
Dept: DERMATOLOGY | Facility: CLINIC | Age: 79
End: 2024-05-14
Payer: COMMERCIAL

## 2024-05-14 DIAGNOSIS — D18.01 ANGIOMA OF SKIN: ICD-10-CM

## 2024-05-14 DIAGNOSIS — L72.9 CYST OF SKIN: ICD-10-CM

## 2024-05-14 DIAGNOSIS — L82.1 SEBORRHEIC KERATOSIS: ICD-10-CM

## 2024-05-14 DIAGNOSIS — D22.9 NEVUS: Primary | ICD-10-CM

## 2024-05-14 DIAGNOSIS — Z85.828 HISTORY OF BASAL CELL CARCINOMA (BCC): ICD-10-CM

## 2024-05-14 DIAGNOSIS — L81.4 LENTIGO: ICD-10-CM

## 2024-05-14 DIAGNOSIS — D48.5 NEOPLASM OF UNCERTAIN BEHAVIOR OF SKIN: ICD-10-CM

## 2024-05-14 PROCEDURE — 88305 TISSUE EXAM BY PATHOLOGIST: CPT | Performed by: DERMATOLOGY

## 2024-05-14 PROCEDURE — 11102 TANGNTL BX SKIN SINGLE LES: CPT | Performed by: PHYSICIAN ASSISTANT

## 2024-05-14 PROCEDURE — 99213 OFFICE O/P EST LOW 20 MIN: CPT | Mod: 25 | Performed by: PHYSICIAN ASSISTANT

## 2024-05-14 NOTE — PATIENT INSTRUCTIONS
Wound Care Instructions     FOR SUPERFICIAL WOUNDS     [facility]  [phone]                 AFTER 24 HOURS YOU SHOULD REMOVE THE BANDAGE AND BEGIN DAILY DRESSING CHANGES AS FOLLOWS:     1) Remove Dressing.     2) Clean and dry the area with tap water using a Q-tip or sterile gauze pad.     3) Apply Vaseline, Aquaphor, Polysporin ointment or Bacitracin ointment over entire wound.  Do NOT use Neosporin ointment.     4) Cover the wound with a band-aid, or a sterile non-stick gauze pad and micropore paper tape    REPEAT THESE INSTRUCTIONS AT LEAST ONCE A DAY UNTIL THE WOUND HAS COMPLETELY HEALED.    It is an old wives tale that a wound heals better when it is exposed to air and allowed to dry out. The wound will heal faster with a better cosmetic result if it is kept moist with ointment and covered with a bandage.    **Do not let the wound dry out.**    Supplies Needed:      *Cotton tipped applicators (Q-tips)    *Vaseline, Aquaphor, Polysporin or Bacitracin Ointment (NOT NEOSPORIN)    *Band-aids or non-stick gauze pads and micropore paper tape.      PATIENT INFORMATION:    During the healing process you will notice a number of changes. All wounds develop a small halo of redness surrounding the wound.  This means healing is occurring. Severe itching with extensive redness usually indicates sensitivity to the ointment or bandage tape used to dress the wound.  You should call our office if this develops.      Swelling  and/or discoloration around your surgical site is common, particularly when performed around the eye.    All wounds normally drain.  The larger the wound the more drainage there will be.  After 7-10 days, you will notice the wound beginning to shrink and new skin will begin to grow.  The wound is healed when you can see skin has formed over the entire area.  A healed wound has a healthy, shiny look to the surface and is red to dark pink in color to normalize.  Wounds may  take approximately 4-6 weeks to heal.  Larger wounds may take 6-8 weeks.  After the wound is healed you may discontinue dressing changes.    You may experience a sensation of tightness as your wound heals. This is normal and will gradually subside.    Your healed wound may be sensitive to temperature changes. This sensitivity improves with time, but if you re having a lot of discomfort, try to avoid temperature extremes.    Patients frequently experience itching after their wound appears to have healed because of the continue healing under the skin.  Plain Vaseline will help relieve the itching.      POSSIBLE COMPLICATIONS    BLEEDING:    Leave the bandage in place.  Use tightly rolled up gauze or a cloth to apply direct pressure over the bandage for 30  minutes.  Reapply pressure for an additional 30 minutes if necessary  Use additional gauze and tape to maintain pressure once the bleeding has stopped.

## 2024-05-14 NOTE — PROGRESS NOTES
HPI:   Chief complaints: Guillermo Hurst is a pleasant 78 year old male who presents for Full skin cancer screening to rule out skin cancer   Last Skin Exam: 1 year ago      1st Baseline: no  Personal HX of Skin Cancer: yes BCC on the right PA cheek abut 8 years ago   Personal HX of Malignant Melanoma: no   Family HX of Skin Cancer / Malignant Melanoma: no  Personal HX of Atypical Moles:   no  Risk factors: history of sun exposure and burns  New / Changing lesions:none. Previously noted cyst on the right posterior neck is largely resolved today.   Social History: Goes to the [facility] for 2 months over the winter. Does nathalia shooting for fun  On review of systems, there are no further skin complaints, patient is feeling otherwise well.   ROS of the following were done and are negative: Constitutional, Eyes, Ears, Nose,   Mouth, Throat, Cardiovascular, Respiratory, GI, Genitourinary, Musculoskeletal,   Psychiatric, Endocrine, Allergic/Immunologic.    PHYSICAL EXAM:   There were no vitals taken for this visit.  Skin exam performed as follows: Type 2 skin. Mood appropriate  Alert and Oriented X 3. Well developed, well nourished in no distress.  General appearance: Normal  Head including face: Normal  Eyes: conjunctiva and lids: Normal  Mouth: Lips, teeth, gums: Normal  Neck: Normal  Chest-breast/axillae: Normal  Back: Normal  Extremities: digits/nails (clubbing): Normal  Eccrine and Apocrine glands: Normal  Right upper extremity: Normal  Left upper extremity: Normal  Right lower extremity: Normal  Left lower extremity: Normal  Skin: Scalp and body hair: See below    Pt deferred exam of breasts, groin, buttocks: No    Other physical findings:  1. Multiple pigmented macules on extremities and trunk  2. Multiple pigmented macules on face, trunk and extremities  3. Multiple vascular papules on trunk, arms and legs  4. Multiple scattered keratotic plaques  5. 1 mm brown/black macule on the right temple       Except as  noted above, no other signs of skin cancer or melanoma.     ASSESSMENT/PLAN:   Benign Full skin cancer screening today. . Patient with history of BCC  Advised on monthly self exams and 1 year  Patient Education: Appropriate brochures given.    Multiple benign appearing melanocytic nevi on arms, legs and trunk. Discussed ABCDEs of melanoma and sunscreen.   Multiple lentigos on arms, legs and trunk. Advised benign, no treatment needed.  Multiple scattered angiomas. Advised benign, no treatment needed.   Seborrheic keratosis on arms, legs and trunk. Advised benign, no treatment needed.  R/o BCC on the right temple. Shave biopsy performed.  Area cleaned and anesthetized with 1% lidocaine with epinephrine.  Dermablade used to remove the lesion and sent to pathology. Bleeding was cauterized. Pt tolerated procedure well with no complications.   Small cyst on the right posterior neck - very small at this time. Will monitor. Excision if bothersome in the future.           Follow-up: yearly/PRN sooner    1.) Patient was asked about new and changing moles. YES  2.) Patient received a complete physical skin examination: YES  3.) Patient was counseled to perform a monthly self skin examination: YES  Scribed By: Romina Gamez, MS, PA-C

## 2024-05-14 NOTE — LETTER
5/14/2024         RE: Guillermo Hurst  [address] [id number]        Dear Colleague,    Thank you for referring your patient, Guillermo Hurst, to the [facility]. Please see a copy of my visit note below.    HPI:   Chief complaints: Guillermo Hurst is a pleasant 78 year old male who presents for Full skin cancer screening to rule out skin cancer   Last Skin Exam: 1 year ago      1st Baseline: no  Personal HX of Skin Cancer: yes BCC on the right PA cheek abut 8 years ago   Personal HX of Malignant Melanoma: no   Family HX of Skin Cancer / Malignant Melanoma: no  Personal HX of Atypical Moles:   no  Risk factors: history of sun exposure and burns  New / Changing lesions:none. Previously noted cyst on the right posterior neck is largely resolved today.   Social History: Goes to the [facility] for 2 months over the winter. Does nathalia shooting for fun  On review of systems, there are no further skin complaints, patient is feeling otherwise well.   ROS of the following were done and are negative: Constitutional, Eyes, Ears, Nose,   Mouth, Throat, Cardiovascular, Respiratory, GI, Genitourinary, Musculoskeletal,   Psychiatric, Endocrine, Allergic/Immunologic.    PHYSICAL EXAM:   There were no vitals taken for this visit.  Skin exam performed as follows: Type 2 skin. Mood appropriate  Alert and Oriented X 3. Well developed, well nourished in no distress.  General appearance: Normal  Head including face: Normal  Eyes: conjunctiva and lids: Normal  Mouth: Lips, teeth, gums: Normal  Neck: Normal  Chest-breast/axillae: Normal  Back: Normal  Extremities: digits/nails (clubbing): Normal  Eccrine and Apocrine glands: Normal  Right upper extremity: Normal  Left upper extremity: Normal  Right lower extremity: Normal  Left lower extremity: Normal  Skin: Scalp and body hair: See below    Pt deferred exam of breasts, groin, buttocks: No    Other physical findings:  1. Multiple  pigmented macules on extremities and trunk  2. Multiple pigmented macules on face, trunk and extremities  3. Multiple vascular papules on trunk, arms and legs  4. Multiple scattered keratotic plaques  5. 1 mm brown/black macule on the right temple       Except as noted above, no other signs of skin cancer or melanoma.     ASSESSMENT/PLAN:   Benign Full skin cancer screening today. . Patient with history of BCC  Advised on monthly self exams and 1 year  Patient Education: Appropriate brochures given.    Multiple benign appearing melanocytic nevi on arms, legs and trunk. Discussed ABCDEs of melanoma and sunscreen.   Multiple lentigos on arms, legs and trunk. Advised benign, no treatment needed.  Multiple scattered angiomas. Advised benign, no treatment needed.   Seborrheic keratosis on arms, legs and trunk. Advised benign, no treatment needed.  R/o BCC on the right temple. Shave biopsy performed.  Area cleaned and anesthetized with 1% lidocaine with epinephrine.  Dermablade used to remove the lesion and sent to pathology. Bleeding was cauterized. Pt tolerated procedure well with no complications.   Small cyst on the right posterior neck - very small at this time. Will monitor. Excision if bothersome in the future.           Follow-up: yearly/PRN sooner    1.) Patient was asked about new and changing moles. YES  2.) Patient received a complete physical skin examination: YES  3.) Patient was counseled to perform a monthly self skin examination: YES  Scribed By: Romina Gamez MS, PAJAYLA        Again, thank you for allowing me to participate in the care of your patient.        Sincerely,        Romina Gamez PA-C

## 2024-05-16 LAB
PATH REPORT.COMMENTS IMP SPEC: NORMAL
PATH REPORT.COMMENTS IMP SPEC: NORMAL
PATH REPORT.FINAL DX SPEC: NORMAL
PATH REPORT.GROSS SPEC: NORMAL
PATH REPORT.MICROSCOPIC SPEC OTHER STN: NORMAL
PATH REPORT.RELEVANT HX SPEC: NORMAL

## 2024-05-28 SDOH — HEALTH STABILITY: PHYSICAL HEALTH: ON AVERAGE, HOW MANY MINUTES DO YOU ENGAGE IN EXERCISE AT THIS LEVEL?: 130 MIN

## 2024-05-28 SDOH — HEALTH STABILITY: PHYSICAL HEALTH: ON AVERAGE, HOW MANY DAYS PER WEEK DO YOU ENGAGE IN MODERATE TO STRENUOUS EXERCISE (LIKE A BRISK WALK)?: 3 DAYS

## 2024-05-28 ASSESSMENT — SOCIAL DETERMINANTS OF HEALTH (SDOH): HOW OFTEN DO YOU GET TOGETHER WITH FRIENDS OR RELATIVES?: TWICE A WEEK

## 2024-05-31 ENCOUNTER — OFFICE VISIT (OUTPATIENT)
Dept: INTERNAL MEDICINE | Facility: CLINIC | Age: 79
End: 2024-05-31
Attending: INTERNAL MEDICINE
Payer: COMMERCIAL

## 2024-05-31 VITALS
RESPIRATION RATE: 16 BRPM | SYSTOLIC BLOOD PRESSURE: 130 MMHG | DIASTOLIC BLOOD PRESSURE: 65 MMHG | HEIGHT: 71 IN | WEIGHT: 204.6 LBS | HEART RATE: 54 BPM | BODY MASS INDEX: 28.64 KG/M2 | TEMPERATURE: 98 F | OXYGEN SATURATION: 99 %

## 2024-05-31 DIAGNOSIS — I10 ESSENTIAL HYPERTENSION, BENIGN: ICD-10-CM

## 2024-05-31 DIAGNOSIS — E78.5 HYPERLIPIDEMIA LDL GOAL <100: ICD-10-CM

## 2024-05-31 DIAGNOSIS — Z23 NEED FOR SHINGLES VACCINE: ICD-10-CM

## 2024-05-31 DIAGNOSIS — R91.8 PULMONARY NODULES: ICD-10-CM

## 2024-05-31 DIAGNOSIS — Z00.00 ENCOUNTER FOR PREVENTATIVE ADULT HEALTH CARE EXAMINATION: Primary | ICD-10-CM

## 2024-05-31 DIAGNOSIS — Z12.5 SCREENING FOR PROSTATE CANCER: ICD-10-CM

## 2024-05-31 DIAGNOSIS — E87.5 HYPERKALEMIA: ICD-10-CM

## 2024-05-31 DIAGNOSIS — R73.9 HYPERGLYCEMIA: ICD-10-CM

## 2024-05-31 DIAGNOSIS — Z23 NEED FOR TDAP VACCINATION: ICD-10-CM

## 2024-05-31 LAB
ALBUMIN UR-MCNC: NEGATIVE MG/DL
APPEARANCE UR: CLEAR
BACTERIA #/AREA URNS HPF: ABNORMAL /HPF
BILIRUB UR QL STRIP: NEGATIVE
COLOR UR AUTO: YELLOW
ERYTHROCYTE [DISTWIDTH] IN BLOOD BY AUTOMATED COUNT: 13 % (ref 10–15)
GLUCOSE UR STRIP-MCNC: NEGATIVE MG/DL
HBA1C MFR BLD: 5.2 % (ref 0–5.6)
HCT VFR BLD AUTO: 44.5 % (ref 40–53)
HGB BLD-MCNC: 15.3 G/DL (ref 13.3–17.7)
HGB UR QL STRIP: ABNORMAL
KETONES UR STRIP-MCNC: NEGATIVE MG/DL
LEUKOCYTE ESTERASE UR QL STRIP: NEGATIVE
MCH RBC QN AUTO: 31.4 PG (ref 26.5–33)
MCHC RBC AUTO-ENTMCNC: 34.4 G/DL (ref 31.5–36.5)
MCV RBC AUTO: 91 FL (ref 78–100)
MUCOUS THREADS #/AREA URNS LPF: PRESENT /LPF
NITRATE UR QL: NEGATIVE
PH UR STRIP: 6 [PH] (ref 5–7)
PLATELET # BLD AUTO: 225 10E3/UL (ref 150–450)
RBC # BLD AUTO: 4.88 10E6/UL (ref 4.4–5.9)
RBC #/AREA URNS AUTO: ABNORMAL /HPF
SP GR UR STRIP: 1.02 (ref 1–1.03)
SQUAMOUS #/AREA URNS AUTO: ABNORMAL /LPF
UROBILINOGEN UR STRIP-ACNC: 0.2 E.U./DL
WBC # BLD AUTO: 6.4 10E3/UL (ref 4–11)
WBC #/AREA URNS AUTO: ABNORMAL /HPF

## 2024-05-31 PROCEDURE — 83036 HEMOGLOBIN GLYCOSYLATED A1C: CPT | Performed by: INTERNAL MEDICINE

## 2024-05-31 PROCEDURE — G0439 PPPS, SUBSEQ VISIT: HCPCS | Performed by: INTERNAL MEDICINE

## 2024-05-31 PROCEDURE — G0103 PSA SCREENING: HCPCS | Performed by: INTERNAL MEDICINE

## 2024-05-31 PROCEDURE — 85027 COMPLETE CBC AUTOMATED: CPT | Performed by: INTERNAL MEDICINE

## 2024-05-31 PROCEDURE — 99214 OFFICE O/P EST MOD 30 MIN: CPT | Mod: 25 | Performed by: INTERNAL MEDICINE

## 2024-05-31 PROCEDURE — 81001 URINALYSIS AUTO W/SCOPE: CPT | Performed by: INTERNAL MEDICINE

## 2024-05-31 PROCEDURE — 36415 COLL VENOUS BLD VENIPUNCTURE: CPT | Performed by: INTERNAL MEDICINE

## 2024-05-31 PROCEDURE — 84443 ASSAY THYROID STIM HORMONE: CPT | Performed by: INTERNAL MEDICINE

## 2024-05-31 PROCEDURE — 80053 COMPREHEN METABOLIC PANEL: CPT | Performed by: INTERNAL MEDICINE

## 2024-05-31 PROCEDURE — 80061 LIPID PANEL: CPT | Performed by: INTERNAL MEDICINE

## 2024-05-31 RX ORDER — SIMVASTATIN 20 MG
20 TABLET ORAL AT BEDTIME
Qty: 90 TABLET | Refills: 3 | Status: SHIPPED | OUTPATIENT
Start: 2024-05-31

## 2024-05-31 RX ORDER — RESPIRATORY SYNCYTIAL VIRUS VACCINE 120MCG/0.5
0.5 KIT INTRAMUSCULAR ONCE
Qty: 1 EACH | Refills: 0 | Status: CANCELLED | OUTPATIENT
Start: 2024-05-31 | End: 2024-05-31

## 2024-05-31 RX ORDER — LISINOPRIL 20 MG/1
20 TABLET ORAL DAILY
Qty: 90 TABLET | Refills: 3 | Status: SHIPPED | OUTPATIENT
Start: 2024-05-31

## 2024-05-31 ASSESSMENT — PAIN SCALES - GENERAL: PAINLEVEL: NO PAIN (0)

## 2024-05-31 NOTE — PROGRESS NOTES
Preventive Care Visit  [facility]  Cameron Kelly MD, Internal Medicine  May 31, 2024      Assessment & Plan     Need for shingles vaccine    - zoster vaccine recombinant adjuvanted (SHINGRIX) injection; Inject 0.5 mLs into the muscle once for 1 dose Pharmacist administered    Need for Tdap vaccination    - Tdap, tetanus-diptheria-acell pertussis, (BOOSTRIX) 5-2.5-18.5 LF-MCG/0.5 KI injection; Inject 0.5 mLs into the muscle once for 1 dose    Essential hypertension, benign  Controlled , on treatment   - lisinopril (ZESTRIL) 20 MG tablet; Take 1 tablet (20 mg) by mouth daily  - CBC with platelets  - Comprehensive metabolic panel (BMP + Alb, Alk Phos, ALT, AST, Total. Bili, TP)  - TSH with free T4 reflex  - UA with Microscopic reflex to Culture - lab collect  - OFFICE/OUTPT VISIT,EST,LEVL III    Hyperlipidemia LDL goal <100  Controlled on statin   - Lipid panel reflex to direct LDL Non-fasting  - simvastatin (ZOCOR) 20 MG tablet; Take 1 tablet (20 mg) by mouth at bedtime  - OFFICE/OUTPT VISIT,EST,LEVL III    Encounter for preventative adult health care examination  advised regular aerobic activity, low cholesterol, low salt diet, wearing seat belt,  self examinations, sunscreen protection.Obtain screening cholesterol, immunizations reviewed.    - zoster vaccine recombinant adjuvanted (SHINGRIX) injection; Inject 0.5 mLs into the muscle once for 1 dose Pharmacist administered  - Tdap, tetanus-diptheria-acell pertussis, (BOOSTRIX) 5-2.5-18.5 LF-MCG/0.5 KI injection; Inject 0.5 mLs into the muscle once for 1 dose  - CT Chest w/o Contrast; Future  - Hemoglobin A1c  - CBC with platelets  - PSA, screen  - Comprehensive metabolic panel (BMP + Alb, Alk Phos, ALT, AST, Total. Bili, TP)  - TSH with free T4 reflex  - UA with Microscopic reflex to Culture - lab collect    Screening for prostate cancer    - PSA, screen    Hyperglycemia    - Hemoglobin A1c    Pulmonary nodules    - CT Chest w/o  "Contrast; Future    Patient has been advised of split billing requirements and indicates understanding: Yes        BMI  Estimated body mass index is 28.34 kg/m  as calculated from the following:    Height as of this encounter: 1.81 m (5' 11.25\").    Weight as of this encounter: 92.8 kg (204 lb 9.6 oz).       Counseling  Appropriate preventive services were discussed with this patient, including applicable screening as appropriate for fall prevention, nutrition, physical activity, Tobacco-use cessation, weight loss and cognition.  Checklist reviewing preventive services available has been given to the patient.  Reviewed patient's diet, addressing concerns and/or questions.   He is at risk for lack of exercise and has been provided with information to increase physical activity for the benefit of his well-being.   Patient reported safety concerns were addressed today.The patient was provided with written information regarding signs of hearing loss.       See Patient Instructions    Nadine Heredia is a 78 year old, presenting for the following:  Wellness Visit        5/31/2024     8:46 AM   Additional Questions   Roomed by Mavis REYES   Accompanied by n/a   Health Care Directive  Patient does not have a Health Care Directive or Living Will: Discussed advance care planning with patient; however, patient declined at this time.    HPI    Has h/o HTN. on medical treatment. BP has been controlled. No side effects from medications. No CP, HA, dizziness. good compliance with medications and low salt diet.  Has H/O hyperlipidemia. On medical treatment and diet. No side effects. No muscle weakness, myalgias or upset stomach.   Has h/o smoking, 9 mm left lung nodule. No cough, SOB.   Has had borderline elevated fasting glucose, no h/o DM.             5/28/2024   General Health   How would you rate your overall physical health? Excellent   Feel stress (tense, anxious, or unable to sleep) Not at all         5/28/2024   Nutrition "   Diet: Regular (no restrictions)         5/28/2024   Exercise   Days per week of moderate/strenous exercise 3 days   Average minutes spent exercising at this level 130 min         5/28/2024   Social Factors   Frequency of gathering with friends or relatives Twice a week   Worry food won't last until get money to buy more No   Food not last or not have enough money for food? No   Do you have housing?  Yes   Are you worried about losing your housing? No   Lack of transportation? No   Unable to get utilities (heat,electricity)? No         5/28/2024   Fall Risk   Fallen 2 or more times in the past year? No    No   Trouble with walking or balance? No    No          5/28/2024   Activities of Daily Living- Home Safety   Needs help with the following daily activites None of the above   Safety concerns in the home No grab bars in the bathroom         5/28/2024   Dental   Dentist two times every year? Yes         5/28/2024   Hearing Screening   Hearing concerns? (!) IT'S HARDER TO UNDERSTAND WOMEN'S VOICES THAN MEN'S VOICES.    (!) IT'S HARD TO FOLLOW A CONVERSATION IN A NOISY RESTAURANT OR CROWDED ROOM.    (!) TROUBLE FOLLOWING DIALOGUE IN THE THEATHER.    (!) TROUBLE UNDERSTANDING SOFT OR WHISPERED SPEECH.         5/28/2024   Driving Risk Screening   Patient/family members have concerns about driving No         5/28/2024   General Alertness/Fatigue Screening   Have you been more tired than usual lately? No         5/28/2024   Urinary Incontinence Screening   Bothered by leaking urine in past 6 months No         5/28/2024   TB Screening   Were you born outside of the [place]? No         Today's PHQ-2 Score:       5/31/2024     7:26 AM   PHQ-2 ( 1999 [employer])   Q1: Little interest or pleasure in doing things 0   Q2: Feeling down, depressed or hopeless 0   PHQ-2 Score 0   Q1: Little interest or pleasure in doing things Not at all   Q2: Feeling down, depressed or hopeless Not at all   PHQ-2 Score 0           5/28/2024   Substance  Use   Alcohol more than 3/day or more than 7/wk Not Applicable   Do you have a current opioid prescription? No   How severe/bad is pain from 1 to 10? 2/10   Do you use any other substances recreationally? No     Social History     Tobacco Use    Smoking status: Former     Current packs/day: 0.00     Average packs/day: 1 pack/day for 20.0 years (20.0 ttl pk-yrs)     Types: Cigarettes     Start date: 1965     Quit date: 1985     Years since quittin.8    Smokeless tobacco: Never   Vaping Use    Vaping status: Never Used   Substance Use Topics    Alcohol use: Yes     Comment: Avg (1.0 per day, wine, beer or cocktail)    Drug use: No       ASCVD Risk   The 10-year ASCVD risk score (Kelsey ERVIN, et al., 2019) is: 33.1%    Values used to calculate the score:      Age: 78 years      Sex: Male      Is Non- : No      Diabetic: No      Tobacco smoker: No      Systolic Blood Pressure: 130 mmHg      Is BP treated: Yes      HDL Cholesterol: 64 mg/dL      Total Cholesterol: 162 mg/dL            Reviewed and updated as needed this visit by Provider                    Lab work is in process  Labs reviewed in EPIC  Current providers sharing in care for this patient include:  Patient Care Team:  Cameron Kelly MD as PCP - General  Virginia Moody PA-C as Physician Assistant (Dermatology)  Cameron Kelly MD as Assigned PCP  Romina Gamez PA-C as Assigned Surgical Provider    The following health maintenance items are reviewed in Epic and correct as of today:  Health Maintenance   Topic Date Due    ZOSTER IMMUNIZATION (1 of 2) Never done    RSV VACCINE (Pregnancy & 60+) (1 - 1-dose 60+ series) Never done    COVID-19 Vaccine (3 -  season) 2023    DTAP/TDAP/TD IMMUNIZATION (2 - Td or Tdap) 2023    BMP  2024    LIPID  2024    ANNUAL REVIEW OF HM ORDERS  2024    MEDICARE ANNUAL WELLNESS VISIT  2024    INFLUENZA VACCINE (Season  "Ended) 09/01/2024    FALL RISK ASSESSMENT  05/31/2025    GLUCOSE  05/23/2026    ADVANCE CARE PLANNING  05/23/2028    HEPATITIS C SCREENING  Completed    PHQ-2 (once per calendar year)  Completed    Pneumococcal Vaccine: 65+ Years  Completed    IPV IMMUNIZATION  Aged Out    HPV IMMUNIZATION  Aged Out    MENINGITIS IMMUNIZATION  Aged Out    RSV MONOCLONAL ANTIBODY  Aged Out    COLORECTAL CANCER SCREENING  Discontinued    LUNG CANCER SCREENING  Discontinued         Review of Systems  CONSTITUTIONAL: NEGATIVE for fever, chills, change in weight  INTEGUMENTARY/SKIN: NEGATIVE for worrisome rashes, moles or lesions  EYES: NEGATIVE for vision changes or irritation  ENT/MOUTH: NEGATIVE for ear, mouth and throat problems  RESP: NEGATIVE for significant cough or SOB  BREAST: NEGATIVE for masses, tenderness or discharge  CV: NEGATIVE for chest pain, palpitations or peripheral edema  GI: NEGATIVE for nausea, abdominal pain, heartburn, or change in bowel habits  : NEGATIVE for frequency, dysuria, or hematuria  MUSCULOSKELETAL: NEGATIVE for significant arthralgias or myalgia  NEURO: NEGATIVE for weakness, dizziness or paresthesias  ENDOCRINE: NEGATIVE for temperature intolerance, skin/hair changes  HEME: NEGATIVE for bleeding problems  PSYCHIATRIC: NEGATIVE for changes in mood or affect     Objective    Exam  /65 (BP Location: Left arm, Cuff Size: Adult Regular)   Pulse 54   Temp 98  F (36.7  C) (Tympanic)   Resp 16   Ht 1.81 m (5' 11.25\")   Wt 92.8 kg (204 lb 9.6 oz)   SpO2 99%   BMI 28.34 kg/m     Estimated body mass index is 28.34 kg/m  as calculated from the following:    Height as of this encounter: 1.81 m (5' 11.25\").    Weight as of this encounter: 92.8 kg (204 lb 9.6 oz).    Physical Exam  GENERAL: alert and no distress  EYES: Eyes grossly normal to inspection, PERRL and conjunctivae and sclerae normal  HENT: ear canals and TM's normal, nose and mouth without ulcers or lesions  NECK: no adenopathy, no " asymmetry, masses, or scars  RESP: lungs clear to auscultation - no rales, rhonchi or wheezes  CV: regular rate and rhythm, normal S1 S2, no S3 or S4, no murmur, click or rub, no peripheral edema  ABDOMEN: soft, nontender, no hepatosplenomegaly, no masses and bowel sounds normal  MS: no gross musculoskeletal defects noted, no edema  SKIN: no suspicious lesions or rashes  NEURO: Normal strength and tone, mentation intact and speech normal  PSYCH: mentation appears normal, affect normal/bright         5/31/2024   Mini Cog   Clock Draw Score 2 Normal   3 Item Recall 3 objects recalled   Mini Cog Total Score 5              Signed Electronically by: Cameron Klely MD

## 2024-06-02 LAB
ALBUMIN SERPL BCG-MCNC: 4.6 G/DL (ref 3.5–5.2)
ALP SERPL-CCNC: 69 U/L (ref 40–150)
ALT SERPL W P-5'-P-CCNC: 23 U/L (ref 0–70)
ANION GAP SERPL CALCULATED.3IONS-SCNC: 9 MMOL/L (ref 7–15)
AST SERPL W P-5'-P-CCNC: 27 U/L (ref 0–45)
BILIRUB SERPL-MCNC: 0.5 MG/DL
BUN SERPL-MCNC: 18.8 MG/DL (ref 8–23)
CALCIUM SERPL-MCNC: 9.8 MG/DL (ref 8.8–10.2)
CHLORIDE SERPL-SCNC: 108 MMOL/L (ref 98–107)
CHOLEST SERPL-MCNC: 172 MG/DL
CREAT SERPL-MCNC: 1.26 MG/DL (ref 0.67–1.17)
DEPRECATED HCO3 PLAS-SCNC: 23 MMOL/L (ref 22–29)
EGFRCR SERPLBLD CKD-EPI 2021: 58 ML/MIN/1.73M2
FASTING STATUS PATIENT QL REPORTED: YES
FASTING STATUS PATIENT QL REPORTED: YES
GLUCOSE SERPL-MCNC: 102 MG/DL (ref 70–99)
HDLC SERPL-MCNC: 59 MG/DL
LDLC SERPL CALC-MCNC: 94 MG/DL
NONHDLC SERPL-MCNC: 113 MG/DL
POTASSIUM SERPL-SCNC: 5.6 MMOL/L (ref 3.4–5.3)
PROT SERPL-MCNC: 7.2 G/DL (ref 6.4–8.3)
PSA SERPL DL<=0.01 NG/ML-MCNC: 0.6 NG/ML (ref 0–6.5)
SODIUM SERPL-SCNC: 140 MMOL/L (ref 135–145)
TRIGL SERPL-MCNC: 93 MG/DL
TSH SERPL DL<=0.005 MIU/L-ACNC: 1.3 UIU/ML (ref 0.3–4.2)

## 2024-06-07 ENCOUNTER — LAB (OUTPATIENT)
Dept: LAB | Facility: CLINIC | Age: 79
End: 2024-06-07
Payer: COMMERCIAL

## 2024-06-07 DIAGNOSIS — E87.5 HYPERKALEMIA: ICD-10-CM

## 2024-06-07 PROCEDURE — 36415 COLL VENOUS BLD VENIPUNCTURE: CPT

## 2024-06-07 PROCEDURE — 80048 BASIC METABOLIC PNL TOTAL CA: CPT

## 2024-06-07 NOTE — LETTER
Lis 10, 2024      Yan Hurst  [address] [facility] [address]        Dear ,    We are writing to inform you of your test results.    Your results are within normal limits.    Resulted Orders   Basic metabolic panel  (Ca, Cl, CO2, Creat, Gluc, K, Na, BUN)   Result Value Ref Range    Sodium 138 135 - 145 mmol/L      Comment:      Reference intervals for this test were updated on 09/26/2023 to more accurately reflect our healthy population. There may be differences in the flagging of prior results with similar values performed with this method. Interpretation of those prior results can be made in the context of the updated reference intervals.     Potassium 4.6 3.4 - 5.3 mmol/L    Chloride 105 98 - 107 mmol/L    Carbon Dioxide (CO2) 24 22 - 29 mmol/L    Anion Gap 9 7 - 15 mmol/L    Urea Nitrogen 19.6 8.0 - 23.0 mg/dL    Creatinine 1.15 0.67 - 1.17 mg/dL    GFR Estimate 65 >60 mL/min/1.73m2    Calcium 9.7 8.8 - 10.2 mg/dL    Glucose 86 70 - 99 mg/dL       If you have any questions or concerns, please call the clinic at the number listed above.       Sincerely,      Cameron Kelly MD

## 2024-06-08 LAB
ANION GAP SERPL CALCULATED.3IONS-SCNC: 9 MMOL/L (ref 7–15)
BUN SERPL-MCNC: 19.6 MG/DL (ref 8–23)
CALCIUM SERPL-MCNC: 9.7 MG/DL (ref 8.8–10.2)
CHLORIDE SERPL-SCNC: 105 MMOL/L (ref 98–107)
CREAT SERPL-MCNC: 1.15 MG/DL (ref 0.67–1.17)
DEPRECATED HCO3 PLAS-SCNC: 24 MMOL/L (ref 22–29)
EGFRCR SERPLBLD CKD-EPI 2021: 65 ML/MIN/1.73M2
GLUCOSE SERPL-MCNC: 86 MG/DL (ref 70–99)
POTASSIUM SERPL-SCNC: 4.6 MMOL/L (ref 3.4–5.3)
SODIUM SERPL-SCNC: 138 MMOL/L (ref 135–145)

## 2024-06-20 ENCOUNTER — HOSPITAL ENCOUNTER (OUTPATIENT)
Dept: CT IMAGING | Facility: CLINIC | Age: 79
Discharge: HOME | End: 2024-06-20
Attending: INTERNAL MEDICINE | Admitting: INTERNAL MEDICINE
Payer: COMMERCIAL

## 2024-06-20 DIAGNOSIS — Z00.00 ENCOUNTER FOR PREVENTATIVE ADULT HEALTH CARE EXAMINATION: ICD-10-CM

## 2024-06-20 DIAGNOSIS — R91.8 PULMONARY NODULES: ICD-10-CM

## 2024-06-20 PROCEDURE — 71250 CT THORAX DX C-: CPT

## 2025-01-29 ENCOUNTER — TRANSFERRED RECORDS (OUTPATIENT)
Dept: HEALTH INFORMATION MANAGEMENT | Facility: CLINIC | Age: 80
End: 2025-01-29
Payer: COMMERCIAL

## 2025-05-21 ENCOUNTER — OFFICE VISIT (OUTPATIENT)
Dept: DERMATOLOGY | Facility: CLINIC | Age: 80
End: 2025-05-21
Payer: COMMERCIAL

## 2025-05-21 DIAGNOSIS — L81.4 LENTIGINES: ICD-10-CM

## 2025-05-21 DIAGNOSIS — D48.5 NEOPLASM OF UNCERTAIN BEHAVIOR OF SKIN: Primary | ICD-10-CM

## 2025-05-21 DIAGNOSIS — L82.0 INFLAMED SEBORRHEIC KERATOSIS: ICD-10-CM

## 2025-05-21 DIAGNOSIS — L57.0 ACTINIC KERATOSIS: ICD-10-CM

## 2025-05-21 DIAGNOSIS — L57.8 ACTINIC SKIN DAMAGE: ICD-10-CM

## 2025-05-21 NOTE — LETTER
5/21/2025      Guillermo Hurst  [address] [facility] [address]      Dear Colleague,    Thank you for referring your patient, Guillermo Hurst, to the [facility]. Please see a copy of my visit note below.    [facility] Dermatology Note    Encounter Date: May 21, 2025    Dermatology Problem List:      Major PMHx  -   ______________________________________    Impression/Plan:  Yan was seen today for skin check.    Diagnoses and all orders for this visit:    Neoplasm of uncertain behavior of skin  -     EXC BENIGN SKIN LESION SCLP/NCK/HNDS/FEET/GEN 0.6-1.0 CM  -     20074 - NHFG <=2.5 CM  - 6mm post neck  - see procedure note    Inflamed seborrheic keratosis  -     DESTRUCT BENIGN LESION, UP TO 14  - see procedure note    Actinic keratosis  -     [place] DESTRUCT PREMALIGNANT LESION, FIRST [[id number]]  -     [place] DESTRUCT PREMALIGNANT LESION, 2-14 [[id number]]  - discussed efudex vs LN2  - prefers LN2   - see procedure note    Actinic skin damage  Lentigines  - Reviewed the compounding benefits of incremental changes to sun protective behaviors including increased frequency of sunscreen and sun protective clothing like broad brimmed hats and longsleeved UPF containing clothing        Cryotherapy procedure note: After verbal consent and discussion of risks and benefits including but no limited to dyspigmentation/scar, blister, and pain, 1 ISK R temple and 5 aks on face was(were) treated with 1-2mm freeze border for 2 cycles with liquid nitrogen. Post cryotherapy instructions were provided.     After discussion of benefits and risks including but not limited to bleeding, infection, scar, incomplete removal, recurrence, and non-diagnostic excision, written consent and photographs were obtained. The area was cleaned with isopropyl alcohol. 1 mL of 1% lidocaine with epinephrine was injected to obtain adequate anesthesia of the lesion on the neck . A 4 mm punch excision was  performed. 4-0 monocryl sutures using an interrupted vertical mattress technique were utilized to approximate the deep dermal and subcutaneous tissues. 4-0 ethilon sutures were used to approximate the epidermal edges. White petrolatum ointment and a bandage was applied to the wound. Explicit verbal and written wound care instructions were provided. The patient left the dermatology clinic in good condition.      Follow-up in 1 year.       Staff Involved:  Staff Only    Alec Emery MD   of Dermatology  Department of Dermatology  [facility]      CC:   Chief Complaint   Patient presents with     Skin Check     FBS  2. Lesion-back of neck-present for many years-has not tried any medication to the area       History of Present Illness:  Mr. Guillermo Hurst is a 79 year old male who presents as a new patient.    Pt presents today for concerns about spots on trunk and extremities     Spot on back of neck. Getting firmer, would like it removed     Labs:      Physical exam:  Vitals: There were no vitals taken for this visit.  GEN: well developed, well-nourished, in no acute distress, in a pleasant mood.     SKIN: Evangelista phototype 1  - Full skin, which includes the head/face, both arms, chest, back, abdomen,both legs, genitalia and/or groin buttocks, digits and/or nails, was examined.  - soft subcutaneous mobile nodule post neck  - Flat brown macules and patches in a sun exposed areas on face and extremities  - scattered brown papules on trunk and extremities   - Stuck on brown papules on trunk and extremities   - gritty pink papules on face  - No other lesions of concern on areas examined.     Past Medical History:   Past Medical History:   Diagnosis Date     Arthritis August 2017    Hips, noted when xrays done for torn miniscus     Cancer (H) November 2017    Basal cell carcinova, removed skin by right ear     Essential hypertension, benign     Hypertension, Benign      LBBB (left bundle branch block)      Nephritis and nephropathy, not specified as acute or chronic, with unspecified pathological lesion in kidney      Vertigo     has had multiple ear surgeries- told due to this by ENT     VIRAL PERICARDITIS     resolved     Past Surgical History:   Procedure Laterality Date     ARTHROSCOPY KNEE Right 2017    Procedure: ARTHROSCOPY KNEE;  Arthroscopic partial medial meniscectomy, right knee;  Surgeon: Russell Crawford MD;  Location: [facility] OR     BIOPSY  10/27/17    Basal Cell Carcinoma in sideburn right ear     COLONOSCOPY       COLONOSCOPY N/A 2019    Procedure: COLONOSCOPY;  Surgeon: Jese Cantu MD;  Location:  GI     ENT SURGERY      Both ears     ZZC BORGES W/O FACETEC FORAMOT/DSKC  VRT SEG, LUMBAR  1975    laminectomy     ZZC BORGES W/O FACETEC FORAMOT/DSKC  VRT SEG, LUMBAR  1985    laminectomy     Z NONSPECIFIC PROCEDURE  1983    R ear surgery due to recurrent infections     [facility] NONSPECIFIC PROCEDURE  2002    LAMINECTOMY AND SPINAL FUSION , FLEXIBLE STRUT , 3 LEVELS       Social History:   reports that he quit smoking about 39 years ago. His smoking use included cigarettes. He started smoking about 59 years ago. He has a 20 pack-year smoking history. He has never used smokeless tobacco. He reports current alcohol use. He reports that he does not use drugs.    Family History:  Family History   Problem Relation Age of Onset     Cardiovascular Brother         b:  hx bypass, HTN     C.A.D. Brother      Coronary Artery Disease Brother               Hypertension Brother      Other Cancer Brother      Respiratory Sister         b:1932  asthma and allergies     Diabetes Sister      Cardiovascular Sister         pacemaker and bypass surgery     Coronary Artery Disease Sister      Hypertension Sister      Asthma Sister      Circulatory Sister         b:1936 Raynaud's phenomenon     Cancer Brother         d:age 55  " hx esophogeal then liver cancer.     Alcohol/Drug Brother         alcoholic - sober at time of death     Obesity Brother      C.A.D. Brother      Hypertension Brother               Chemical Addiction Brother      Other Cancer Brother         Esophageal/liver     Cancer Brother         brain cancer     C.A.D. Brother      Coronary Artery Disease Brother               Family History Negative Sister         b:194     Circulatory Mother         b:1908  hx of L leg amputation due to circulatory problems in , HTN     Hypertension Mother      Cardiovascular Father         d:age 53 after second MI     Alcohol/Drug Father         alcoholic, sober 5 yrs prior to death     Coronary Artery Disease Father               Cancer Paternal Grandmother         lung cancer       Medications:  Current Outpatient Medications   Medication Sig Dispense Refill     diclofenac (VOLTAREN) 1 % topical gel Place 4 g onto the skin 4 times daily 1 Tube 11     lisinopril (ZESTRIL) 20 MG tablet Take 1 tablet (20 mg) by mouth daily 90 tablet 3     sildenafil (VIAGRA) 50 MG tablet Take 1-2 tablets ( mg) by mouth daily as needed (ED) 15 tablet 0     simvastatin (ZOCOR) 20 MG tablet Take 1 tablet (20 mg) by mouth at bedtime 90 tablet 3     vitamin D3 (CHOLECALCIFEROL) 50 mcg (2000 units) tablet        Allergies   Allergen Reactions     Iodine      \"puffed up\"     Penicillins      rash               Again, thank you for allowing me to participate in the care of your patient.        Sincerely,        Alec Emery MD    Electronically signed"

## 2025-05-21 NOTE — PROGRESS NOTES
[facility] Dermatology Note    Encounter Date: May 21, 2025    Dermatology Problem List:  - ***    Major PMHx  -   ______________________________________    Impression/Plan:  There are no diagnoses linked to this encounter.    {Procedure:469253}    Follow-up in ***.       Staff Involved:  Staff Only    Alec Emery MD   of Dermatology  Department of Dermatology  [facility]      CC:   Chief Complaint   Patient presents with    Skin Check     FBSC  2. Lesion-back of neck-present for many years-has not tried any medication to the area       History of Present Illness:  Mr. Guillermo Hurst is a 79 year old male who presents as a new patient.    Pt presents today for concerns about spots on trunk and extremities     Spot on neck has hardened     Labs:      Physical exam:  Vitals: There were no vitals taken for this visit.  GEN: well developed, well-nourished, in no acute distress, in a pleasant mood.     SKIN: Evangelista phototype ***  - {Skin Exam:830606}  ***  - No other lesions of concern on areas examined.     Past Medical History:   Past Medical History:   Diagnosis Date    Arthritis August 2017    Hips, noted when xrays done for torn miniscus    Cancer (H) November 2017    Basal cell carcinova, removed skin by right ear    Essential hypertension, benign     Hypertension, Benign    LBBB (left bundle branch block)     Nephritis and nephropathy, not specified as acute or chronic, with unspecified pathological lesion in kidney     Vertigo     has had multiple ear surgeries- told due to this by ENT    VIRAL PERICARDITIS 1975    resolved     Past Surgical History:   Procedure Laterality Date    ARTHROSCOPY KNEE Right 08/28/2017    Procedure: ARTHROSCOPY KNEE;  Arthroscopic partial medial meniscectomy, right knee;  Surgeon: Russell Crawford MD;  Location: [facility] OR    BIOPSY  10/27/17    Basal Cell Carcinoma in sideburn right ear    COLONOSCOPY       COLONOSCOPY N/A 2019    Procedure: COLONOSCOPY;  Surgeon: Jese Cantu MD;  Location:  GI    ENT SURGERY      Both ears    [facility] BORGES W/O FACETEC FORAMOT/DSKC 1/ VRT SEG, LUMBAR  1975    laminectomy    [facility] BORGES W/O FACETEC FORAMOT/DSKC 1/2 VRT SEG, LUMBAR  1985    laminectomy    [facility] NONSPECIFIC PROCEDURE  1983    R ear surgery due to recurrent infections    [facility] NONSPECIFIC PROCEDURE  2002    LAMINECTOMY AND SPINAL FUSION , FLEXIBLE STRUT , 3 LEVELS       Social History:   reports that he quit smoking about 39 years ago. His smoking use included cigarettes. He started smoking about 59 years ago. He has a 20 pack-year smoking history. He has never used smokeless tobacco. He reports current alcohol use. He reports that he does not use drugs.    Family History:  Family History   Problem Relation Age of Onset    Cardiovascular Brother         b:192  hx bypass, HTN    C.A.D. Brother     Coronary Artery Disease Brother              Hypertension Brother     Other Cancer Brother     Respiratory Sister         b:193  asthma and allergies    Diabetes Sister     Cardiovascular Sister         pacemaker and bypass surgery    Coronary Artery Disease Sister     Hypertension Sister     Asthma Sister     Circulatory Sister         b:1936 Raynaud's phenomenon    Cancer Brother         d:age 55  hx esophogeal then liver cancer.    Alcohol/Drug Brother         alcoholic - sober at time of death    Obesity Brother     C.A.D. Brother     Hypertension Brother              Chemical Addiction Brother     Other Cancer Brother         Esophageal/liver    Cancer Brother         brain cancer    C.A.D. Brother     Coronary Artery Disease Brother              Family History Negative Sister         b:1945    Circulatory Mother         b:[birth year]08  hx of L leg amputation due to circulatory problems in , HTN    Hypertension Mother     Cardiovascular Father         d:age 53  "after second MI    Alcohol/Drug Father         alcoholic, sober 5 yrs prior to death    Coronary Artery Disease Father              Cancer Paternal Grandmother         lung cancer       Medications:  Current Outpatient Medications   Medication Sig Dispense Refill    diclofenac (VOLTAREN) 1 % topical gel Place 4 g onto the skin 4 times daily 1 Tube 11    lisinopril (ZESTRIL) 20 MG tablet Take 1 tablet (20 mg) by mouth daily 90 tablet 3    sildenafil (VIAGRA) 50 MG tablet Take 1-2 tablets ( mg) by mouth daily as needed (ED) 15 tablet 0    simvastatin (ZOCOR) 20 MG tablet Take 1 tablet (20 mg) by mouth at bedtime 90 tablet 3    vitamin D3 (CHOLECALCIFEROL) 50 mcg (2000 units) tablet        Allergies   Allergen Reactions    Iodine      \"puffed up\"    Penicillins      rash                 "

## 2025-05-21 NOTE — PATIENT INSTRUCTIONS
Wound Care After a Biopsy    What is a skin biopsy?  A skin biopsy allows the doctor to examine a very small piece of tissue under the microscope to determine the diagnosis and the best treatment for the skin condition. A local anesthetic (numbing medicine) is injected with a very small needle into the skin area to be tested. A small piece of skin is taken from the area. Sometimes a suture (stitch) is used.     What are the risks of a skin biopsy?  I will experience scar, bleeding, swelling, pain, crusting and redness. I may experience incomplete removal or recurrence. Risks of this procedure are excessive bleeding, bruising, infection, nerve damage, numbness, thick (hypertrophic or keloidal) scar and non-diagnostic biopsy.    How should I care for my wound for the first 24 hours?  Keep the wound dry and covered for 24 hours  If it bleeds, hold direct pressure on the area for 15 minutes. If bleeding does not stop, call us or go to the emergency room  Avoid strenuous exercise the first 1-2 days or as your doctor instructs you    How should I care for the wound after 24 hours?  After 24 hours, remove the bandage  You may bathe or shower as normal  If you had a scalp biopsy, you can shampoo as usual and can use shower water to clean the biopsy site daily  Clean the wound once a day with gentle soap and water  Do not scrub, be gentle  Apply white petroleum/Vaseline after cleaning the wound with a cotton swab or a clean finger, and keep the site covered with a Bandaid /bandage. Bandages are not necessary with a scalp biopsy  If you are unable to cover the site with a Bandaid /bandage, re-apply ointment 2-3 times a day to keep the site moist. Moisture will help with healing  Avoid strenuous activity for first 1-2 days  Avoid lakes, rivers, pools, and oceans until the stitches are removed or the site is healed    How do I clean my wound?  Wash hands thoroughly with soap or use hand  before all wound care  Clean  the wound with gentle soap and water  Apply white petroleum/Vaseline  to wound after it is clean  Replace the Bandaid /bandage to keep the wound covered for the first few days or as instructed by your doctor  If you had a scalp biopsy, warm shower water to the area on a daily basis should suffice    What should I use to clean my wound?   Cotton-tipped applicators (Qtips )  White petroleum jelly (Vaseline ). Use a clean new container and use Q-tips to apply.  Bandaids  as needed  Gentle soap     How should I care for my wound long term?  Do not get your wound dirty  Keep up with wound care for one week or until the area is healed.  If you have stitches, stitches need to be removed in 7 days. You may return to our clinic for this or you may have it done locally at your doctor s office.  A small scab will form and fall off by itself when the area is completely healed. The area will be red and will become pink in color as it heals. Sun protection is very important for how your scar will turn out. Sunscreen with an SPF 30 or greater is recommended once the area is healed.  You should have some soreness but it should be mild and slowly go away over several days. Talk to your doctor about using tylenol for pain,    When should I call my doctor?  If you have increased:   Pain or swelling  Pus or drainage (clear or slightly yellow drainage is ok)  Temperature over 100F  Spreading redness or warmth around wound    When will I hear about my results?  The biopsy results can take 2 weeks to come back.  Your results will automatically release to [employer] before your provider has even reviewed them.  The clinic will call you with the results, send you a [employer] message, or have you schedule a follow-up clinic or phone time to discuss the results.  Contact our clinics if you do not hear from us in 2 weeks.    Who should I call with questions?  [facility]: [phone]  [facility]  [place]: [phone]  For urgent needs outside of business hours call the [facility] at [phone] and ask for the dermatology resident on call

## 2025-05-21 NOTE — PROGRESS NOTES
[facility] Dermatology Note    Encounter Date: May 21, 2025    Dermatology Problem List:      Major PMHx  -   ______________________________________    Impression/Plan:  Yan was seen today for skin check.    Diagnoses and all orders for this visit:    Neoplasm of uncertain behavior of skin  -     EXC BENIGN SKIN LESION SCLP/NCK/HNDS/FEET/GEN 0.6-1.0 CM  -     41260 - NHFG <=2.5 CM  - 6mm post neck  - see procedure note    Inflamed seborrheic keratosis  -     DESTRUCT BENIGN LESION, UP TO 14  - see procedure note    Actinic keratosis  -     NY DESTRUCT PREMALIGNANT LESION, FIRST [1386512]  -     NY DESTRUCT PREMALIGNANT LESION, 2-14 [[id number]]  - discussed efudex vs LN2  - prefers LN2   - see procedure note    Actinic skin damage  Lentigines  - Reviewed the compounding benefits of incremental changes to sun protective behaviors including increased frequency of sunscreen and sun protective clothing like broad brimmed hats and longsleeved UPF containing clothing        Cryotherapy procedure note: After verbal consent and discussion of risks and benefits including but no limited to dyspigmentation/scar, blister, and pain, 1 ISK R temple and 5 aks on face was(were) treated with 1-2mm freeze border for 2 cycles with liquid nitrogen. Post cryotherapy instructions were provided.     After discussion of benefits and risks including but not limited to bleeding, infection, scar, incomplete removal, recurrence, and non-diagnostic excision, written consent and photographs were obtained. The area was cleaned with isopropyl alcohol. 1 mL of 1% lidocaine with epinephrine was injected to obtain adequate anesthesia of the lesion on the neck . A 4 mm punch excision was performed. 4-0 monocryl sutures using an interrupted vertical mattress technique were utilized to approximate the deep dermal and subcutaneous tissues. 4-0 ethilon sutures were used to approximate the epidermal edges. White petrolatum ointment and a  bandage was applied to the wound. Explicit verbal and written wound care instructions were provided. The patient left the dermatology clinic in good condition.      Follow-up in 1 year.       Staff Involved:  Staff Only    Alec Emery MD   of Dermatology  Department of Dermatology  [facility]      CC:   Chief Complaint   Patient presents with    Skin Check     FBS  2. Lesion-back of neck-present for many years-has not tried any medication to the area       History of Present Illness:  Mr. Guillermo Hurst is a 79 year old male who presents as a new patient.    Pt presents today for concerns about spots on trunk and extremities     Spot on back of neck. Getting firmer, would like it removed     Labs:      Physical exam:  Vitals: There were no vitals taken for this visit.  GEN: well developed, well-nourished, in no acute distress, in a pleasant mood.     SKIN: Evangelista phototype 1  - Full skin, which includes the head/face, both arms, chest, back, abdomen,both legs, genitalia and/or groin buttocks, digits and/or nails, was examined.  - soft subcutaneous mobile nodule post neck  - Flat brown macules and patches in a sun exposed areas on face and extremities  - scattered brown papules on trunk and extremities   - Stuck on brown papules on trunk and extremities   - gritty pink papules on face  - No other lesions of concern on areas examined.     Past Medical History:   Past Medical History:   Diagnosis Date    Arthritis August 2017    Hips, noted when xrays done for torn miniscus    Cancer (H) November 2017    Basal cell carcinova, removed skin by right ear    Essential hypertension, benign     Hypertension, Benign    LBBB (left bundle branch block)     Nephritis and nephropathy, not specified as acute or chronic, with unspecified pathological lesion in kidney     Vertigo     has had multiple ear surgeries- told due to this by ENT    VIRAL PERICARDITIS 1975     resolved     Past Surgical History:   Procedure Laterality Date    ARTHROSCOPY KNEE Right 2017    Procedure: ARTHROSCOPY KNEE;  Arthroscopic partial medial meniscectomy, right knee;  Surgeon: Russell Crawford MD;  Location: [facility] OR    BIOPSY  10/27/17    Basal Cell Carcinoma in sideburn right ear    COLONOSCOPY      COLONOSCOPY N/A 2019    Procedure: COLONOSCOPY;  Surgeon: Jese Cantu MD;  Location:  GI    ENT SURGERY      Both ears    ZZC BORGES W/O FACETEC FORAMOT/DSKC  VRT SEG, LUMBAR  1975    laminectomy    ZZC BORGES W/O FACETEC FORAMOT/DSKC /2 VRT SEG, LUMBAR  1985    laminectomy    ZC NONSPECIFIC PROCEDURE  1983    R ear surgery due to recurrent infections    [facility] NONSPECIFIC PROCEDURE  2002    LAMINECTOMY AND SPINAL FUSION , FLEXIBLE STRUT , 3 LEVELS       Social History:   reports that he quit smoking about 39 years ago. His smoking use included cigarettes. He started smoking about 59 years ago. He has a 20 pack-year smoking history. He has never used smokeless tobacco. He reports current alcohol use. He reports that he does not use drugs.    Family History:  Family History   Problem Relation Age of Onset    Cardiovascular Brother         b:  hx bypass, HTN    C.A.D. Brother     Coronary Artery Disease Brother              Hypertension Brother     Other Cancer Brother     Respiratory Sister         b:193  asthma and allergies    Diabetes Sister     Cardiovascular Sister         pacemaker and bypass surgery    Coronary Artery Disease Sister     Hypertension Sister     Asthma Sister     Circulatory Sister         b:1936 Raynaud's phenomenon    Cancer Brother         d:age 55  hx esophogeal then liver cancer.    Alcohol/Drug Brother         alcoholic - sober at time of death    Obesity Brother     C.A.D. Brother     Hypertension Brother              Chemical Addiction Brother     Other Cancer Brother         Esophageal/liver    Cancer  "Brother         brain cancer    OUMAR Brother     Coronary Artery Disease Brother              Family History Negative Sister         b:194    Circulatory Mother         b:1908  hx of L leg amputation due to circulatory problems in , HTN    Hypertension Mother     Cardiovascular Father         d:age 53 after second MI    Alcohol/Drug Father         alcoholic, sober 5 yrs prior to death    Coronary Artery Disease Father              Cancer Paternal Grandmother         lung cancer       Medications:  Current Outpatient Medications   Medication Sig Dispense Refill    diclofenac (VOLTAREN) 1 % topical gel Place 4 g onto the skin 4 times daily 1 Tube 11    lisinopril (ZESTRIL) 20 MG tablet Take 1 tablet (20 mg) by mouth daily 90 tablet 3    sildenafil (VIAGRA) 50 MG tablet Take 1-2 tablets ( mg) by mouth daily as needed (ED) 15 tablet 0    simvastatin (ZOCOR) 20 MG tablet Take 1 tablet (20 mg) by mouth at bedtime 90 tablet 3    vitamin D3 (CHOLECALCIFEROL) 50 mcg (2000 units) tablet        Allergies   Allergen Reactions    Iodine      \"puffed up\"    Penicillins      rash               "

## 2025-05-27 ENCOUNTER — RESULTS FOLLOW-UP (OUTPATIENT)
Dept: DERMATOLOGY | Facility: CLINIC | Age: 80
End: 2025-05-27

## 2025-05-28 ENCOUNTER — ALLIED HEALTH/NURSE VISIT (OUTPATIENT)
Dept: DERMATOLOGY | Facility: CLINIC | Age: 80
End: 2025-05-28
Payer: COMMERCIAL

## 2025-05-28 DIAGNOSIS — Z48.00 DRESSING CHANGE: Primary | ICD-10-CM

## 2025-05-28 NOTE — PROGRESS NOTES
Guillermo Hurst comes into clinic today at the request of   Ordering Provider for Suture Removal:  Incision was dry, clean and intact, incision cleansed with wound cleanser and sutures were removed. Pt tolerated the procedure. Benzoin and steristrips were placed per protocol and pt was instructed to leave them in place for 7-10 days. It is okay to shower with these in place, no need to cover. After this time the strerstrips will start loosen and should be removed.   .        This service provided today was under the supervising provider of the day Dr. Emery, who was available if needed.    [facility]

## 2025-06-02 SDOH — HEALTH STABILITY: PHYSICAL HEALTH: ON AVERAGE, HOW MANY DAYS PER WEEK DO YOU ENGAGE IN MODERATE TO STRENUOUS EXERCISE (LIKE A BRISK WALK)?: 3 DAYS

## 2025-06-02 SDOH — HEALTH STABILITY: PHYSICAL HEALTH: ON AVERAGE, HOW MANY MINUTES DO YOU ENGAGE IN EXERCISE AT THIS LEVEL?: 70 MIN

## 2025-06-02 ASSESSMENT — SOCIAL DETERMINANTS OF HEALTH (SDOH): HOW OFTEN DO YOU GET TOGETHER WITH FRIENDS OR RELATIVES?: TWICE A WEEK

## 2025-06-03 ENCOUNTER — OFFICE VISIT (OUTPATIENT)
Dept: INTERNAL MEDICINE | Facility: CLINIC | Age: 80
End: 2025-06-03
Attending: INTERNAL MEDICINE
Payer: COMMERCIAL

## 2025-06-03 VITALS
TEMPERATURE: 97.1 F | BODY MASS INDEX: 28.81 KG/M2 | RESPIRATION RATE: 14 BRPM | DIASTOLIC BLOOD PRESSURE: 78 MMHG | WEIGHT: 205.8 LBS | HEIGHT: 71 IN | SYSTOLIC BLOOD PRESSURE: 137 MMHG | HEART RATE: 69 BPM | OXYGEN SATURATION: 96 %

## 2025-06-03 DIAGNOSIS — Z12.5 SCREENING FOR PROSTATE CANCER: ICD-10-CM

## 2025-06-03 DIAGNOSIS — Z00.00 ENCOUNTER FOR PREVENTATIVE ADULT HEALTH CARE EXAMINATION: Primary | ICD-10-CM

## 2025-06-03 DIAGNOSIS — R73.9 HYPERGLYCEMIA: ICD-10-CM

## 2025-06-03 DIAGNOSIS — E78.5 HYPERLIPIDEMIA LDL GOAL <100: ICD-10-CM

## 2025-06-03 DIAGNOSIS — M16.0 PRIMARY OSTEOARTHRITIS OF BOTH HIPS: ICD-10-CM

## 2025-06-03 DIAGNOSIS — Z13.6 SCREENING FOR CARDIOVASCULAR CONDITION: ICD-10-CM

## 2025-06-03 DIAGNOSIS — I10 ESSENTIAL HYPERTENSION, BENIGN: ICD-10-CM

## 2025-06-03 DIAGNOSIS — Z23 NEED FOR VACCINATION: ICD-10-CM

## 2025-06-03 LAB
ALBUMIN SERPL BCG-MCNC: 4.3 G/DL (ref 3.5–5.2)
ALBUMIN UR-MCNC: NEGATIVE MG/DL
ALP SERPL-CCNC: 70 U/L (ref 40–150)
ALT SERPL W P-5'-P-CCNC: 20 U/L (ref 0–70)
ANION GAP SERPL CALCULATED.3IONS-SCNC: 9 MMOL/L (ref 7–15)
APPEARANCE UR: CLEAR
AST SERPL W P-5'-P-CCNC: 24 U/L (ref 0–45)
BACTERIA #/AREA URNS HPF: ABNORMAL /HPF
BILIRUB SERPL-MCNC: 0.5 MG/DL
BILIRUB UR QL STRIP: NEGATIVE
BUN SERPL-MCNC: 19.5 MG/DL (ref 8–23)
CALCIUM SERPL-MCNC: 9.4 MG/DL (ref 8.8–10.4)
CHLORIDE SERPL-SCNC: 106 MMOL/L (ref 98–107)
CHOLEST SERPL-MCNC: 156 MG/DL
COLOR UR AUTO: YELLOW
CREAT SERPL-MCNC: 1.27 MG/DL (ref 0.67–1.17)
EGFRCR SERPLBLD CKD-EPI 2021: 57 ML/MIN/1.73M2
ERYTHROCYTE [DISTWIDTH] IN BLOOD BY AUTOMATED COUNT: 12.8 % (ref 10–15)
EST. AVERAGE GLUCOSE BLD GHB EST-MCNC: 111 MG/DL
FASTING STATUS PATIENT QL REPORTED: YES
FASTING STATUS PATIENT QL REPORTED: YES
GLUCOSE SERPL-MCNC: 97 MG/DL (ref 70–99)
GLUCOSE UR STRIP-MCNC: NEGATIVE MG/DL
HBA1C MFR BLD: 5.5 % (ref 0–5.6)
HCO3 SERPL-SCNC: 25 MMOL/L (ref 22–29)
HCT VFR BLD AUTO: 42.9 % (ref 40–53)
HDLC SERPL-MCNC: 53 MG/DL
HGB BLD-MCNC: 14.8 G/DL (ref 13.3–17.7)
HGB UR QL STRIP: NEGATIVE
KETONES UR STRIP-MCNC: NEGATIVE MG/DL
LDLC SERPL CALC-MCNC: 84 MG/DL
LEUKOCYTE ESTERASE UR QL STRIP: NEGATIVE
MCH RBC QN AUTO: 30.8 PG (ref 26.5–33)
MCHC RBC AUTO-ENTMCNC: 34.5 G/DL (ref 31.5–36.5)
MCV RBC AUTO: 89 FL (ref 78–100)
MUCOUS THREADS #/AREA URNS LPF: PRESENT /LPF
NITRATE UR QL: NEGATIVE
NONHDLC SERPL-MCNC: 103 MG/DL
PH UR STRIP: 6 [PH] (ref 5–7)
PLATELET # BLD AUTO: 241 10E3/UL (ref 150–450)
POTASSIUM SERPL-SCNC: 5.3 MMOL/L (ref 3.4–5.3)
PROT SERPL-MCNC: 7.1 G/DL (ref 6.4–8.3)
PSA SERPL DL<=0.01 NG/ML-MCNC: 1.26 NG/ML
RBC # BLD AUTO: 4.8 10E6/UL (ref 4.4–5.9)
RBC #/AREA URNS AUTO: ABNORMAL /HPF
SODIUM SERPL-SCNC: 140 MMOL/L (ref 135–145)
SP GR UR STRIP: 1.02 (ref 1–1.03)
SQUAMOUS #/AREA URNS AUTO: ABNORMAL /LPF
TRIGL SERPL-MCNC: 94 MG/DL
TSH SERPL DL<=0.005 MIU/L-ACNC: 1.37 UIU/ML (ref 0.3–4.2)
UROBILINOGEN UR STRIP-ACNC: 0.2 E.U./DL
WBC # BLD AUTO: 6.2 10E3/UL (ref 4–11)
WBC #/AREA URNS AUTO: ABNORMAL /HPF

## 2025-06-03 PROCEDURE — 80053 COMPREHEN METABOLIC PANEL: CPT | Performed by: INTERNAL MEDICINE

## 2025-06-03 PROCEDURE — 83036 HEMOGLOBIN GLYCOSYLATED A1C: CPT | Performed by: INTERNAL MEDICINE

## 2025-06-03 PROCEDURE — 36415 COLL VENOUS BLD VENIPUNCTURE: CPT | Performed by: INTERNAL MEDICINE

## 2025-06-03 PROCEDURE — 84443 ASSAY THYROID STIM HORMONE: CPT | Performed by: INTERNAL MEDICINE

## 2025-06-03 PROCEDURE — 1125F AMNT PAIN NOTED PAIN PRSNT: CPT | Performed by: INTERNAL MEDICINE

## 2025-06-03 PROCEDURE — G0103 PSA SCREENING: HCPCS | Performed by: INTERNAL MEDICINE

## 2025-06-03 PROCEDURE — 85027 COMPLETE CBC AUTOMATED: CPT | Performed by: INTERNAL MEDICINE

## 2025-06-03 PROCEDURE — 3044F HG A1C LEVEL LT 7.0%: CPT | Performed by: INTERNAL MEDICINE

## 2025-06-03 PROCEDURE — 81001 URINALYSIS AUTO W/SCOPE: CPT | Performed by: INTERNAL MEDICINE

## 2025-06-03 PROCEDURE — 99214 OFFICE O/P EST MOD 30 MIN: CPT | Mod: 25 | Performed by: INTERNAL MEDICINE

## 2025-06-03 PROCEDURE — 3075F SYST BP GE 130 - 139MM HG: CPT | Performed by: INTERNAL MEDICINE

## 2025-06-03 PROCEDURE — 3078F DIAST BP <80 MM HG: CPT | Performed by: INTERNAL MEDICINE

## 2025-06-03 PROCEDURE — G0439 PPPS, SUBSEQ VISIT: HCPCS | Performed by: INTERNAL MEDICINE

## 2025-06-03 PROCEDURE — 80061 LIPID PANEL: CPT | Performed by: INTERNAL MEDICINE

## 2025-06-03 RX ORDER — LISINOPRIL 20 MG/1
20 TABLET ORAL DAILY
Qty: 90 TABLET | Refills: 3 | Status: SHIPPED | OUTPATIENT
Start: 2025-06-03

## 2025-06-03 RX ORDER — SIMVASTATIN 20 MG
20 TABLET ORAL AT BEDTIME
Qty: 90 TABLET | Refills: 3 | Status: SHIPPED | OUTPATIENT
Start: 2025-06-03

## 2025-06-03 ASSESSMENT — PAIN SCALES - GENERAL: PAINLEVEL_OUTOF10: MILD PAIN (2)

## 2025-06-03 NOTE — PROGRESS NOTES
Preventive Care Visit  [facility]  Cameron Kelly MD, Internal Medicine  Manny 3, 2025      Assessment & Plan     Need for vaccination    - Tdap, tetanus-diptheria-acell pertussis, (BOOSTRIX) 5-2.5-18.5 LF-MCG/0.5 KI injection; Inject 0.5 mLs into the muscle once for 1 dose.    Screening for cardiovascular condition  Assess lipids     Hyperlipidemia LDL goal <100  Continue treatment     - Lipid panel reflex to direct LDL Fasting  - simvastatin (ZOCOR) 20 MG tablet; Take 1 tablet (20 mg) by mouth at bedtime.  - OFFICE/OUTPT VISIT,EST,LEVL IV    Essential hypertension, benign  Controlled   Continue treatment   - lisinopril (ZESTRIL) 20 MG tablet; Take 1 tablet (20 mg) by mouth daily.  - CBC with platelets  - Comprehensive metabolic panel (BMP + Alb, Alk Phos, ALT, AST, Total. Bili, TP)  - TSH with free T4 reflex  - UA with Microscopic reflex to Culture - lab collect  - OFFICE/OUTPT VISIT,ESTLEVL IV    Encounter for preventative adult health care examination  advised regular aerobic activity, low cholesterol, low salt diet, wearing seat belt,  self examinations, sunscreen protection.Obtain screening cholesterol, immunizations reviewed.    - Tdap, tetanus-diptheria-acell pertussis, (BOOSTRIX) 5-2.5-18.5 LF-MCG/0.5 KI injection; Inject 0.5 mLs into the muscle once for 1 dose.  - Orthopedic  Referral; Future  - CBC with platelets  - Comprehensive metabolic panel (BMP + Alb, Alk Phos, ALT, AST, Total. Bili, TP)  - TSH with free T4 reflex  - PSA, screen  - Hemoglobin A1c  - UA with Microscopic reflex to Culture - lab collect    Screening for prostate cancer    - PSA, screen    Primary osteoarthritis of both hips  Refer for ortho   - Orthopedic  Referral; Future  - OFFICE/OUTPT VISIT,EST,LEVL IV    Hyperglycemia  Monitor   - Hemoglobin A1c  - OFFICE/OUTPT VISIT,ESTLEVL IV    Patient has been advised of split billing requirements and indicates understanding:  "Yes        BMI  Estimated body mass index is 28.7 kg/m  as calculated from the following:    Height as of this encounter: 1.803 m (5' 11\").    Weight as of this encounter: 93.4 kg (205 lb 12.8 oz).       Counseling  Appropriate preventive services were addressed with this patient via screening, questionnaire, or discussion as appropriate for fall prevention, nutrition, physical activity, Tobacco-use cessation, social engagement, weight loss and cognition.  Checklist reviewing preventive services available has been given to the patient.  Reviewed patient's diet, addressing concerns and/or questions.   He is at risk for lack of exercise and has been provided with information to increase physical activity for the benefit of his well-being.   Patient reported safety concerns were addressed today.    Follow-up    Follow-up Visit   Expected date:  Jun 03, 2026 (Approximate)      Follow Up Appointment Details:     Follow-up with whom?: Me    Follow-Up for what?: Adult Preventive    How?: In Person                 Subjective   Yan is a 79 year old, presenting for the following:  Wellness Visit (fasting)        6/3/2025     8:48 AM   Additional Questions   Roomed by Mavis REYES   Accompanied by n/a          HPI  Has h/o HTN. on medical treatment. BP has been controlled. No side effects from medications. No CP, HA, dizziness. good compliance with medications and low salt diet.  Has H/O hyperlipidemia. On medical treatment and diet. No side effects. No muscle weakness, myalgias or upset stomach.   Concern for pain in the hips with walking, has h/o OA. More symptoms on the left. Pain is on the lateral side of the hip. In 2022 X rays showed advanced OA.          Advance Care Planning    Discussed advance care planning with patient; informed AVS has link to [facility] [employer].        6/2/2025   General Health   How would you rate your overall physical health? Excellent   Feel stress (tense, anxious, or unable to sleep) Not at all "         6/2/2025   Nutrition   Diet: Regular (no restrictions)         6/2/2025   Exercise   Days per week of moderate/strenous exercise 3 days   Average minutes spent exercising at this level 70 min         6/2/2025   Social Factors   Frequency of gathering with friends or relatives Twice a week   Worry food won't last until get money to buy more No   Food not last or not have enough money for food? No   Do you have housing? (Housing is defined as stable permanent housing and does not include staying outside in a car, in a tent, in an abandoned building, in an overnight shelter, or couch-surfing.) Yes   Are you worried about losing your housing? No   Lack of transportation? No   Unable to get utilities (heat,electricity)? No         6/2/2025   Fall Risk   Fallen 2 or more times in the past year? No    No   Trouble with walking or balance? No    No       Multiple values from one day are sorted in reverse-chronological order          6/2/2025   Activities of Daily Living- Home Safety   Needs help with the following daily activites None of the above   Safety concerns in the home No grab bars in the bathroom         6/2/2025   Dental   Dentist two times every year? Yes         6/2/2025   Hearing Screening   Hearing concerns? None of the above         6/2/2025   Driving Risk Screening   Patient/family members have concerns about driving No         6/2/2025   General Alertness/Fatigue Screening   Have you been more tired than usual lately? No         6/2/2025   Urinary Incontinence Screening   Bothered by leaking urine in past 6 months No         Today's PHQ-2 Score:       6/2/2025     9:06 AM   PHQ-2 ( 1999 Pfizer)   Q1: Little interest or pleasure in doing things 0   Q2: Feeling down, depressed or hopeless 0   PHQ-2 Score 0    Q1: Little interest or pleasure in doing things Not at all   Q2: Feeling down, depressed or hopeless Not at all   PHQ-2 Score 0       Patient-reported           6/2/2025   Substance Use   Alcohol  more than 3/day or more than 7/wk No   Do you have a current opioid prescription? No   How severe/bad is pain from 1 to 10? 2/10   Do you use any other substances recreationally? No     Social History     Tobacco Use    Smoking status: Former     Current packs/day: 0.00     Average packs/day: 1 pack/day for 20.0 years (20.0 ttl pk-yrs)     Types: Cigarettes     Start date: 1965     Quit date: 1985     Years since quittin.8    Smokeless tobacco: Never   Vaping Use    Vaping status: Never Used   Substance Use Topics    Alcohol use: Yes     Comment: Avg (1.0 per day, wine, beer or cocktail)    Drug use: No                 Reviewed and updated as needed this visit by Provider                    Lab work is in process  Labs reviewed in EPIC  Current providers sharing in care for this patient include:  Patient Care Team:  Cameron Kelly MD as PCP - Virginia Lopez PA-C as Physician Assistant (Dermatology)  Cameron Kelly MD as Assigned PCP  Romina Gamez PA-C as Assigned Dermatology Provider    The following health maintenance items are reviewed in Epic and correct as of today:  Health Maintenance   Topic Date Due    ZOSTER VACCINE (1 of 2) Never done    RSV VACCINE (1 - 1-dose 75+ series) Never done    DTAP/TDAP/TD VACCINE (2 - Td or Tdap) 2023    COVID-19 VACCINE (3 -  season) 2024    LIPID  2025    ANNUAL REVIEW OF HM ORDERS  2025    MEDICARE ANNUAL WELLNESS VISIT  2025    BMP  2025    INFLUENZA VACCINE (Season Ended) 2025    FALL RISK ASSESSMENT  2026    DIABETES SCREENING  2027    ADVANCE CARE PLANNING  2030    PHQ-2 (once per calendar year)  Completed    PNEUMOCOCCAL VACCINE 50+ YEARS  Completed    HPV VACCINE  Aged Out    MENINGITIS VACCINE  Aged Out    COLORECTAL CANCER SCREENING  Discontinued    LUNG CANCER SCREENING  Discontinued         Review of Systems  Constitutional, HEENT, cardiovascular,  "pulmonary, GI, , musculoskeletal, neuro, skin, endocrine and psych systems are negative, except as otherwise noted.     Objective    Exam  /78 (BP Location: Left arm, Cuff Size: Adult Regular)   Pulse 69   Temp 97.1  F (36.2  C) (Oral)   Resp 14   Ht 1.803 m (5' 11\")   Wt 93.4 kg (205 lb 12.8 oz)   SpO2 96%   BMI 28.70 kg/m     Estimated body mass index is 28.7 kg/m  as calculated from the following:    Height as of this encounter: 1.803 m (5' 11\").    Weight as of this encounter: 93.4 kg (205 lb 12.8 oz).    Physical Exam  GENERAL: alert and no distress  EYES: Eyes grossly normal to inspection, PERRL and conjunctivae and sclerae normal  HENT: ear canals and TM's normal, nose and mouth without ulcers or lesions  NECK: no adenopathy, no asymmetry, masses, or scars  RESP: lungs clear to auscultation - no rales, rhonchi or wheezes  CV: regular rate and rhythm, normal S1 S2, no S3 or S4, no murmur, click or rub, no peripheral edema  ABDOMEN: soft, nontender, no hepatosplenomegaly, no masses and bowel sounds normal  MS: no gross musculoskeletal defects noted, no edema  SKIN: no suspicious lesions or rashes  NEURO: Normal strength and tone, mentation intact and speech normal  PSYCH: mentation appears normal, affect normal/bright         6/3/2025   Mini Cog   Clock Draw Score 2 Normal   3 Item Recall 3 objects recalled   Mini Cog Total Score 5              Signed Electronically by: Cameron Kelly MD    "

## 2025-06-04 ENCOUNTER — RESULTS FOLLOW-UP (OUTPATIENT)
Dept: INTERNAL MEDICINE | Facility: CLINIC | Age: 80
End: 2025-06-04

## 2025-06-04 ENCOUNTER — PATIENT OUTREACH (OUTPATIENT)
Dept: CARE COORDINATION | Facility: CLINIC | Age: 80
End: 2025-06-04
Payer: COMMERCIAL

## 2025-07-28 ENCOUNTER — MYC MEDICAL ADVICE (OUTPATIENT)
Dept: INTERNAL MEDICINE | Facility: CLINIC | Age: 80
End: 2025-07-28
Payer: COMMERCIAL

## 2025-07-28 NOTE — TELEPHONE ENCOUNTER
Sent [employer] message to patient.    Thank you,  Flakito, Triage RN Antwan Arellano    2:16 PM 7/28/2025

## 2025-07-28 NOTE — TELEPHONE ENCOUNTER
Call to patient to follow up. Writer misread and thought patient was still having symptoms. Patient states he is feeling okay today as well as Sunday. Patient has been drinking water now instead of electrolyte drinks. Patient has been exercising today and feeling okay. Advised patient to follow up in clinic if symptoms return or if he is exposed to heat for a period of time. Patient agrees to plan.    Thank you,  Flakito, Triage TANA Arellano    3:10 PM 7/28/2025

## (undated) DEVICE — SU ETHILON 3-0 PS-2 18" 1669H

## (undated) DEVICE — CAST PADDING 6" STERILE 9046S

## (undated) DEVICE — GLOVE PROTEXIS BLUE W/NEU-THERA 8.0  2D73EB80

## (undated) DEVICE — LINEN HALF SHEET 5512

## (undated) DEVICE — SOL NACL 0.9% IRRIG 3000ML BAG 2B7477

## (undated) DEVICE — BAG CLEAR TRASH 1.3M 39X33" P4040C

## (undated) DEVICE — GLOVE PROTEXIS POWDER FREE 8.0 ORTHOPEDIC 2D73ET80

## (undated) DEVICE — NDL BLUNT 18GA 1" W/O FILTER 305181

## (undated) DEVICE — PACK ARTHROSCOPY KNEE

## (undated) DEVICE — DRSG ABDOMINAL 07 1/2X8" 7197D

## (undated) DEVICE — TUBING ARTHRO CONMED/LINVATEC PUMP BLUE INFLOW 10K100

## (undated) DEVICE — KIT ENDO TURNOVER/PROCEDURE W/CLEAN A SCOPE LINERS 103888

## (undated) DEVICE — LINEN FULL SHEET 5511

## (undated) DEVICE — DECANTER VIAL 2006S

## (undated) DEVICE — SYR 03ML LL W/O NDL 309657

## (undated) DEVICE — GLOVE PROTEXIS POWDER FREE 7.5 ORTHOPEDIC 2D73ET75

## (undated) DEVICE — LINEN TOWEL PACK X5 5464

## (undated) DEVICE — SUCTION MANIFOLD NEPTUNE 2 SYS 4 PORT 0702-020-000

## (undated) RX ORDER — FENTANYL CITRATE 50 UG/ML
INJECTION, SOLUTION INTRAMUSCULAR; INTRAVENOUS
Status: DISPENSED
Start: 2017-08-28

## (undated) RX ORDER — DEXAMETHASONE SODIUM PHOSPHATE 4 MG/ML
INJECTION, SOLUTION INTRA-ARTICULAR; INTRALESIONAL; INTRAMUSCULAR; INTRAVENOUS; SOFT TISSUE
Status: DISPENSED
Start: 2017-08-28

## (undated) RX ORDER — LIDOCAINE HYDROCHLORIDE 10 MG/ML
INJECTION, SOLUTION EPIDURAL; INFILTRATION; INTRACAUDAL; PERINEURAL
Status: DISPENSED
Start: 2017-08-28

## (undated) RX ORDER — PROPOFOL 10 MG/ML
INJECTION, EMULSION INTRAVENOUS
Status: DISPENSED
Start: 2017-08-28

## (undated) RX ORDER — EPHEDRINE SULFATE 50 MG/ML
INJECTION, SOLUTION INTRAMUSCULAR; INTRAVENOUS; SUBCUTANEOUS
Status: DISPENSED
Start: 2017-08-28

## (undated) RX ORDER — CLINDAMYCIN PHOSPHATE 900 MG/50ML
INJECTION, SOLUTION INTRAVENOUS
Status: DISPENSED
Start: 2017-08-28

## (undated) RX ORDER — ROPIVACAINE HYDROCHLORIDE 7.5 MG/ML
INJECTION, SOLUTION EPIDURAL; PERINEURAL
Status: DISPENSED
Start: 2017-08-28

## (undated) RX ORDER — KETOROLAC TROMETHAMINE 30 MG/ML
INJECTION, SOLUTION INTRAMUSCULAR; INTRAVENOUS
Status: DISPENSED
Start: 2017-08-28

## (undated) RX ORDER — GLYCOPYRROLATE 0.2 MG/ML
INJECTION INTRAMUSCULAR; INTRAVENOUS
Status: DISPENSED
Start: 2017-08-28

## (undated) RX ORDER — ONDANSETRON 2 MG/ML
INJECTION INTRAMUSCULAR; INTRAVENOUS
Status: DISPENSED
Start: 2017-08-28

## (undated) RX ORDER — METHYLPREDNISOLONE ACETATE 40 MG/ML
INJECTION, SUSPENSION INTRA-ARTICULAR; INTRALESIONAL; INTRAMUSCULAR; SOFT TISSUE
Status: DISPENSED
Start: 2017-08-28

## (undated) RX ORDER — FENTANYL CITRATE 50 UG/ML
INJECTION, SOLUTION INTRAMUSCULAR; INTRAVENOUS
Status: DISPENSED
Start: 2019-01-24